# Patient Record
Sex: MALE | Race: WHITE | Employment: OTHER | URBAN - METROPOLITAN AREA
[De-identification: names, ages, dates, MRNs, and addresses within clinical notes are randomized per-mention and may not be internally consistent; named-entity substitution may affect disease eponyms.]

---

## 2017-02-01 ENCOUNTER — MED REC SCAN ONLY (OUTPATIENT)
Dept: FAMILY MEDICINE CLINIC | Facility: CLINIC | Age: 67
End: 2017-02-01

## 2017-02-07 ENCOUNTER — TELEPHONE (OUTPATIENT)
Dept: FAMILY MEDICINE CLINIC | Facility: CLINIC | Age: 67
End: 2017-02-07

## 2017-02-07 DIAGNOSIS — I25.10 CORONARY ARTERY DISEASE INVOLVING NATIVE CORONARY ARTERY OF NATIVE HEART WITHOUT ANGINA PECTORIS: Primary | ICD-10-CM

## 2017-02-07 NOTE — TELEPHONE ENCOUNTER
ekg was ordered for his preop and he's due for labs    Tell him to get them done now in time for his surgery and then we can send preop paperwork- no need for ov for him we did it already

## 2017-02-07 NOTE — TELEPHONE ENCOUNTER
Patient notified he needs to do labs and ekg in outpatient or at the hospital.  Dr. Kendra Todd does not need to see him. Patient will get testing done and then call us so we are aware, Dr. Kendra Todd will review and do clearance. I cancelled his upcoming appointment.

## 2017-02-09 ENCOUNTER — PRIOR ORIGINAL RECORDS (OUTPATIENT)
Dept: OTHER | Age: 67
End: 2017-02-09

## 2017-02-09 ENCOUNTER — APPOINTMENT (OUTPATIENT)
Dept: LAB | Age: 67
End: 2017-02-09
Attending: INTERNAL MEDICINE
Payer: MEDICARE

## 2017-02-09 ENCOUNTER — LAB ENCOUNTER (OUTPATIENT)
Dept: LAB | Age: 67
End: 2017-02-09
Attending: INTERNAL MEDICINE
Payer: MEDICARE

## 2017-02-09 DIAGNOSIS — E11.8 CONTROLLED TYPE 2 DIABETES MELLITUS WITH COMPLICATION, WITHOUT LONG-TERM CURRENT USE OF INSULIN (HCC): ICD-10-CM

## 2017-02-09 DIAGNOSIS — I25.10 CORONARY ARTERY DISEASE INVOLVING NATIVE CORONARY ARTERY OF NATIVE HEART WITHOUT ANGINA PECTORIS: ICD-10-CM

## 2017-02-09 PROBLEM — D75.1 ERYTHROCYTOSIS: Status: ACTIVE | Noted: 2017-02-09

## 2017-02-09 LAB
ALBUMIN SERPL-MCNC: 3.9 G/DL (ref 3.5–4.8)
ALP LIVER SERPL-CCNC: 57 U/L (ref 45–117)
ALT SERPL-CCNC: 65 U/L (ref 17–63)
AST SERPL-CCNC: 34 U/L (ref 15–41)
ATRIAL RATE: 72 BPM
BASOPHILS # BLD AUTO: 0.06 X10(3) UL (ref 0–0.1)
BASOPHILS NFR BLD AUTO: 0.7 %
BILIRUB SERPL-MCNC: 0.5 MG/DL (ref 0.1–2)
BUN BLD-MCNC: 15 MG/DL (ref 8–20)
CALCIUM BLD-MCNC: 9.4 MG/DL (ref 8.3–10.3)
CHLORIDE: 106 MMOL/L (ref 101–111)
CHOLEST SMN-MCNC: 130 MG/DL (ref ?–200)
CO2: 27 MMOL/L (ref 22–32)
CREAT BLD-MCNC: 1.15 MG/DL (ref 0.7–1.3)
CREAT UR-SCNC: 181 MG/DL
EOSINOPHIL # BLD AUTO: 0.17 X10(3) UL (ref 0–0.3)
EOSINOPHIL NFR BLD AUTO: 1.9 %
ERYTHROCYTE [DISTWIDTH] IN BLOOD BY AUTOMATED COUNT: 12.3 % (ref 11.5–16)
GLUCOSE BLD-MCNC: 115 MG/DL (ref 70–99)
HCT VFR BLD AUTO: 52.8 % (ref 37–53)
HDLC SERPL-MCNC: 44 MG/DL (ref 45–?)
HDLC SERPL: 2.95 {RATIO} (ref ?–4.97)
HGB BLD-MCNC: 18 G/DL (ref 13–17)
IMMATURE GRANULOCYTE COUNT: 0.03 X10(3) UL (ref 0–1)
IMMATURE GRANULOCYTE RATIO %: 0.3 %
LDLC SERPL CALC-MCNC: 60 MG/DL (ref ?–130)
LYMPHOCYTES # BLD AUTO: 2.92 X10(3) UL (ref 0.9–4)
LYMPHOCYTES NFR BLD AUTO: 32.3 %
M PROTEIN MFR SERPL ELPH: 7.4 G/DL (ref 6.1–8.3)
MCH RBC QN AUTO: 31.3 PG (ref 27–33.2)
MCHC RBC AUTO-ENTMCNC: 34.1 G/DL (ref 31–37)
MCV RBC AUTO: 91.7 FL (ref 80–99)
MICROALBUMIN UR-MCNC: 0.79 MG/DL
MICROALBUMIN/CREAT 24H UR-RTO: 4.4 UG/MG (ref ?–30)
MONOCYTES # BLD AUTO: 0.9 X10(3) UL (ref 0.1–0.6)
MONOCYTES NFR BLD AUTO: 10 %
NEUTROPHIL ABS PRELIM: 4.95 X10 (3) UL (ref 1.3–6.7)
NEUTROPHILS # BLD AUTO: 4.95 X10(3) UL (ref 1.3–6.7)
NEUTROPHILS NFR BLD AUTO: 54.8 %
NONHDLC SERPL-MCNC: 86 MG/DL (ref ?–130)
P AXIS: 51 DEGREES
P-R INTERVAL: 184 MS
PLATELET # BLD AUTO: 264 10(3)UL (ref 150–450)
POTASSIUM SERPL-SCNC: 4.5 MMOL/L (ref 3.6–5.1)
Q-T INTERVAL: 390 MS
QRS DURATION: 114 MS
QTC CALCULATION (BEZET): 427 MS
R AXIS: 66 DEGREES
RBC # BLD AUTO: 5.76 X10(6)UL (ref 3.8–5.8)
RED CELL DISTRIBUTION WIDTH-SD: 40.9 FL (ref 35.1–46.3)
SODIUM SERPL-SCNC: 140 MMOL/L (ref 136–144)
T AXIS: 18 DEGREES
TRIGLYCERIDES: 131 MG/DL (ref ?–150)
VENTRICULAR RATE: 72 BPM
VLDL: 26 MG/DL (ref 5–40)
WBC # BLD AUTO: 9 X10(3) UL (ref 4–13)

## 2017-02-09 PROCEDURE — 82570 ASSAY OF URINE CREATININE: CPT

## 2017-02-09 PROCEDURE — 82043 UR ALBUMIN QUANTITATIVE: CPT

## 2017-02-09 PROCEDURE — 93010 ELECTROCARDIOGRAM REPORT: CPT | Performed by: INTERNAL MEDICINE

## 2017-02-09 PROCEDURE — 36415 COLL VENOUS BLD VENIPUNCTURE: CPT

## 2017-02-09 PROCEDURE — 80061 LIPID PANEL: CPT

## 2017-02-09 PROCEDURE — 93005 ELECTROCARDIOGRAM TRACING: CPT

## 2017-02-09 PROCEDURE — 80053 COMPREHEN METABOLIC PANEL: CPT

## 2017-02-09 PROCEDURE — 85025 COMPLETE CBC W/AUTO DIFF WBC: CPT

## 2017-02-09 NOTE — PROGRESS NOTES
Quick Note:    Labs are mostly normal but elevated liver enzymes, and high rbc count.  This is fine for his cataract surgery but let him know to make a fu appt and place a referral to dr mcintosh hematologist for eval thanks  ______

## 2017-02-10 ENCOUNTER — TELEPHONE (OUTPATIENT)
Dept: FAMILY MEDICINE CLINIC | Facility: CLINIC | Age: 67
End: 2017-02-10

## 2017-02-13 ENCOUNTER — TELEPHONE (OUTPATIENT)
Dept: FAMILY MEDICINE CLINIC | Facility: CLINIC | Age: 67
End: 2017-02-13

## 2017-02-13 NOTE — TELEPHONE ENCOUNTER
Narrative      Normal sinus rhythm  Inferior infarct (cited on or before 29-MAY-2003)  Abnormal ECG  When compared with ECG of 24-JAN-2015 10:18,  No significant change  Confirmed by Nicko Falk M.D., [address][id number]) on 2/9/2017 5:59:47 PM     Patient would like t

## 2017-02-13 NOTE — TELEPHONE ENCOUNTER
Patient informed he does not need appointment tomorrow. Appt is already cancelled. Ekg was the same as 2015 and Dr. Micki Weston his paperwork to Dr. Yehuda North.

## 2017-02-13 NOTE — TELEPHONE ENCOUNTER
1. take him off schedule- he doesn' tneed appt    2. ekg was same as 1/2015- tell him i already sent paperwork to dr oppenheim he's fine for surgery    Please take him off my schedule

## 2017-02-14 ENCOUNTER — PATIENT MESSAGE (OUTPATIENT)
Dept: FAMILY MEDICINE CLINIC | Facility: CLINIC | Age: 67
End: 2017-02-14

## 2017-02-14 DIAGNOSIS — R71.8 ABNORMAL RBC: Primary | ICD-10-CM

## 2017-02-14 NOTE — TELEPHONE ENCOUNTER
From: Jhon Herrera  To: Julie Fothergill, MD  Sent: 2/14/2017 1:57 PM CST  Subject: Test Results Question    I have a question about MICROALB/CREAT RATIO, RANDOM URINE resulted on 2/9/17, 11:41 AM.    I also did ekg and blood work, where do I find those resul

## 2017-02-14 NOTE — TELEPHONE ENCOUNTER
Notes Recorded by Mary Berumen RN on 2/14/2017 at 1:29 PM  See results below. Spoke with patient regarding lab results. Patient aware, all questions answered.    Referral placed   Bryan Duncan MD  Hematology & [address]  Hematology On[place]

## 2017-02-22 ENCOUNTER — TELEPHONE (OUTPATIENT)
Dept: FAMILY MEDICINE CLINIC | Facility: CLINIC | Age: 67
End: 2017-02-22

## 2017-02-22 NOTE — TELEPHONE ENCOUNTER
Notified patient MD is hem/onc but he is seeing MD for hematology portion, patient verbalized understanding.

## 2017-03-08 ENCOUNTER — OFFICE VISIT (OUTPATIENT)
Dept: HEMATOLOGY/ONCOLOGY | Age: 67
End: 2017-03-08
Attending: INTERNAL MEDICINE
Payer: MEDICARE

## 2017-03-08 ENCOUNTER — PRIOR ORIGINAL RECORDS (OUTPATIENT)
Dept: OTHER | Age: 67
End: 2017-03-08

## 2017-03-08 VITALS
RESPIRATION RATE: 18 BRPM | SYSTOLIC BLOOD PRESSURE: 144 MMHG | TEMPERATURE: 98 F | HEART RATE: 73 BPM | DIASTOLIC BLOOD PRESSURE: 76 MMHG | WEIGHT: 280 LBS | BODY MASS INDEX: 37 KG/M2

## 2017-03-08 DIAGNOSIS — E11.9 TYPE 2 DIABETES MELLITUS WITHOUT COMPLICATION, WITHOUT LONG-TERM CURRENT USE OF INSULIN (HCC): ICD-10-CM

## 2017-03-08 DIAGNOSIS — R71.8 ABNORMAL RBC: Primary | ICD-10-CM

## 2017-03-08 DIAGNOSIS — D75.1 POLYCYTHEMIA: ICD-10-CM

## 2017-03-08 DIAGNOSIS — N52.9 ERECTILE DYSFUNCTION, UNSPECIFIED ERECTILE DYSFUNCTION TYPE: ICD-10-CM

## 2017-03-08 LAB
BASOPHILS # BLD AUTO: 0.05 X10(3) UL (ref 0–0.1)
BASOPHILS NFR BLD AUTO: 0.6 %
DEPRECATED HBV CORE AB SER IA-ACNC: 212.4 NG/ML (ref 22–322)
EOSINOPHIL # BLD AUTO: 0.19 X10(3) UL (ref 0–0.3)
EOSINOPHIL NFR BLD AUTO: 2.2 %
ERYTHROCYTE [DISTWIDTH] IN BLOOD BY AUTOMATED COUNT: 12.7 % (ref 11.5–16)
HCT VFR BLD AUTO: 50.9 % (ref 37–53)
HGB BLD-MCNC: 17.3 G/DL (ref 13–17)
IMMATURE GRANULOCYTE COUNT: 0.01 X10(3) UL (ref 0–1)
IMMATURE GRANULOCYTE RATIO %: 0.1 %
IRON SATURATION: 31 % (ref 13–45)
IRON: 107 UG/DL (ref 45–182)
LYMPHOCYTES # BLD AUTO: 2.89 X10(3) UL (ref 0.9–4)
LYMPHOCYTES NFR BLD AUTO: 33.8 %
MCH RBC QN AUTO: 31.2 PG (ref 27–33.2)
MCHC RBC AUTO-ENTMCNC: 34 G/DL (ref 31–37)
MCV RBC AUTO: 91.9 FL (ref 80–99)
MONOCYTES # BLD AUTO: 0.94 X10(3) UL (ref 0.1–0.6)
MONOCYTES NFR BLD AUTO: 11 %
NEUTROPHIL ABS PRELIM: 4.46 X10 (3) UL (ref 1.3–6.7)
NEUTROPHILS # BLD AUTO: 4.46 X10(3) UL (ref 1.3–6.7)
NEUTROPHILS NFR BLD AUTO: 52.3 %
PLATELET # BLD AUTO: 278 10(3)UL (ref 150–450)
RBC # BLD AUTO: 5.54 X10(6)UL (ref 3.8–5.8)
RED CELL DISTRIBUTION WIDTH-SD: 43 FL (ref 35.1–46.3)
TOTAL IRON BINDING CAPACITY: 349 UG/DL (ref 298–536)
TRANSFERRIN: 234 MG/DL (ref 200–360)
WBC # BLD AUTO: 8.5 X10(3) UL (ref 4–13)

## 2017-03-08 PROCEDURE — 99205 OFFICE O/P NEW HI 60 MIN: CPT | Performed by: INTERNAL MEDICINE

## 2017-03-08 NOTE — CONSULTS
Cancer Center Report of Consultation    Patient Name: Kath Grace   YOB: 1950   Medical Record Number: [id number]   CSN: [id number]   Consulting Physician: Olivia Calle M.D.    Referring Physician: Beverley Crawford    Date of Consultation: Surgical History    CABG  2003    COLONOSCOPY  2003    REMOVAL GALLBLADDER      CYSTOSCOPY,INSERT URETERAL STENT  12.2014    CHOLECYSTECTOMY      COLONOSCOPY N/A 1/30/2015    Comment Procedure: COLONOSCOPY;  Surgeon: Brandon Luther MD;  Location: [address] [place] [address] Suppl (ACCU-CHEK JAQUELINE CONNECT) W/DEVICE Does not apply Kit, 1 kit by Does not apply route daily. , Disp: 1 kit, Rfl: 0  •  Glucose Blood (ACCU-CHEK JAQUELINE PLUS) In Vitro Strip, Use test strip daily, Disp: 100 strip, Rfl: 0  •  ACCU-CHEK FASTCLIX LANCETS Meredith 0    Physical Examination:    Constitutional Normal - Alert, cooperative, oriented. Mood and affect appropriate. Appears close to chronological age. Well nourished. Well developed. Eyes Normal - Conjunctivae and sclerae are clear and without icterus.  Pup PHOSPHATASE Latest Ref Range:  U/L 57   AST (SGOT) Latest Ref Range: 15-41 U/L 34   ALT (SGPT) Latest Ref Range: 17-63 U/L 65 (H)   Total Bilirubin Latest Ref Range: 0.1-2.0 mg/dL 0.5   TOTAL PROTEIN Latest Ref Range: 6.1-8.3 g/dL 7.4   Albumin Lates toward the upper limit of normal.  The differential is fairly broad and includes iron overload, as well as primary and secondary polycythemia. His ED, elevated LFT, and diabetes may indicate hemochromatosis.   His snoring, nighttime apneic episodes, and day

## 2017-03-10 LAB — ERYTHROPOIETIN (EPO): 8 MU/ML

## 2017-03-22 ENCOUNTER — OFFICE VISIT (OUTPATIENT)
Dept: HEMATOLOGY/ONCOLOGY | Age: 67
End: 2017-03-22
Attending: INTERNAL MEDICINE
Payer: MEDICARE

## 2017-03-22 VITALS
HEART RATE: 74 BPM | OXYGEN SATURATION: 97 % | SYSTOLIC BLOOD PRESSURE: 159 MMHG | DIASTOLIC BLOOD PRESSURE: 91 MMHG | TEMPERATURE: 97 F | BODY MASS INDEX: 37 KG/M2 | WEIGHT: 280.19 LBS | RESPIRATION RATE: 18 BRPM

## 2017-03-22 DIAGNOSIS — D75.1 POLYCYTHEMIA: Primary | ICD-10-CM

## 2017-03-22 PROCEDURE — 99214 OFFICE O/P EST MOD 30 MIN: CPT | Performed by: INTERNAL MEDICINE

## 2017-03-28 LAB — JAK2 GENE, V617F MUTATION, QUALITATIVE: NOT DETECTED

## 2017-03-30 NOTE — PROGRESS NOTES
Cancer Center Progress Note  Patient Name: Rehan Coy   YOB: 1950   Medical Record Number: [id number]      Attending Physician: Jennifer Guzman M.D. Date of Visit: 3/22/2017     Chief Complaint:  No chief complaint on file. Cervical disc herniation 5/23/2016   • Erythrocytosis 2/9/2017       Past Surgical History:      Past Surgical History    CABG  2003    COLONOSCOPY  2003    REMOVAL GALLBLADDER      CYSTOSCOPY,INSERT URETERAL STENT  12.2014    CHOLECYSTECTOMY      COLONOSC Erectile Dysfunction. , Disp: 4 tablet, Rfl: 0  •  Blood Glucose Monitoring Suppl (ACCU-CHEK JAQUELINE CONNECT) W/DEVICE Does not apply Kit, 1 kit by Does not apply route daily. , Disp: 1 kit, Rfl: 0  •  Glucose Blood (ACCU-CHEK JAQUELINE PLUS) In Vitro Strip, Use t close to chronological age. Well nourished. Well developed. Eyes Normal - Conjunctivae and sclerae are clear and without icterus. Pupils are reactive and equal.   Hematologic/Lymphatic Normal - No petechiae or purpura.   No tender or palpable lymph nodes 1. 30-6.70 x10(3) uL 4.46   Lymphocytes Absolute Latest Ref Range: 0.90-4.00 x10(3) uL 2.89   Monocytes Absolute Latest Ref Range: 0.10-0.60 x10(3) uL 0.94 (H)   Eosinophils Absolute Latest Ref Range: 0.00-0.30 x10(3) uL 0.19   Basophils Absolute Latest Ref

## 2017-04-05 ENCOUNTER — OFFICE VISIT (OUTPATIENT)
Dept: FAMILY MEDICINE CLINIC | Facility: CLINIC | Age: 67
End: 2017-04-05

## 2017-04-05 VITALS
DIASTOLIC BLOOD PRESSURE: 78 MMHG | HEART RATE: 76 BPM | SYSTOLIC BLOOD PRESSURE: 128 MMHG | HEIGHT: 74 IN | RESPIRATION RATE: 16 BRPM | TEMPERATURE: 98 F | BODY MASS INDEX: 36.32 KG/M2 | WEIGHT: 283 LBS

## 2017-04-05 DIAGNOSIS — H90.3 SENSORINEURAL HEARING LOSS (SNHL) OF BOTH EARS: ICD-10-CM

## 2017-04-05 DIAGNOSIS — E66.01 SEVERE OBESITY (BMI 35.0-39.9) WITH COMORBIDITY (HCC): Chronic | ICD-10-CM

## 2017-04-05 DIAGNOSIS — K76.0 FATTY LIVER: ICD-10-CM

## 2017-04-05 DIAGNOSIS — E11.51 PERIPHERAL VASCULAR DISEASE IN DIABETES MELLITUS (HCC): ICD-10-CM

## 2017-04-05 DIAGNOSIS — R79.89 ABNORMAL LFTS: ICD-10-CM

## 2017-04-05 DIAGNOSIS — M47.812 CERVICAL SPINE ARTHRITIS: ICD-10-CM

## 2017-04-05 DIAGNOSIS — I71.4 ABDOMINAL AORTIC ANEURYSM (AAA) WITHOUT RUPTURE (HCC): Primary | ICD-10-CM

## 2017-04-05 DIAGNOSIS — I77.9 BILATERAL CAROTID ARTERY DISEASE (HCC): ICD-10-CM

## 2017-04-05 DIAGNOSIS — D75.1 ERYTHROCYTOSIS: ICD-10-CM

## 2017-04-05 DIAGNOSIS — I25.810 ATHEROSCLEROSIS OF CORONARY ARTERY BYPASS GRAFT OF NATIVE HEART WITHOUT ANGINA PECTORIS: ICD-10-CM

## 2017-04-05 DIAGNOSIS — R15.1 FECAL SMEARING: ICD-10-CM

## 2017-04-05 DIAGNOSIS — Z95.1 S/P CABG (CORONARY ARTERY BYPASS GRAFT): ICD-10-CM

## 2017-04-05 DIAGNOSIS — N52.9 VASCULOGENIC ERECTILE DYSFUNCTION, UNSPECIFIED VASCULOGENIC ERECTILE DYSFUNCTION TYPE: ICD-10-CM

## 2017-04-05 DIAGNOSIS — M70.61 TROCHANTERIC BURSITIS OF RIGHT HIP: ICD-10-CM

## 2017-04-05 DIAGNOSIS — Z00.00 ROUTINE GENERAL MEDICAL EXAMINATION AT A HEALTH CARE FACILITY: ICD-10-CM

## 2017-04-05 DIAGNOSIS — D75.1 POLYCYTHEMIA: ICD-10-CM

## 2017-04-05 DIAGNOSIS — M50.20 CERVICAL DISC HERNIATION: ICD-10-CM

## 2017-04-05 DIAGNOSIS — Z87.442 HISTORY OF KIDNEY STONES: ICD-10-CM

## 2017-04-05 DIAGNOSIS — N40.0 BENIGN NON-NODULAR PROSTATIC HYPERPLASIA WITHOUT LOWER URINARY TRACT SYMPTOMS: ICD-10-CM

## 2017-04-05 DIAGNOSIS — K57.30 DIVERTICULOSIS OF LARGE INTESTINE WITHOUT HEMORRHAGE: ICD-10-CM

## 2017-04-05 DIAGNOSIS — I70.203 BILATERAL ATHEROSCLEROSIS OF LEGS (HCC): ICD-10-CM

## 2017-04-05 DIAGNOSIS — I49.3 PVC (PREMATURE VENTRICULAR CONTRACTION): ICD-10-CM

## 2017-04-05 DIAGNOSIS — M62.08 DIASTASIS RECTI: ICD-10-CM

## 2017-04-05 DIAGNOSIS — M47.22 OSTEOARTHRITIS OF SPINE WITH RADICULOPATHY, CERVICAL REGION: ICD-10-CM

## 2017-04-05 DIAGNOSIS — M48.02 FORAMINAL STENOSIS OF CERVICAL REGION: ICD-10-CM

## 2017-04-05 DIAGNOSIS — E78.5 DYSLIPIDEMIA: ICD-10-CM

## 2017-04-05 PROCEDURE — 96160 PT-FOCUSED HLTH RISK ASSMT: CPT | Performed by: INTERNAL MEDICINE

## 2017-04-05 RX ORDER — SILDENAFIL 100 MG/1
100 TABLET, FILM COATED ORAL
Qty: 4 TABLET | Refills: 0 | COMMUNITY
Start: 2017-04-05 | End: 2018-02-15

## 2017-04-05 NOTE — PATIENT INSTRUCTIONS
Thank you for choosing Justin Leo MD at [place] [address]  To Do: Carly Ennis  1. Ask cardiologist about checking ultrasound on legs and carotids to see if there's more plaque  2.  Checkup dr rutherford in 6-12 months  Labs next year    • Please signup for strive to make you healthier and to improve your quality of life.     Referrals, and Radiology Information:    If your insurance requires a referral to a specialist, please allow 5 business days to process your referral request.    If Sang Vidales MD orders

## 2017-04-05 NOTE — PROGRESS NOTES
Julio Cesar Everett is a 77year old male who presents for a MA Supervisit.     Health Maintenance Topics with due status: Due Soon       Topic Date Due    Annual Depression Screen 05/23/2017     Health Maintenance Topics with due status: Overdue       Topic Date poor?: No    Type of Diet: Balanced    How does the patient maintain a good energy level?: Other (Does not exercise)    How would you describe your daily physical activity?: Light    How would you describe your current health state?: Good    How do you Heath Hem days    PHQ-2 SCORE: 2           Advance Directives     Do you have a healthcare power of ?: No    Do you have a living will?: No     Patient is already taking aspirin per med list or else it's contraindicated based on patient's intolerance to aspi PSA  Every 2 years PSA due on 05/06/2018  Update Health Maintenance if applicable   Immunizations      Influenza No orders found for this or any previous visit.  Update Immunization Activity if applicable    Pneumococcal   Orders placed or performed in daily as needed for Erectile Dysfunction. Disp: 4 tablet Rfl: 0   cholestyramine light 4 G Oral Powd Pack Take 1 packet (4 g total) by mouth 2 (two) times daily.  For stool leakage Disp: 50 each Rfl: 12   GlipiZIDE ER 5 MG Oral Tablet 24 Hr Take 1 tablet (5 bifocals   • Osteoarthrosis, unspecified whether generalized or localized, unspecified site    • Hyperlipidemia    • Essential hypertension    • Obesity (BMI 30-39. 9) 5/23/2016   • Cervical disc herniation 5/23/2016   • Erythrocytosis 2/9/2017   • Cataract adenopathy. Does not bruise/bleed easily. Psychiatric/Behavioral: Negative for confusion and agitation. The patient is not nervous/anxious. EXAM:   /78 mmHg  Pulse 76  Temp(Src) 97.9 °F (36.6 °C) (Temporal)  Resp 16  Ht 74\"  Wt 283 lb  BMI 36. 3 Patient verbalizes understanding. Patient is notified to call with any questions, complications, allergies, or worsening or changing symptoms. Patient is to call with any side effects or complications from the treatments as a result of today.     Diagnoses repeat imaging of neck and his legs    Abnormal LFTs chronic stable issue, continue present management with observation and medications as noted      Dyslipidemia chronic stable issue, continue present management with observation and medications as noted both ears chronic stable issue, continue present management with observation and medications as noted      Diverticulosis of large intestine without hemorrhage  chronic stable issue, continue present management with observation and medications as noted atherosclerosis of legs ([address]     Carotid artery disease ([address]     Diverticulosis     Fatty liver     AAA (abdominal aortic aneurysm) ([address]     History of kidney stones     Diastasis recti     Osteoarthritis of spine with radiculopathy, cervical region

## 2017-04-27 ENCOUNTER — TELEPHONE (OUTPATIENT)
Dept: FAMILY MEDICINE CLINIC | Facility: CLINIC | Age: 67
End: 2017-04-27

## 2017-05-04 ENCOUNTER — OFFICE VISIT (OUTPATIENT)
Dept: SLEEP CENTER | Facility: HOSPITAL | Age: 67
End: 2017-05-04
Attending: INTERNAL MEDICINE
Payer: MEDICARE

## 2017-05-04 PROCEDURE — 95811 POLYSOM 6/>YRS CPAP 4/> PARM: CPT

## 2017-05-10 ENCOUNTER — PRIOR ORIGINAL RECORDS (OUTPATIENT)
Dept: OTHER | Age: 67
End: 2017-05-10

## 2017-05-10 LAB
ALBUMIN: 3.9 G/DL
ALKALINE PHOSPHATATE(ALK PHOS): 57 IU/L
ALT (SGPT): 65 U/L
AST (SGOT): 34 U/L
BILIRUBIN TOTAL: 0.5 MG/DL
BUN: 15 MG/DL
CALCIUM: 9.4 MG/DL
CHLORIDE: 106 MEQ/L
CHOLESTEROL, TOTAL: 130 MG/DL
CREATININE, SERUM: 1.15 MG/DL
GLUCOSE: 115 MG/DL
GLUCOSE: 115 MG/DL
HDL CHOLESTEROL: 44 MG/DL
HEMATOCRIT: 50.9 %
HEMATOCRIT: 52.8 %
HEMOGLOBIN: 17.3 G/DL
HEMOGLOBIN: 18 G/DL
LDL CHOLESTEROL: 60 MG/DL
MCH: 31.2 PG
MCH: 31.3 PG
MCHC: 34 G/DL
MCHC: 34.1 G/DL
MCV: 91.7 FL
MCV: 91.9 FL
NON-HDL CHOLESTEROL: 86 MG/DL
PLATELETS: 264 K/UL
PLATELETS: 278 K/UL
POTASSIUM, SERUM: 4.5 MEQ/L
PROTEIN, TOTAL: 7.4 G/DL
RED BLOOD COUNT: 5.54 X 10-6/U
RED BLOOD COUNT: 5.76 X 10-6/U
SGOT (AST): 34 IU/L
SGPT (ALT): 65 IU/L
SODIUM: 140 MEQ/L
TRIGLYCERIDES: 131 MG/DL
WHITE BLOOD COUNT: 8.5 X 10-3/U
WHITE BLOOD COUNT: 9 X 10-3/U

## 2017-05-10 NOTE — PROCEDURES
[address]       Accredited by the [facility] ([facility])    PATIENT'S NAME:        Ilda Santoyo  ATTENDING PHYSICIAN:   Otto Garcia M.D. REFERRING PHYSICIAN:   Otto Garcia M.D.   PATIENT patient had an overall sleep efficiency of 73%. During baseline portion of study, sleep stage breakdown as follows:  Stage 1, 1%; stage 2, 72%; stage 3, 20%; stage REM 8%.     During CPAP titration, the total recording time was 242 minutes, total sleep zak of alveolar hypoventilation, or could be due to underlying cardiopulmonary disease. 2.   Inadequate CPAP titration with persistent obstructive hypopneic episodes as well as treatment emergent central apneas. RECOMMENDATIONS:    1.    The patient should

## 2017-05-12 ENCOUNTER — HOSPITAL ENCOUNTER (OUTPATIENT)
Dept: ULTRASOUND IMAGING | Facility: HOSPITAL | Age: 67
Discharge: HOME | End: 2017-05-12
Attending: INTERNAL MEDICINE
Payer: MEDICARE

## 2017-05-12 DIAGNOSIS — I71.4 ABDOMINAL AORTIC ANEURYSM WITHOUT RUPTURE (HCC): ICD-10-CM

## 2017-05-12 PROCEDURE — 76770 US EXAM ABDO BACK WALL COMP: CPT | Performed by: INTERNAL MEDICINE

## 2017-05-18 ENCOUNTER — OFFICE VISIT (OUTPATIENT)
Dept: HEMATOLOGY/ONCOLOGY | Age: 67
End: 2017-05-18
Attending: INTERNAL MEDICINE
Payer: MEDICARE

## 2017-05-18 VITALS
WEIGHT: 282.19 LBS | OXYGEN SATURATION: 92 % | SYSTOLIC BLOOD PRESSURE: 121 MMHG | RESPIRATION RATE: 20 BRPM | TEMPERATURE: 98 F | DIASTOLIC BLOOD PRESSURE: 55 MMHG | BODY MASS INDEX: 37 KG/M2 | HEART RATE: 71 BPM

## 2017-05-18 DIAGNOSIS — D75.1 ERYTHROCYTOSIS: Primary | ICD-10-CM

## 2017-05-18 PROCEDURE — 99213 OFFICE O/P EST LOW 20 MIN: CPT | Performed by: INTERNAL MEDICINE

## 2017-05-18 NOTE — PROGRESS NOTES
Cancer Center Progress Note  Patient Name: Greg Durán   YOB: 1950   Medical Record Number: [id number]      Attending Physician: Ayan Bowles M.D. Date of Visit: 5/18/2017    Chief Complaint:  No chief complaint on file. unspecified site    • Hyperlipidemia    • Essential hypertension    • Obesity (BMI 30-39. 9) 5/23/2016   • Cervical disc herniation 5/23/2016   • Erythrocytosis 2/9/2017   • Cataract, left      already had R eye update 2016   • Obstructive sleep apnea (adul each, Rfl: 12  •  GlipiZIDE ER 5 MG Oral Tablet 24 Hr, Take 1 tablet (5 mg total) by mouth daily.  Extended release for blood sugars, Disp: 90 tablet, Rfl: 3  •  Blood Glucose Monitoring Suppl (ACCU-CHEK JAQUELINE CONNECT) W/DEVICE Does not apply Kit, 1 kit by 92%    Physical Examination:    Constitutional Normal - Mood and affect appropriate. Appears close to chronological age. Well nourished. Well developed. Eyes Normal - Conjunctivae and sclerae are clear and without icterus.  Pupils are reactive and equal. Prelim Neutrophil Abs Latest Ref Range: 1.30-6.70 x10 (3) uL 4.46   4.51   Neutrophils Absolute Latest Ref Range: 1.30-6.70 x10(3) uL 4.46   4.51   Lymphocytes Absolute Latest Ref Range: 0.90-4.00 x10(3) uL 2.89   2.56   Monocytes Absolute Latest Ref Ran

## 2017-06-14 PROBLEM — G47.33 OSA (OBSTRUCTIVE SLEEP APNEA): Status: ACTIVE | Noted: 2017-06-14

## 2017-07-20 ENCOUNTER — TELEPHONE (OUTPATIENT)
Dept: HEMATOLOGY/ONCOLOGY | Facility: HOSPITAL | Age: 67
End: 2017-07-20

## 2017-07-20 ENCOUNTER — TELEPHONE (OUTPATIENT)
Dept: FAMILY MEDICINE CLINIC | Facility: CLINIC | Age: 67
End: 2017-07-20

## 2017-07-20 NOTE — TELEPHONE ENCOUNTER
Concerned as he noticed blood in his semen. Instructed to follow up with his PCP. Verbalized understanding and states he will call now.

## 2017-07-20 NOTE — TELEPHONE ENCOUNTER
Delano Bustillo- Patient stated that he  has blood in his semen for two weeks. He would like to speak with a nurse to discuss the symptoms that he is having. Please advise.

## 2017-07-20 NOTE — TELEPHONE ENCOUNTER
Patient reports blood in semen for 2 weeks with no improvement. Patient states it is not present in urine only in semen. Patient does not have urologist. Scheduled for the following appt:      Future Appointments  Date Time Provider Liane Lopez   7/24

## 2017-07-24 ENCOUNTER — OFFICE VISIT (OUTPATIENT)
Dept: FAMILY MEDICINE CLINIC | Facility: CLINIC | Age: 67
End: 2017-07-24

## 2017-07-24 VITALS
RESPIRATION RATE: 16 BRPM | TEMPERATURE: 97 F | HEART RATE: 72 BPM | BODY MASS INDEX: 36.83 KG/M2 | DIASTOLIC BLOOD PRESSURE: 70 MMHG | SYSTOLIC BLOOD PRESSURE: 122 MMHG | WEIGHT: 287 LBS | HEIGHT: 74 IN

## 2017-07-24 DIAGNOSIS — D75.1 POLYCYTHEMIA: ICD-10-CM

## 2017-07-24 DIAGNOSIS — R36.1 BLOOD IN SEMEN: Primary | ICD-10-CM

## 2017-07-24 DIAGNOSIS — G47.33 OSA (OBSTRUCTIVE SLEEP APNEA): ICD-10-CM

## 2017-07-24 DIAGNOSIS — E66.01 SEVERE OBESITY (BMI 35.0-39.9): ICD-10-CM

## 2017-07-24 PROCEDURE — 99213 OFFICE O/P EST LOW 20 MIN: CPT | Performed by: INTERNAL MEDICINE

## 2017-07-24 RX ORDER — CIPROFLOXACIN 250 MG/1
250 TABLET, FILM COATED ORAL 2 TIMES DAILY
Qty: 20 TABLET | Refills: 0 | Status: SHIPPED | OUTPATIENT
Start: 2017-07-24 | End: 2017-08-02 | Stop reason: ALTCHOICE

## 2017-07-24 RX ORDER — GLIPIZIDE 5 MG/1
5 TABLET, FILM COATED, EXTENDED RELEASE ORAL DAILY
Qty: 90 TABLET | Refills: 3 | Status: SHIPPED | OUTPATIENT
Start: 2017-07-24 | End: 2018-02-15

## 2017-07-24 RX ORDER — SILDENAFIL 100 MG/1
100 TABLET, FILM COATED ORAL AS NEEDED
Qty: 4 TABLET | Refills: 0 | COMMUNITY
Start: 2017-07-24 | End: 2017-08-02

## 2017-07-24 NOTE — PATIENT INSTRUCTIONS
Thank you for choosing Sharona Young MD at [facility]  To Do: Lauryn Ennis  1. Start cipro for blood in semen  2. See urologist   3. Weight loss  Consider topiramate and wellbutrin  4.  Return for weight loss followup if you want to explore more m No cakes, cookies, pasta, bread, rice, bagels, crackers, chips. Fill your plate with fruits, vegetables, nuts, whole grains, and meat of every kind except processed meat like salami, peralta, bologna. Low calorie is less than 1297-7896 calories a day.   Kalyn Segal good carbs  BAD carbs have added sugar such as  - soft drinks   - sport drinks   - fruit drinks   - beer   - french fries   - white rice   - sugar-sweetened cereals   - bagels   - baguettes   - croissants   - potato chips   - cookies   - white crackers   - Physical Therapy call [phone] usually in [facility] in Clinic in [place] at [facility].  [address] Mon-Fri at 8am-7:30pm, and Sat/Sun 9am-430pm  Also at [address]  Call [phone] for info    • Please call our office about any ques

## 2017-07-24 NOTE — PROGRESS NOTES
[facility] Internal Medicine Office Note  Chief Complaint:   Patient presents with:  Bleeding: noticed blood in semen for past few wks.   obesity    HPI:   This is a 79year old male coming in for  HPI  Blood in semen for 2 weeks  No pain  No trau hours as needed for Pain. Disp:  Rfl:    lisinopril (PRINIVIL,ZESTRIL) 5 MG Oral Tab Take 5 mg by mouth daily. Disp:  Rfl:    Aspirin 325 MG Oral Tab Take 325 mg by mouth daily.  Disp:  Rfl:    Metoprolol Succinate (TOPROL XL) 25 MG Oral Tablet SR 24 Hr Aiden such weight loss medications. That these are optional treatments to be used in conjunction with diet and exercise for promoting health and weight loss.   We discussed the option nature of these treatments, including all the risks noted on the drug label, a were placed in this encounter. Meds & Refills for this Visit:  Signed Prescriptions Disp Refills    Ciprofloxacin HCl 250 MG Oral Tab 20 tablet 0      Sig: Take 1 tablet (250 mg total) by mouth 2 (two) times daily.       GlipiZIDE ER 5 MG Oral Tablet 2 reference to record EAP   Depression Screening (PHQ-2/PHQ-9): Over the LAST 2 WEEKS   Little interest or pleasure in doing things (over the last two weeks)?: Not at all    Feeling down, depressed, or hopeless (over the last two weeks)?: Not at all    PHQ-2

## 2017-07-25 NOTE — ADDENDUM NOTE
Encounter addended by: Bre Childers MA on: 7/24/2017  7:09 PM<BR>    Actions taken:  activity accessed

## 2017-08-14 ENCOUNTER — NURSE ONLY (OUTPATIENT)
Dept: HEMATOLOGY/ONCOLOGY | Age: 67
End: 2017-08-14
Attending: INTERNAL MEDICINE
Payer: MEDICARE

## 2017-08-14 DIAGNOSIS — D75.1 POLYCYTHEMIA: ICD-10-CM

## 2017-08-14 DIAGNOSIS — D75.1 POLYCYTHEMIA: Primary | ICD-10-CM

## 2017-08-14 LAB
BASOPHILS # BLD AUTO: 0.04 X10(3) UL (ref 0–0.1)
BASOPHILS NFR BLD AUTO: 0.5 %
EOSINOPHIL # BLD AUTO: 0.21 X10(3) UL (ref 0–0.3)
EOSINOPHIL NFR BLD AUTO: 2.6 %
ERYTHROCYTE [DISTWIDTH] IN BLOOD BY AUTOMATED COUNT: 12.6 % (ref 11.5–16)
HCT VFR BLD AUTO: 47.2 % (ref 37–53)
HGB BLD-MCNC: 16.2 G/DL (ref 13–17)
IMMATURE GRANULOCYTE COUNT: 0.02 X10(3) UL (ref 0–1)
IMMATURE GRANULOCYTE RATIO %: 0.3 %
LYMPHOCYTES # BLD AUTO: 2.6 X10(3) UL (ref 0.9–4)
LYMPHOCYTES NFR BLD AUTO: 32.7 %
MCH RBC QN AUTO: 31 PG (ref 27–33.2)
MCHC RBC AUTO-ENTMCNC: 34.3 G/DL (ref 31–37)
MCV RBC AUTO: 90.4 FL (ref 80–99)
MONOCYTES # BLD AUTO: 0.96 X10(3) UL (ref 0.1–0.6)
MONOCYTES NFR BLD AUTO: 12.1 %
NEUTROPHIL ABS PRELIM: 4.13 X10 (3) UL (ref 1.3–6.7)
NEUTROPHILS # BLD AUTO: 4.13 X10(3) UL (ref 1.3–6.7)
NEUTROPHILS NFR BLD AUTO: 51.8 %
PLATELET # BLD AUTO: 231 10(3)UL (ref 150–450)
RBC # BLD AUTO: 5.22 X10(6)UL (ref 3.8–5.8)
RED CELL DISTRIBUTION WIDTH-SD: 41.9 FL (ref 35.1–46.3)
WBC # BLD AUTO: 8 X10(3) UL (ref 4–13)

## 2017-08-14 PROCEDURE — 85025 COMPLETE CBC W/AUTO DIFF WBC: CPT

## 2017-08-14 PROCEDURE — 36415 COLL VENOUS BLD VENIPUNCTURE: CPT

## 2017-08-17 ENCOUNTER — OFFICE VISIT (OUTPATIENT)
Dept: HEMATOLOGY/ONCOLOGY | Age: 67
End: 2017-08-17
Attending: INTERNAL MEDICINE
Payer: MEDICARE

## 2017-08-17 VITALS
WEIGHT: 288.88 LBS | BODY MASS INDEX: 37 KG/M2 | TEMPERATURE: 97 F | OXYGEN SATURATION: 92 % | DIASTOLIC BLOOD PRESSURE: 73 MMHG | HEART RATE: 94 BPM | SYSTOLIC BLOOD PRESSURE: 122 MMHG | RESPIRATION RATE: 20 BRPM

## 2017-08-17 DIAGNOSIS — D75.1 SECONDARY POLYCYTHEMIA: Primary | ICD-10-CM

## 2017-08-17 PROCEDURE — 99213 OFFICE O/P EST LOW 20 MIN: CPT | Performed by: INTERNAL MEDICINE

## 2017-08-17 NOTE — PROGRESS NOTES
Pt here for MD f/u visit. Pt voices no complaints @ present time. States he had labs on mon, 8/14 and here to discuss results.      Education Record    Learner:  Patient    Disease / Clearnce Iha    Barriers / Limitations:  None   Comments:    Method:  B

## 2017-08-22 ENCOUNTER — HOSPITAL ENCOUNTER (OUTPATIENT)
Dept: CT IMAGING | Facility: HOSPITAL | Age: 67
Discharge: HOME | End: 2017-08-22
Attending: NURSE PRACTITIONER
Payer: MEDICARE

## 2017-08-22 ENCOUNTER — HOSPITAL ENCOUNTER (OUTPATIENT)
Dept: GENERAL RADIOLOGY | Facility: HOSPITAL | Age: 67
Discharge: HOME | End: 2017-08-22
Attending: UROLOGY
Payer: MEDICARE

## 2017-08-22 DIAGNOSIS — Z87.891 PERSONAL HISTORY OF NICOTINE DEPENDENCE: ICD-10-CM

## 2017-08-22 DIAGNOSIS — N20.0 RENAL CALCULI: ICD-10-CM

## 2017-08-22 PROCEDURE — 71250 CT THORAX DX C-: CPT | Performed by: NURSE PRACTITIONER

## 2017-08-22 PROCEDURE — 74000 XR ABDOMEN (1 VIEW) (CPT=74000): CPT | Performed by: UROLOGY

## 2017-08-25 NOTE — PROGRESS NOTES
Reviewed LDCT. Minimal bronchial wall thickening and scattered atelectasis. Spoke with patient. He denies symptoms of bronchitis although has occasional cough. Normal PFT 2016. Recommend LDCT in 1 year given smoking history.   Advised patient if Dr. Nevaeh Martin

## 2017-10-10 ENCOUNTER — TELEPHONE (OUTPATIENT)
Dept: FAMILY MEDICINE CLINIC | Facility: CLINIC | Age: 67
End: 2017-10-10

## 2017-10-11 ENCOUNTER — TELEPHONE (OUTPATIENT)
Dept: SURGERY | Facility: CLINIC | Age: 67
End: 2017-10-11

## 2017-10-11 ENCOUNTER — OFFICE VISIT (OUTPATIENT)
Dept: FAMILY MEDICINE CLINIC | Facility: CLINIC | Age: 67
End: 2017-10-11

## 2017-10-11 VITALS
DIASTOLIC BLOOD PRESSURE: 80 MMHG | WEIGHT: 287 LBS | RESPIRATION RATE: 16 BRPM | HEART RATE: 80 BPM | BODY MASS INDEX: 36.83 KG/M2 | HEIGHT: 74 IN | TEMPERATURE: 98 F | SYSTOLIC BLOOD PRESSURE: 126 MMHG

## 2017-10-11 DIAGNOSIS — Z95.1 S/P CABG (CORONARY ARTERY BYPASS GRAFT): ICD-10-CM

## 2017-10-11 DIAGNOSIS — E11.51 PERIPHERAL VASCULAR DISEASE IN DIABETES MELLITUS (HCC): ICD-10-CM

## 2017-10-11 DIAGNOSIS — M54.31 RIGHT SIDED SCIATICA: ICD-10-CM

## 2017-10-11 DIAGNOSIS — M47.816 ARTHRITIS OF LUMBAR SPINE: ICD-10-CM

## 2017-10-11 DIAGNOSIS — I25.810 ATHEROSCLEROSIS OF CORONARY ARTERY BYPASS GRAFT OF NATIVE HEART WITHOUT ANGINA PECTORIS: ICD-10-CM

## 2017-10-11 DIAGNOSIS — E78.5 DYSLIPIDEMIA: ICD-10-CM

## 2017-10-11 DIAGNOSIS — E66.01 SEVERE OBESITY (BMI 35.0-39.9) WITH COMORBIDITY (HCC): Chronic | ICD-10-CM

## 2017-10-11 DIAGNOSIS — I77.9 BILATERAL CAROTID ARTERY DISEASE (HCC): ICD-10-CM

## 2017-10-11 DIAGNOSIS — M54.10 RADICULAR LOW BACK PAIN: Primary | ICD-10-CM

## 2017-10-11 DIAGNOSIS — E11.51 DM (DIABETES MELLITUS) TYPE II CONTROLLED PERIPHERAL VASCULAR DISORDER: ICD-10-CM

## 2017-10-11 PROCEDURE — 90670 PCV13 VACCINE IM: CPT | Performed by: INTERNAL MEDICINE

## 2017-10-11 PROCEDURE — G0009 ADMIN PNEUMOCOCCAL VACCINE: HCPCS | Performed by: INTERNAL MEDICINE

## 2017-10-11 PROCEDURE — 99214 OFFICE O/P EST MOD 30 MIN: CPT | Performed by: INTERNAL MEDICINE

## 2017-10-11 PROCEDURE — G0008 ADMIN INFLUENZA VIRUS VAC: HCPCS | Performed by: INTERNAL MEDICINE

## 2017-10-11 PROCEDURE — 90686 IIV4 VACC NO PRSV 0.5 ML IM: CPT | Performed by: INTERNAL MEDICINE

## 2017-10-11 RX ORDER — HYDROCODONE BITARTRATE AND ACETAMINOPHEN 5; 325 MG/1; MG/1
1 TABLET ORAL EVERY 8 HOURS PRN
Qty: 90 TABLET | Refills: 0 | Status: SHIPPED | COMMUNITY
Start: 2017-10-11 | End: 2018-02-05

## 2017-10-11 RX ORDER — CYCLOBENZAPRINE HCL 10 MG
TABLET ORAL
Qty: 30 TABLET | Refills: 1 | Status: SHIPPED | OUTPATIENT
Start: 2017-10-11 | End: 2018-02-05

## 2017-10-11 RX ORDER — SILDENAFIL 100 MG/1
100 TABLET, FILM COATED ORAL AS NEEDED
Qty: 2 TABLET | Refills: 0 | COMMUNITY
Start: 2017-10-11 | End: 2018-12-18

## 2017-10-11 NOTE — PATIENT INSTRUCTIONS
Thank you for choosing Chinmay Pal MD at [place] [address]  To Do: Pauly Jean  1. Start norco for pain up to 3 a day and use flexeril at night for pain  2. See dr Yue Allen in 4 weeks if the back pain worsens  3. Try the exercises  4.  Flu shot and pneumo risks of treatment even beyond those discussed today.  All therapies have potential risk of harm or side effects or medication interactions.  It is your duty and for your safety to discuss with the pharmacist and our office with questions, and to notify us

## 2017-10-11 NOTE — PROGRESS NOTES
[facility] Internal Medicine Office Note  Chief Complaint:   Patient presents with:  Sciatica: x 2 weeks- pain going down right leg   Imm/Inj: flu/prevnar  Diabetes  CAD  pvd  Carotid artery disease    HPI:   This is a 79year old male coming in 1   GlipiZIDE ER 5 MG Oral Tablet 24 Hr Take 1 tablet (5 mg total) by mouth daily.  Extended release for blood sugars Disp: 90 tablet Rfl: 3   Sildenafil Citrate (VIAGRA) 100 MG Oral Tab Take 1 tablet (100 mg total) by mouth daily as needed for Erectile Dys grey hair  Physical Exam    Constitutional: He is obese. He appears well-developed. HENT:   Head: Normocephalic. Cardiovascular: Normal rate. No murmur heard. Pulmonary/Chest: Effort normal. No respiratory distress. He has no wheezes.    Abdominal: bypass graft) chronic stable issue, continue present management with observation and medications as noted      Bilateral carotid artery disease ([facility] [address] chronic stable issue, continue present management with observation and medications as noted      Periphera FREE, 0.5 ML  PNEUMOCOCCAL VACC, 13 VICK IM  OP REFERRAL PAIN MANGEMENT    Adult Pneumonia Vaccine(1 of 2 - PCV13) due on 07/06/2015  Diabetes Care A1C due on 05/06/2017  Influenza Vaccine(1) due on 09/01/2017  Patient/Caregiver Education: There are no barr

## 2017-10-11 NOTE — TELEPHONE ENCOUNTER
Pt declined to schedule NP appt at this time-want to try medications first and will call to schedule if appt is needed

## 2017-12-01 ENCOUNTER — MED REC SCAN ONLY (OUTPATIENT)
Dept: FAMILY MEDICINE CLINIC | Facility: CLINIC | Age: 67
End: 2017-12-01

## 2018-02-07 ENCOUNTER — TELEPHONE (OUTPATIENT)
Dept: FAMILY MEDICINE CLINIC | Facility: CLINIC | Age: 68
End: 2018-02-07

## 2018-02-07 DIAGNOSIS — H26.9 CATARACT OF BOTH EYES, UNSPECIFIED CATARACT TYPE: Primary | ICD-10-CM

## 2018-02-07 NOTE — TELEPHONE ENCOUNTER
Mateo Lomeli CNA  [facility]   Cc: [facility] Referral Pool   Phone Number: [phone]             .Reason for the order/referral:Referral   PCP: Dr. Marbella Majano   Refer to Provider Dr. Janna Raza   Specialty:Ophthalmology   You

## 2018-02-08 NOTE — TELEPHONE ENCOUNTER
Patient has an upcoming appt with Dr. Marla Menjivar.   Future Appointments  Date Time Provider Liane Lopez   2/15/2018 8:30 AM Stephen Sawyer MD [facility] [place]   2/6/2019 9:20 AM Pennie Underwood APN [facility] Pat Master

## 2018-02-15 ENCOUNTER — OFFICE VISIT (OUTPATIENT)
Dept: INTERNAL MEDICINE CLINIC | Facility: CLINIC | Age: 68
End: 2018-02-15

## 2018-02-15 VITALS
HEART RATE: 76 BPM | DIASTOLIC BLOOD PRESSURE: 70 MMHG | SYSTOLIC BLOOD PRESSURE: 120 MMHG | WEIGHT: 269.5 LBS | BODY MASS INDEX: 34.59 KG/M2 | RESPIRATION RATE: 16 BRPM | TEMPERATURE: 98 F | HEIGHT: 74 IN

## 2018-02-15 DIAGNOSIS — E11.51 DM (DIABETES MELLITUS) TYPE II, CONTROLLED, WITH PERIPHERAL VASCULAR DISORDER (HCC): Primary | ICD-10-CM

## 2018-02-15 DIAGNOSIS — Z12.5 SCREENING FOR PROSTATE CANCER: ICD-10-CM

## 2018-02-15 DIAGNOSIS — I25.10 ATHEROSCLEROSIS OF NATIVE CORONARY ARTERY OF NATIVE HEART WITHOUT ANGINA PECTORIS: ICD-10-CM

## 2018-02-15 DIAGNOSIS — I10 ESSENTIAL HYPERTENSION: ICD-10-CM

## 2018-02-15 LAB
CARTRIDGE LOT#: 799 NUMERIC
HEMOGLOBIN A1C: 5.9 % (ref 4.3–5.6)

## 2018-02-15 PROCEDURE — 99214 OFFICE O/P EST MOD 30 MIN: CPT | Performed by: INTERNAL MEDICINE

## 2018-02-15 PROCEDURE — 83036 HEMOGLOBIN GLYCOSYLATED A1C: CPT | Performed by: INTERNAL MEDICINE

## 2018-02-15 RX ORDER — GLIPIZIDE 5 MG/1
5 TABLET, FILM COATED, EXTENDED RELEASE ORAL DAILY
Qty: 90 TABLET | Refills: 3 | Status: SHIPPED | OUTPATIENT
Start: 2018-02-15 | End: 2018-08-13

## 2018-02-15 NOTE — PROGRESS NOTES
[facility] Internal Medicine Office Note  Chief Complaint:   Patient presents with:  Establish Care: New Pt. Establish care.  Pt needs referrals      HPI:   This is a 79year old male coming in for establishing care  HPI   DM - has not had recent Removal gallbladder       Family History   Problem Relation Age of Onset   • CAD[other] [OTHER] Father    • lung cancer[other] [OTHER] Father    • Arthritis Father    • Cancer Father    • Heart Disorder Father    • diabetes mellitus[other] Raissa Sites Mother mouth nightly. Disp:  Rfl:          REVIEW OF SYSTEMS:   Review of Systems   Constitutional: Negative for fever. HENT: Negative for rhinorrhea. Eyes: Negative for visual disturbance. Respiratory: Negative for shortness of breath.     Cardiovascular: peripheral vascular disorder (HCC) - cont glipizide. Check a1c today = 5.9  -     GlipiZIDE ER 5 MG Oral Tablet 24 Hr; Take 1 tablet (5 mg total) by mouth daily. Extended release for blood sugars  -     MICROALB/CREAT RATIO, RANDOM URINE;  Future    Atheros

## 2018-04-03 ENCOUNTER — TELEPHONE (OUTPATIENT)
Dept: INTERNAL MEDICINE CLINIC | Facility: CLINIC | Age: 68
End: 2018-04-03

## 2018-04-03 NOTE — TELEPHONE ENCOUNTER
Received diabetic eye exam report from Dr. Quirino Breaux dated 4/2/18. No diabetic retinopathy noted. Updated in flowsheet.

## 2018-05-12 ENCOUNTER — LAB ENCOUNTER (OUTPATIENT)
Dept: LAB | Facility: HOSPITAL | Age: 68
End: 2018-05-12
Attending: INTERNAL MEDICINE
Payer: MEDICARE

## 2018-05-12 ENCOUNTER — PRIOR ORIGINAL RECORDS (OUTPATIENT)
Dept: OTHER | Age: 68
End: 2018-05-12

## 2018-05-12 DIAGNOSIS — I25.10 CORONARY ATHEROSCLEROSIS OF NATIVE CORONARY ARTERY: ICD-10-CM

## 2018-05-12 DIAGNOSIS — E78.2 MIXED HYPERLIPIDEMIA: Primary | ICD-10-CM

## 2018-05-12 DIAGNOSIS — E11.51 DM (DIABETES MELLITUS) TYPE II, CONTROLLED, WITH PERIPHERAL VASCULAR DISORDER (HCC): ICD-10-CM

## 2018-05-12 DIAGNOSIS — Z12.5 SCREENING FOR PROSTATE CANCER: ICD-10-CM

## 2018-05-12 PROCEDURE — 36415 COLL VENOUS BLD VENIPUNCTURE: CPT

## 2018-05-12 PROCEDURE — 82043 UR ALBUMIN QUANTITATIVE: CPT

## 2018-05-12 PROCEDURE — 80053 COMPREHEN METABOLIC PANEL: CPT

## 2018-05-12 PROCEDURE — 82570 ASSAY OF URINE CREATININE: CPT

## 2018-05-12 PROCEDURE — 80061 LIPID PANEL: CPT

## 2018-05-14 LAB
ALBUMIN: 3.6 G/DL
ALKALINE PHOSPHATATE(ALK PHOS): 56 IU/L
BILIRUBIN TOTAL: 0.6 MG/DL
BUN: 16 MG/DL
CALCIUM: 8.7 MG/DL
CHLORIDE: 108 MEQ/L
CHOLESTEROL, TOTAL: 154 MG/DL
CREATININE, SERUM: 0.99 MG/DL
GLUCOSE: 105 MG/DL
HDL CHOLESTEROL: 41 MG/DL
LDL CHOLESTEROL: 87 MG/DL
POTASSIUM, SERUM: 4.2 MEQ/L
PROTEIN, TOTAL: 7 G/DL
SGOT (AST): 25 IU/L
SGPT (ALT): 45 IU/L
SODIUM: 140 MEQ/L
TRIGLYCERIDES: 132 MG/DL

## 2018-05-16 ENCOUNTER — PRIOR ORIGINAL RECORDS (OUTPATIENT)
Dept: OTHER | Age: 68
End: 2018-05-16

## 2018-06-01 ENCOUNTER — MED REC SCAN ONLY (OUTPATIENT)
Dept: INTERNAL MEDICINE CLINIC | Facility: CLINIC | Age: 68
End: 2018-06-01

## 2018-07-24 NOTE — PROGRESS NOTES
Cancer Center Progress Note  Patient Name: Bobby Beltran   YOB: 1950   Medical Record Number: [id number]      Attending Physician: Jackie López M.D. Date of Visit: 8/17/2017    Chief Complaint:  No chief complaint on file. Over the last 2 weeks, how often have you been bothered by the following problems?        PHQ2 Score:  0  1. Little interest or pleasure in doing things?:  Not at all  2. Feeling down, depressed, irritable or hopeless?:  Not at all     PHQ9 Score:  0  3.Trouble falling, staying asleep or sleeping too much?:  Not at all  4. Feeling tired or having little energy?:  Not at all  5. Poor appetite, weight loss or overeating?:  Not at all  6. Feeling bad about yourself--or feeling that you are a failure or that you have let yourself or your family down?:  Not at all  7. Trouble concentrating on things like as school work, reading or watching TV?:  Not at all  8. Moving or speaking so slowly that other people could have noticed? Or the opposite - being so fidgety or restless that you were moving around a lot more than usual?:  Not at all  9. Thoughts that you would be better off dead, or of hurting yourself in some way?:  Not at all          circulatory system     CAD   • PVD (peripheral vascular disease) ([facility] [address] 7/14/2014    On screening 2014 legs erum   • Type II or unspecified type diabetes mellitus without mention of complication, not stated as uncontrolled    • Visual impairment     wears bi cholestyramine light 4 G Oral Powd Pack, Take 1 packet (4 g total) by mouth 2 (two) times daily.  For stool leakage, Disp: 50 each, Rfl: 12  •  Blood Glucose Monitoring Suppl (ACCU-CHEK JAQUELINE CONNECT) W/DEVICE Does not apply Kit, 1 kit by Does not apply rou (Tympanic)   Resp 20   Wt 131 kg (288 lb 14.4 oz)   SpO2 92%   BMI 37.09 kg/m²     Physical Examination:    Constitutional Normal - Mood and affect appropriate. Appears close to chronological age. Well nourished. Well developed.    Eyes Normal - Conjunctiva

## 2018-08-13 ENCOUNTER — HOSPITAL ENCOUNTER (EMERGENCY)
Facility: HOSPITAL | Age: 68
Discharge: HOME | End: 2018-08-13
Payer: MEDICARE

## 2018-08-13 VITALS
OXYGEN SATURATION: 94 % | WEIGHT: 280 LBS | RESPIRATION RATE: 18 BRPM | TEMPERATURE: 99 F | DIASTOLIC BLOOD PRESSURE: 99 MMHG | SYSTOLIC BLOOD PRESSURE: 111 MMHG | BODY MASS INDEX: 37.11 KG/M2 | HEART RATE: 68 BPM | HEIGHT: 73 IN

## 2018-08-13 DIAGNOSIS — S90.30XA CONTUSION OF FOOT, UNSPECIFIED LATERALITY, INITIAL ENCOUNTER: ICD-10-CM

## 2018-08-13 DIAGNOSIS — S91.309A OPEN WOUND OF FOOT, UNSPECIFIED LATERALITY, INITIAL ENCOUNTER: Primary | ICD-10-CM

## 2018-08-13 PROCEDURE — 99283 EMERGENCY DEPT VISIT LOW MDM: CPT

## 2018-08-13 RX ORDER — LEVOFLOXACIN 500 MG/1
500 TABLET, FILM COATED ORAL NIGHTLY
Qty: 10 TABLET | Refills: 0 | Status: SHIPPED | OUTPATIENT
Start: 2018-08-13 | End: 2018-08-19

## 2018-08-13 RX ORDER — LEVOFLOXACIN 750 MG/1
750 TABLET ORAL ONCE
Status: COMPLETED | OUTPATIENT
Start: 2018-08-13 | End: 2018-08-13

## 2018-08-13 NOTE — ED INITIAL ASSESSMENT (HPI)
Patient complains of left outer foot pain since after bumping the foot against furniture yesterday (Sunday). Patient reports he accidentally stepped on a  3 days ago with his left foot. He states he had a wound there that was healing well.  He d

## 2018-08-13 NOTE — ED PROVIDER NOTES
Patient Seen in: [facility] Emergency Department    History   Patient presents with:  Cellulitis (integumentary, infectious)    Stated Complaint: cellulitis    HPI    Patient stepped on a coat , few days ago, at that time cleaned it did not th STENT  2003: OTHER SURGICAL HISTORY      Comment: gallbladder surgury  2003: OTHER SURGICAL HISTORY      Comment: quad bypass  2014: OTHER SURGICAL HISTORY      Comment: passed kidney stone via stent  No date: REMOVAL GALLBLADDER        Smoking status: For and check his sugars frequently  He is to follow-up in 48 hours for a wound check with us or primary care physician      MDM       Patient was screened and evaluated during this visit.    As a treating physician attending to the patient, I determined, withi

## 2018-12-11 RX ORDER — GLIPIZIDE 5 MG/1
TABLET, FILM COATED, EXTENDED RELEASE ORAL
Qty: 90 TABLET | Refills: 3 | OUTPATIENT
Start: 2018-12-11

## 2018-12-18 ENCOUNTER — LAB ENCOUNTER (OUTPATIENT)
Dept: LAB | Age: 68
End: 2018-12-18
Attending: INTERNAL MEDICINE
Payer: MEDICARE

## 2018-12-18 ENCOUNTER — PRIOR ORIGINAL RECORDS (OUTPATIENT)
Dept: OTHER | Age: 68
End: 2018-12-18

## 2018-12-18 ENCOUNTER — OFFICE VISIT (OUTPATIENT)
Dept: INTERNAL MEDICINE CLINIC | Facility: CLINIC | Age: 68
End: 2018-12-18

## 2018-12-18 VITALS
BODY MASS INDEX: 37.61 KG/M2 | TEMPERATURE: 98 F | HEART RATE: 68 BPM | WEIGHT: 283.75 LBS | OXYGEN SATURATION: 95 % | HEIGHT: 73 IN | RESPIRATION RATE: 16 BRPM | DIASTOLIC BLOOD PRESSURE: 68 MMHG | SYSTOLIC BLOOD PRESSURE: 116 MMHG

## 2018-12-18 DIAGNOSIS — I77.9 BILATERAL CAROTID ARTERY DISEASE, UNSPECIFIED TYPE (HCC): ICD-10-CM

## 2018-12-18 DIAGNOSIS — Z00.00 WELLNESS EXAMINATION: Primary | ICD-10-CM

## 2018-12-18 DIAGNOSIS — N40.1 BPH ASSOCIATED WITH NOCTURIA: ICD-10-CM

## 2018-12-18 DIAGNOSIS — G47.33 OSA (OBSTRUCTIVE SLEEP APNEA): ICD-10-CM

## 2018-12-18 DIAGNOSIS — E66.01 SEVERE OBESITY (BMI 35.0-39.9) WITH COMORBIDITY (HCC): ICD-10-CM

## 2018-12-18 DIAGNOSIS — E11.51 DM (DIABETES MELLITUS) TYPE II, CONTROLLED, WITH PERIPHERAL VASCULAR DISORDER (HCC): ICD-10-CM

## 2018-12-18 DIAGNOSIS — R07.89 CHEST TIGHTNESS: ICD-10-CM

## 2018-12-18 DIAGNOSIS — R35.1 BPH ASSOCIATED WITH NOCTURIA: ICD-10-CM

## 2018-12-18 DIAGNOSIS — D75.1 POLYCYTHEMIA: ICD-10-CM

## 2018-12-18 DIAGNOSIS — I25.10 CORONARY ATHEROSCLEROSIS OF NATIVE CORONARY ARTERY: ICD-10-CM

## 2018-12-18 DIAGNOSIS — M46.96 INFLAMMATORY SPONDYLOPATHY OF LUMBAR REGION (HCC): ICD-10-CM

## 2018-12-18 DIAGNOSIS — E78.2 MIXED HYPERLIPIDEMIA: Primary | ICD-10-CM

## 2018-12-18 PROCEDURE — 82570 ASSAY OF URINE CREATININE: CPT

## 2018-12-18 PROCEDURE — 82043 UR ALBUMIN QUANTITATIVE: CPT

## 2018-12-18 PROCEDURE — 83036 HEMOGLOBIN GLYCOSYLATED A1C: CPT

## 2018-12-18 PROCEDURE — 80061 LIPID PANEL: CPT

## 2018-12-18 PROCEDURE — 90653 IIV ADJUVANT VACCINE IM: CPT | Performed by: INTERNAL MEDICINE

## 2018-12-18 PROCEDURE — G0008 ADMIN INFLUENZA VIRUS VAC: HCPCS | Performed by: INTERNAL MEDICINE

## 2018-12-18 PROCEDURE — G0438 PPPS, INITIAL VISIT: HCPCS | Performed by: INTERNAL MEDICINE

## 2018-12-18 PROCEDURE — 80053 COMPREHEN METABOLIC PANEL: CPT

## 2018-12-18 PROCEDURE — 36415 COLL VENOUS BLD VENIPUNCTURE: CPT

## 2018-12-18 PROCEDURE — 96160 PT-FOCUSED HLTH RISK ASSMT: CPT | Performed by: INTERNAL MEDICINE

## 2018-12-18 RX ORDER — SILDENAFIL 100 MG/1
100 TABLET, FILM COATED ORAL AS NEEDED
Qty: 12 TABLET | Refills: 3 | Status: SHIPPED | OUTPATIENT
Start: 2018-12-18 | End: 2020-01-06

## 2018-12-18 RX ORDER — TAMSULOSIN HYDROCHLORIDE 0.4 MG/1
0.4 CAPSULE ORAL DAILY
Qty: 30 CAPSULE | Refills: 3 | Status: SHIPPED | OUTPATIENT
Start: 2018-12-18 | End: 2018-12-18

## 2018-12-18 RX ORDER — TAMSULOSIN HYDROCHLORIDE 0.4 MG/1
0.4 CAPSULE ORAL DAILY
Qty: 90 CAPSULE | Refills: 1 | Status: SHIPPED | OUTPATIENT
Start: 2018-12-18 | End: 2019-01-17

## 2018-12-18 NOTE — PROGRESS NOTES
REASON FOR VISIT:    Sahara Valdez is a 76year old male who presents for a MA Supervisit.     DM - glu has been well controlled     -flu shot     Nocturia - 2x/month   Upper back discomfort related to driving  Resolves if     He has had 2 episodes of choki mouth as needed for Erectile Dysfunction. Disp: 12 tablet Rfl: 3   tamsulosin HCl 0.4 MG Oral Cap Take 1 capsule (0.4 mg total) by mouth daily.  Disp: 90 capsule Rfl: 1   Blood Glucose Monitoring Suppl (ACCU-CHEK JAQUELINE CONNECT) W/DEVICE Does not apply Kit 1 Ref Rng & Units 12/18/2018 5/12/2018 2/9/2017 5/6/2016 1/21/2015 11/25/2014 5/8/2013   AST 15 - 41 U/L 34 25 34 40 25 24 39   ALT 17 - 63 U/L 54 45 65(H) 78(H) 47 55 87(H)      [facility] WELLNESS LAB REVIEW FLOWSHEET PSA Latest Ref Rng & Units 9/25/2012   PSA % will?: No     Cognitive Assessment     What day of the week is this?: Correct  What month is it?: Correct  What year is it?: Correct  Recall \"Ball\": Correct  Recall \"Flag\": Correct  Recall \"Tree\": Correct         PREVENTATIVE SERVICES   INDICATIONS A Value   05/08/2013 1.1     Creatinine (mg/dL)   Date Value   12/18/2018 1.09       LDL  Annually LDL CHOLESTROL (mg/dL)   Date Value   05/08/2013 98     LDL Cholesterol (mg/dL)   Date Value   12/18/2018 101 (H)       Dilated Eye exam  Annually Data entered Arthritis Father    • Cancer Father    • Heart Disorder Father    • Other (diabetes mellitus[other]) Mother    • Arthritis Mother    • Diabetes Mother      Immunization History      Immunization History  Administered            Date(s) Administered    FLUA lesions  HEENT: atraumatic, normocephalic, ears and throat are clear                Hearing Assessed via: Finger Rub  EYES:  conjunctiva are clear   CHEST: no chest tenderness  LUNGS: clear to auscultation  CARDIO: RRR without murmur  GI: good BS's, no mas dysfunction -chronic, stable. Cont sildenafil as needed     Dyslipidemia -chronic, stable.  Cont statin     Coronary atherosclerosis -chronic but with new symptoms of chest tightness; cont asa/statin and see cardiology urgently     BPH (benign prostatic hyp comorbidity (HCC)-chronic, stable. Management as above with diet, exercise when cleared by cardiology     KRISTEN (obstructive sleep apnea) -chronic, stable. Cont CPAP     Arthritis of lumbar spine -chronic, stable.  Cont present management         The patient

## 2018-12-18 NOTE — PATIENT INSTRUCTIONS
Call your cardiologist Dr. Michele Pillai today to schedule appointment     Diabetic eye exam due in April 2019     Nighat recommended     Start tamsulosin and follow up with Dr. Vee Guthrie if symptoms do not improve over the next 2 months

## 2018-12-19 LAB
ALKALINE PHOSPHATATE(ALK PHOS): 50 IU/L
BILIRUBIN TOTAL: 0.5 MG/DL
BUN: 16 MG/DL
CALCIUM: 8.8 MG/DL
CHLORIDE: 108 MEQ/L
CHOLESTEROL, TOTAL: 175 MG/DL
CREATININE, SERUM: 1.09 MG/DL
GLUCOSE: 99 MG/DL
HDL CHOLESTEROL: 42 MG/DL
HEMOGLOBIN A1C: 6.4 %
LDL CHOLESTEROL: 101 MG/DL
POTASSIUM, SERUM: 4.3 MEQ/L
PROTEIN, TOTAL: 7.2 G/DL
SGOT (AST): 34 IU/L
SGPT (ALT): 54 IU/L
SODIUM: 137 MEQ/L
TRIGLYCERIDES: 161 MG/DL

## 2018-12-20 ENCOUNTER — HOSPITAL ENCOUNTER (OUTPATIENT)
Dept: GENERAL RADIOLOGY | Facility: HOSPITAL | Age: 68
Discharge: HOME | End: 2018-12-20
Attending: INTERNAL MEDICINE
Payer: MEDICARE

## 2018-12-20 ENCOUNTER — PRIOR ORIGINAL RECORDS (OUTPATIENT)
Dept: OTHER | Age: 68
End: 2018-12-20

## 2018-12-20 ENCOUNTER — MYAURORA ACCOUNT LINK (OUTPATIENT)
Dept: OTHER | Age: 68
End: 2018-12-20

## 2018-12-20 DIAGNOSIS — R06.00 DYSPNEA: ICD-10-CM

## 2018-12-20 PROCEDURE — 71046 X-RAY EXAM CHEST 2 VIEWS: CPT | Performed by: INTERNAL MEDICINE

## 2018-12-26 ENCOUNTER — LAB ENCOUNTER (OUTPATIENT)
Dept: LAB | Facility: HOSPITAL | Age: 68
End: 2018-12-26
Attending: INTERNAL MEDICINE
Payer: MEDICARE

## 2018-12-26 ENCOUNTER — PRIOR ORIGINAL RECORDS (OUTPATIENT)
Dept: OTHER | Age: 68
End: 2018-12-26

## 2018-12-26 DIAGNOSIS — Z01.818 PREOP EXAMINATION: Primary | ICD-10-CM

## 2018-12-26 DIAGNOSIS — I25.10 CORONARY ATHEROSCLEROSIS OF NATIVE CORONARY ARTERY: ICD-10-CM

## 2018-12-26 PROCEDURE — 85025 COMPLETE CBC W/AUTO DIFF WBC: CPT

## 2018-12-26 PROCEDURE — 36415 COLL VENOUS BLD VENIPUNCTURE: CPT

## 2018-12-27 LAB
HEMATOCRIT: 48.1 %
HEMOGLOBIN: 16.4 G/DL
PLATELETS: 235 K/UL
RED BLOOD COUNT: 5.28 X 10-6/U
WHITE BLOOD COUNT: 7.9 X 10-3/U

## 2018-12-27 RX ORDER — GLIPIZIDE 5 MG/1
5 TABLET ORAL
COMMUNITY
End: 2019-08-06

## 2018-12-27 NOTE — HISTORICAL OFFICE NOTE
Jan Alaniz  : 1950  ACCOUNT:  [id number]  [phone]  PCP: Dr. Jad Perez     TODAY'S DATE: 2018  DICTATED BY:  [Dr. Zaragoza Phlegm: [Followup of .  CAD, of native vessels.]    HPI:    [On 2018, Mynor Myers, a 79 year nourished and obese. WEIGHT: BMI parameters reviewed and discussed. HEAD/FACE: no trauma and normocephalic. EYES: conjunctivae not injected and no xanthelasma. ENT: mucosa pink and moist. NECK: jugular venous pressure not elevated.  RESP: respirations with Succinate 25MG      1 TABLET EVERY DAY                       01/11/18 *Lisinopril           5MG       1 TABLET EVERY DAY                       01/11/18 *Metoprolol Succinate 25MG      1 TABLET EVERY DAY                       05/11/16 *Vytorin

## 2018-12-27 NOTE — HISTORICAL OFFICE NOTE
Marion Waggoner  : 1950  ACCOUNT:  [id number]  [phone]  PCP: Dr. Ovidio Butt     TODAY'S DATE: 2018  DICTATED BY:  SAPNA Melton]      CHIEF COMPLAINT: [Chest pain.]    HPI:    [On 2018, Herb Stewart, a 76year old male, presented well developed, well nourished and obese. WEIGHT: BMI parameters reviewed and discussed. HEAD/FACE: no trauma and normocephalic. EYES: conjunctivae not injected and no xanthelasma. ENT: mucosa pink and moist. NECK: jugular venous pressure not elevated.  RES Work on diet, weight loss.   Repeat labs in 3 months.]    PRESCRIPTIONS:   12/19/18 *Metoprolol Succinate 25MG      1 TABLET EVERY DAY                       05/16/18 *Lisinopril           5MG       1 TABLET EVERY DAY                       05/11/16 *Vytorin

## 2018-12-28 ENCOUNTER — PRIOR ORIGINAL RECORDS (OUTPATIENT)
Dept: OTHER | Age: 68
End: 2018-12-28

## 2019-01-01 ENCOUNTER — EXTERNAL RECORD (OUTPATIENT)
Dept: HEALTH INFORMATION MANAGEMENT | Facility: OTHER | Age: 69
End: 2019-01-01

## 2019-01-03 ENCOUNTER — PRIOR ORIGINAL RECORDS (OUTPATIENT)
Dept: OTHER | Age: 69
End: 2019-01-03

## 2019-01-04 ENCOUNTER — HOSPITAL ENCOUNTER (OUTPATIENT)
Dept: INTERVENTIONAL RADIOLOGY/VASCULAR | Facility: HOSPITAL | Age: 69
Discharge: HOME | End: 2019-01-04
Attending: INTERNAL MEDICINE | Admitting: INTERNAL MEDICINE
Payer: MEDICARE

## 2019-01-04 VITALS
HEIGHT: 73 IN | HEART RATE: 64 BPM | RESPIRATION RATE: 17 BRPM | WEIGHT: 283 LBS | DIASTOLIC BLOOD PRESSURE: 70 MMHG | BODY MASS INDEX: 37.51 KG/M2 | SYSTOLIC BLOOD PRESSURE: 130 MMHG | OXYGEN SATURATION: 97 %

## 2019-01-04 DIAGNOSIS — I25.10 CAD (CORONARY ARTERY DISEASE): ICD-10-CM

## 2019-01-04 DIAGNOSIS — I25.2 MI, OLD: ICD-10-CM

## 2019-01-04 PROBLEM — I25.110 CORONARY ARTERY DISEASE INVOLVING NATIVE CORONARY ARTERY WITH UNSTABLE ANGINA PECTORIS (HCC): Status: ACTIVE | Noted: 2019-01-04

## 2019-01-04 LAB
ATRIAL RATE: 72 BPM
GLUCOSE BLD-MCNC: 109 MG/DL (ref 65–99)
P AXIS: 30 DEGREES
P-R INTERVAL: 188 MS
Q-T INTERVAL: 396 MS
QRS DURATION: 112 MS
QTC CALCULATION (BEZET): 433 MS
R AXIS: 61 DEGREES
T AXIS: -7 DEGREES
VENTRICULAR RATE: 72 BPM

## 2019-01-04 PROCEDURE — 93459 L HRT ART/GRFT ANGIO: CPT

## 2019-01-04 PROCEDURE — 99152 MOD SED SAME PHYS/QHP 5/>YRS: CPT

## 2019-01-04 PROCEDURE — 93010 ELECTROCARDIOGRAM REPORT: CPT | Performed by: INTERNAL MEDICINE

## 2019-01-04 PROCEDURE — B2181ZZ FLUOROSCOPY OF LEFT INTERNAL MAMMARY BYPASS GRAFT USING LOW OSMOLAR CONTRAST: ICD-10-PCS | Performed by: INTERNAL MEDICINE

## 2019-01-04 PROCEDURE — 75625 CONTRAST EXAM ABDOMINL AORTA: CPT

## 2019-01-04 PROCEDURE — 93005 ELECTROCARDIOGRAM TRACING: CPT

## 2019-01-04 PROCEDURE — 82962 GLUCOSE BLOOD TEST: CPT

## 2019-01-04 PROCEDURE — 4A023N7 MEASUREMENT OF CARDIAC SAMPLING AND PRESSURE, LEFT HEART, PERCUTANEOUS APPROACH: ICD-10-PCS | Performed by: INTERNAL MEDICINE

## 2019-01-04 PROCEDURE — 99153 MOD SED SAME PHYS/QHP EA: CPT

## 2019-01-04 PROCEDURE — B2111ZZ FLUOROSCOPY OF MULTIPLE CORONARY ARTERIES USING LOW OSMOLAR CONTRAST: ICD-10-PCS | Performed by: INTERNAL MEDICINE

## 2019-01-04 PROCEDURE — B2131ZZ FLUOROSCOPY OF MULTIPLE CORONARY ARTERY BYPASS GRAFTS USING LOW OSMOLAR CONTRAST: ICD-10-PCS | Performed by: INTERNAL MEDICINE

## 2019-01-04 PROCEDURE — B2151ZZ FLUOROSCOPY OF LEFT HEART USING LOW OSMOLAR CONTRAST: ICD-10-PCS | Performed by: INTERNAL MEDICINE

## 2019-01-04 RX ORDER — ASPIRIN 81 MG/1
324 TABLET, CHEWABLE ORAL ONCE
Status: DISCONTINUED | OUTPATIENT
Start: 2019-01-04 | End: 2019-01-04 | Stop reason: HOSPADM

## 2019-01-04 RX ORDER — ATORVASTATIN CALCIUM 40 MG/1
40 TABLET, FILM COATED ORAL NIGHTLY
Qty: 30 TABLET | Refills: 0 | Status: SHIPPED | OUTPATIENT
Start: 2019-01-04 | End: 2019-02-27 | Stop reason: ALTCHOICE

## 2019-01-04 RX ORDER — HEPARIN SODIUM 5000 [USP'U]/ML
INJECTION, SOLUTION INTRAVENOUS; SUBCUTANEOUS
Status: COMPLETED
Start: 2019-01-04 | End: 2019-01-04

## 2019-01-04 RX ORDER — CLOPIDOGREL BISULFATE 75 MG/1
75 TABLET ORAL DAILY
Qty: 30 TABLET | Refills: 0 | Status: SHIPPED | OUTPATIENT
Start: 2019-01-04

## 2019-01-04 RX ORDER — LIDOCAINE HYDROCHLORIDE 10 MG/ML
INJECTION, SOLUTION EPIDURAL; INFILTRATION; INTRACAUDAL; PERINEURAL
Status: COMPLETED
Start: 2019-01-04 | End: 2019-01-04

## 2019-01-04 RX ORDER — SODIUM CHLORIDE 9 MG/ML
INJECTION, SOLUTION INTRAVENOUS CONTINUOUS
Status: DISCONTINUED | OUTPATIENT
Start: 2019-01-04 | End: 2019-01-04

## 2019-01-04 RX ORDER — MIDAZOLAM HYDROCHLORIDE 1 MG/ML
INJECTION INTRAMUSCULAR; INTRAVENOUS
Status: COMPLETED
Start: 2019-01-04 | End: 2019-01-04

## 2019-01-04 RX ADMIN — SODIUM CHLORIDE: 9 INJECTION, SOLUTION INTRAVENOUS at 12:00:00

## 2019-01-04 NOTE — PROGRESS NOTES
[facility]  [facility]  Cath Note    Julio Cesar Everett Location: Cath lab     MRN [id number]   Admission Date 1/4/2019 Operation Date 1/4/2019   Attending Physician Bj Galvin MD Operating Physician Leela Sharma MD     Pre-Cath Diagnosis: CA

## 2019-01-04 NOTE — PROGRESS NOTES
Rc'd pt from cath lab in stable condition. VSS. Mynx to right groin is soft, clean and dry. No bleeding or hematoma. Pt denies c/o pain or discomfort. Dr Claire Schaumann at bedside. Pt to return 1/14 for PCI. Script for Plavix given to pt.  Pt to continue 325mg asp

## 2019-01-05 NOTE — PROCEDURES
[facility]    PATIENT'S NAME: Juliette Moreno   ATTENDING PHYSICIAN: Maren Scheuermann, M.D. OPERATING PHYSICIAN: Maren Scheuermann, M.D.    PATIENT ACCOUNT#:   [de-identified]    LOCATION:  [place] [address] EDP  MEDICAL RECORD #:   [id number]       DATE OF B 105.  The LVEDP is 20 to 30. No gradient is noted across the aortic valve on pullback. LEFT VENTRICULOGRAPHY:  Left ventricular size appears normal.  No ectopy is noted during the contrast injection.   Left ventricular systolic function appears lower no disease. There is a 50% stenosis noted in the native PDA just distal to the bypass graft. This graft provides retrograde filling of the native RCA and a posterior left ventricular branch of the RCA.   These vessels appear to have diffuse moderate atherosc marginal, and right posterior descending coronary arteries as described.     RECOMMENDATIONS:  While overall, the patient appears to have good surgical revascularization, an unbypassed obtuse marginal branch and native circumflex coronary artery are supplie

## 2019-01-11 NOTE — HISTORICAL OFFICE NOTE
Gloria Ruifno  : 1950  ACCOUNT:  [id number]  [phone]  PCP: Dr. Keo Silverio     TODAY'S DATE: 2018  DICTATED BY:  SAPNA Ventura]      CHIEF COMPLAINT: [Chest pain.]    HPI:    [On 2018, Rafael Hurtado, a 76year old male, presented well developed, well nourished and obese. WEIGHT: BMI parameters reviewed and discussed. HEAD/FACE: no trauma and normocephalic. EYES: conjunctivae not injected and no xanthelasma. ENT: mucosa pink and moist. NECK: jugular venous pressure not elevated.  RES Work on diet, weight loss.   Repeat labs in 3 months.]    PRESCRIPTIONS:   12/19/18 *Metoprolol Succinate 25MG      1 TABLET EVERY DAY                       05/16/18 *Lisinopril           5MG       1 TABLET EVERY DAY                       05/11/16 *Vytorin

## 2019-01-11 NOTE — HISTORICAL OFFICE NOTE
Christa Thomas  : 1950  ACCOUNT:  [id number]  [phone]  PCP: Dr. Muse Royalty     TODAY'S DATE: 2018  DICTATED BY:  [Dr. Valverde Italian: [Followup of .  CAD, of native vessels.]    HPI:    [On 2018, Darcy Johnson, a 79 year nourished and obese. WEIGHT: BMI parameters reviewed and discussed. HEAD/FACE: no trauma and normocephalic. EYES: conjunctivae not injected and no xanthelasma. ENT: mucosa pink and moist. NECK: jugular venous pressure not elevated.  RESP: respirations with Succinate 25MG      1 TABLET EVERY DAY                       01/11/18 *Lisinopril           5MG       1 TABLET EVERY DAY                       01/11/18 *Metoprolol Succinate 25MG      1 TABLET EVERY DAY                       05/11/16 *Vytorin

## 2019-01-14 ENCOUNTER — HOSPITAL ENCOUNTER (OUTPATIENT)
Dept: INTERVENTIONAL RADIOLOGY/VASCULAR | Facility: HOSPITAL | Age: 69
Discharge: HOME | End: 2019-01-15
Attending: INTERNAL MEDICINE | Admitting: INTERNAL MEDICINE
Payer: MEDICARE

## 2019-01-14 DIAGNOSIS — I25.10 CAD (CORONARY ARTERY DISEASE): ICD-10-CM

## 2019-01-14 LAB
APTT PPP: 51.3 SECONDS (ref 26.1–34.6)
GLUCOSE BLD-MCNC: 115 MG/DL (ref 65–99)
GLUCOSE BLD-MCNC: 120 MG/DL (ref 65–99)
GLUCOSE BLD-MCNC: 99 MG/DL (ref 65–99)
INR BLD: 1.14 (ref 0.9–1.1)
ISTAT ACTIVATED CLOTTING TIME: 362 SECONDS (ref 74–137)
PSA SERPL DL<=0.01 NG/ML-MCNC: 15.1 SECONDS (ref 12.4–14.7)

## 2019-01-14 PROCEDURE — 85730 THROMBOPLASTIN TIME PARTIAL: CPT | Performed by: INTERNAL MEDICINE

## 2019-01-14 PROCEDURE — 3E033PZ INTRODUCTION OF PLATELET INHIBITOR INTO PERIPHERAL VEIN, PERCUTANEOUS APPROACH: ICD-10-PCS | Performed by: INTERNAL MEDICINE

## 2019-01-14 PROCEDURE — 99152 MOD SED SAME PHYS/QHP 5/>YRS: CPT

## 2019-01-14 PROCEDURE — 82962 GLUCOSE BLOOD TEST: CPT

## 2019-01-14 PROCEDURE — 92920 PRQ TRLUML C ANGIOP 1ART&/BR: CPT

## 2019-01-14 PROCEDURE — B2101ZZ FLUOROSCOPY OF SINGLE CORONARY ARTERY USING LOW OSMOLAR CONTRAST: ICD-10-PCS | Performed by: INTERNAL MEDICINE

## 2019-01-14 PROCEDURE — 85610 PROTHROMBIN TIME: CPT | Performed by: INTERNAL MEDICINE

## 2019-01-14 PROCEDURE — 85347 COAGULATION TIME ACTIVATED: CPT

## 2019-01-14 PROCEDURE — 99153 MOD SED SAME PHYS/QHP EA: CPT

## 2019-01-14 RX ORDER — GLIPIZIDE 5 MG/1
5 TABLET ORAL
Status: DISCONTINUED | OUTPATIENT
Start: 2019-01-16 | End: 2019-01-15

## 2019-01-14 RX ORDER — NITROGLYCERIN 20 MG/100ML
5 INJECTION INTRAVENOUS CONTINUOUS
Status: DISCONTINUED | OUTPATIENT
Start: 2019-01-14 | End: 2019-01-15

## 2019-01-14 RX ORDER — ACETAMINOPHEN 325 MG/1
650 TABLET ORAL EVERY 6 HOURS PRN
Status: DISCONTINUED | OUTPATIENT
Start: 2019-01-14 | End: 2019-01-15

## 2019-01-14 RX ORDER — EZETIMIBE AND SIMVASTATIN 10; 80 MG/1; MG/1
1 TABLET ORAL NIGHTLY
Status: DISCONTINUED | OUTPATIENT
Start: 2019-01-14 | End: 2019-01-14 | Stop reason: ALTCHOICE

## 2019-01-14 RX ORDER — NITROGLYCERIN 20 MG/100ML
INJECTION INTRAVENOUS
Status: COMPLETED
Start: 2019-01-14 | End: 2019-01-14

## 2019-01-14 RX ORDER — MIDAZOLAM HYDROCHLORIDE 1 MG/ML
INJECTION INTRAMUSCULAR; INTRAVENOUS
Status: COMPLETED
Start: 2019-01-14 | End: 2019-01-14

## 2019-01-14 RX ORDER — CLOPIDOGREL BISULFATE 75 MG/1
75 TABLET ORAL DAILY
Status: DISCONTINUED | OUTPATIENT
Start: 2019-01-15 | End: 2019-01-15

## 2019-01-14 RX ORDER — ATORVASTATIN CALCIUM 40 MG/1
40 TABLET, FILM COATED ORAL NIGHTLY
Status: DISCONTINUED | OUTPATIENT
Start: 2019-01-14 | End: 2019-01-15

## 2019-01-14 RX ORDER — CLOPIDOGREL BISULFATE 75 MG/1
75 TABLET ORAL DAILY
Status: DISCONTINUED | OUTPATIENT
Start: 2019-01-14 | End: 2019-01-14

## 2019-01-14 RX ORDER — HEPARIN SODIUM 5000 [USP'U]/ML
INJECTION, SOLUTION INTRAVENOUS; SUBCUTANEOUS
Status: COMPLETED
Start: 2019-01-14 | End: 2019-01-14

## 2019-01-14 RX ORDER — LISINOPRIL 5 MG/1
5 TABLET ORAL DAILY
Status: DISCONTINUED | OUTPATIENT
Start: 2019-01-15 | End: 2019-01-15

## 2019-01-14 RX ORDER — METOPROLOL SUCCINATE 25 MG/1
25 TABLET, EXTENDED RELEASE ORAL
Status: DISCONTINUED | OUTPATIENT
Start: 2019-01-15 | End: 2019-01-15

## 2019-01-14 RX ORDER — GLIPIZIDE 5 MG/1
5 TABLET ORAL
Status: DISCONTINUED | OUTPATIENT
Start: 2019-01-14 | End: 2019-01-14

## 2019-01-14 RX ORDER — SODIUM CHLORIDE 9 MG/ML
INJECTION, SOLUTION INTRAVENOUS CONTINUOUS
Status: ACTIVE | OUTPATIENT
Start: 2019-01-14 | End: 2019-01-14

## 2019-01-14 RX ORDER — ASPIRIN 81 MG/1
324 TABLET, CHEWABLE ORAL ONCE
Status: DISCONTINUED | OUTPATIENT
Start: 2019-01-14 | End: 2019-01-14 | Stop reason: HOSPADM

## 2019-01-14 RX ORDER — LIDOCAINE HYDROCHLORIDE 10 MG/ML
INJECTION, SOLUTION EPIDURAL; INFILTRATION; INTRACAUDAL; PERINEURAL
Status: COMPLETED
Start: 2019-01-14 | End: 2019-01-14

## 2019-01-14 RX ORDER — ASPIRIN 325 MG
325 TABLET ORAL DAILY
Status: DISCONTINUED | OUTPATIENT
Start: 2019-01-15 | End: 2019-01-15

## 2019-01-14 RX ORDER — SODIUM CHLORIDE 9 MG/ML
INJECTION, SOLUTION INTRAVENOUS CONTINUOUS
Status: DISCONTINUED | OUTPATIENT
Start: 2019-01-14 | End: 2019-01-15

## 2019-01-14 RX ADMIN — NITROGLYCERIN 5 MCG/MIN: 20 INJECTION INTRAVENOUS at 13:34:00

## 2019-01-14 RX ADMIN — SODIUM CHLORIDE: 9 INJECTION, SOLUTION INTRAVENOUS at 10:30:00

## 2019-01-14 RX ADMIN — NITROGLYCERIN 5 MCG/MIN: 20 INJECTION INTRAVENOUS at 17:41:00

## 2019-01-14 RX ADMIN — ATORVASTATIN CALCIUM 40 MG: 40 TABLET, FILM COATED ORAL at 20:15:00

## 2019-01-14 RX ADMIN — ACETAMINOPHEN 650 MG: 325 TABLET ORAL at 13:34:00

## 2019-01-14 RX ADMIN — SODIUM CHLORIDE: 9 INJECTION, SOLUTION INTRAVENOUS at 07:15:00

## 2019-01-14 NOTE — DIETARY NOTE
Nutrition Short Note    Dietitian consult received per cardiac rehab/CHF protocol. Pt to be educated by cardiac rehab staff and encouraged to attend outpatient classes taught by MAX. RD available PRN.     Susie Marin RD, LDN  Pager #[phone]

## 2019-01-14 NOTE — PROGRESS NOTES
Received patient from cath lab s/p attempted PCI of left main; a/ox4; hemodynamically stable/neurologically intact; right groin site angiosealed soft/stable with no bleeding/hematoma noted; ntg gtt infusing @ 5 mcg/min per md order; c/o mild headache; bairon

## 2019-01-14 NOTE — H&P
History & Physical Examination    Patient Name: Myla Loveebonie  MRN: [id number]  CSN: [id number]  YOB: 1950    Diagnosis: Coronary artery disease, status post coronary artery bypass surgery    Present Illness: Left main PCI      Medications Prio Once   iohexol (OMNIPAQUE) 350 MG/ML injection 150 mL 150 mL Injection ONCE PRN       Allergies: No Known Allergies    Past Medical History:   Diagnosis Date   • AAA (abdominal aortic aneurysm) ([facility] [address] 12/30/2014   • Calculus of kidney    • Carotid stenosis Smoker        Packs/day: 1.00        Years: 40.00        Pack years: 36        Quit date: 2003        Years since quittin.0      Smokeless tobacco: Never Used    Alcohol use: Yes      Comment: occ      SYSTEM Check if Review is Normal Check if Phy

## 2019-01-14 NOTE — PROCEDURES
[facility]  PCI Procedure Note    Rylie Wilson Location: Cath Lab    CSN [id number] MRN [id number]   Admission Date 1/14/2019 Procedure Date 1/14/2019   Attending Physician Leona Kat MD Procedure Physician Maria Del Rosario Chung MD     Pre-Procedure Sara decision was made to terminate the procedure and try an initial strategy of medical management before attempting a retrograde procedure. The right femoral artery was closed with an 8 [employer] Desirae Angio-Seal device.     IV was maintained by RN and moderate cons

## 2019-01-15 ENCOUNTER — PRIOR ORIGINAL RECORDS (OUTPATIENT)
Dept: OTHER | Age: 69
End: 2019-01-15

## 2019-01-15 VITALS
SYSTOLIC BLOOD PRESSURE: 132 MMHG | OXYGEN SATURATION: 96 % | TEMPERATURE: 98 F | WEIGHT: 283 LBS | BODY MASS INDEX: 37.51 KG/M2 | HEART RATE: 80 BPM | HEIGHT: 73 IN | RESPIRATION RATE: 15 BRPM | DIASTOLIC BLOOD PRESSURE: 58 MMHG

## 2019-01-15 LAB
ANION GAP SERPL CALC-SCNC: 5 MMOL/L (ref 0–18)
BUN BLD-MCNC: 14 MG/DL (ref 8–20)
BUN/CREAT SERPL: 13.1 (ref 10–20)
CALCIUM BLD-MCNC: 8.7 MG/DL (ref 8.3–10.3)
CHLORIDE SERPL-SCNC: 107 MMOL/L (ref 101–111)
CO2 SERPL-SCNC: 29 MMOL/L (ref 22–32)
CREAT BLD-MCNC: 1.07 MG/DL (ref 0.7–1.3)
ERYTHROCYTE [DISTWIDTH] IN BLOOD BY AUTOMATED COUNT: 12.3 % (ref 11.5–16)
GLUCOSE BLD-MCNC: 122 MG/DL (ref 65–99)
GLUCOSE BLD-MCNC: 122 MG/DL (ref 70–99)
GLUCOSE BLD-MCNC: 99 MG/DL (ref 65–99)
HCT VFR BLD AUTO: 44.9 % (ref 37–53)
HGB BLD-MCNC: 14.9 G/DL (ref 13–17)
MCH RBC QN AUTO: 30.5 PG (ref 27–33.2)
MCHC RBC AUTO-ENTMCNC: 33.2 G/DL (ref 31–37)
MCV RBC AUTO: 91.8 FL (ref 80–99)
OSMOLALITY SERPL CALC.SUM OF ELEC: 294 MOSM/KG (ref 275–295)
PLATELET # BLD AUTO: 206 10(3)UL (ref 150–450)
POTASSIUM SERPL-SCNC: 4.3 MMOL/L (ref 3.6–5.1)
RBC # BLD AUTO: 4.89 X10(6)UL (ref 3.8–5.8)
RED CELL DISTRIBUTION WIDTH-SD: 41.3 FL (ref 35.1–46.3)
SODIUM SERPL-SCNC: 141 MMOL/L (ref 136–144)
TROPONIN I SERPL-MCNC: <0.046 NG/ML (ref ?–0.05)
WBC # BLD AUTO: 7.4 X10(3) UL (ref 4–13)

## 2019-01-15 PROCEDURE — 80048 BASIC METABOLIC PNL TOTAL CA: CPT | Performed by: INTERNAL MEDICINE

## 2019-01-15 PROCEDURE — 84484 ASSAY OF TROPONIN QUANT: CPT | Performed by: INTERNAL MEDICINE

## 2019-01-15 PROCEDURE — 82962 GLUCOSE BLOOD TEST: CPT

## 2019-01-15 PROCEDURE — 85027 COMPLETE CBC AUTOMATED: CPT | Performed by: INTERNAL MEDICINE

## 2019-01-15 RX ORDER — METOPROLOL SUCCINATE 50 MG/1
50 TABLET, EXTENDED RELEASE ORAL
Qty: 30 TABLET | Refills: 3 | Status: SHIPPED | OUTPATIENT
Start: 2019-01-16 | End: 2019-10-22

## 2019-01-15 RX ORDER — AMLODIPINE BESYLATE 5 MG/1
5 TABLET ORAL DAILY
Qty: 30 TABLET | Refills: 3 | Status: SHIPPED | OUTPATIENT
Start: 2019-01-15 | End: 2019-10-22

## 2019-01-15 RX ORDER — AMLODIPINE BESYLATE 5 MG/1
5 TABLET ORAL DAILY
Status: DISCONTINUED | OUTPATIENT
Start: 2019-01-15 | End: 2019-01-15

## 2019-01-15 RX ORDER — METOPROLOL SUCCINATE 50 MG/1
50 TABLET, EXTENDED RELEASE ORAL
Status: DISCONTINUED | OUTPATIENT
Start: 2019-01-16 | End: 2019-01-15

## 2019-01-15 RX ADMIN — NITROGLYCERIN 5 MCG/MIN: 20 INJECTION INTRAVENOUS at 07:49:00

## 2019-01-15 RX ADMIN — CLOPIDOGREL BISULFATE 75 MG: 75 TABLET ORAL at 08:27:00

## 2019-01-15 RX ADMIN — ASPIRIN 325 MG: 325 MG TABLET ORAL at 08:28:00

## 2019-01-15 RX ADMIN — AMLODIPINE BESYLATE 5 MG: 5 TABLET ORAL at 13:22:00

## 2019-01-15 RX ADMIN — METOPROLOL SUCCINATE 25 MG: 25 TABLET, EXTENDED RELEASE ORAL at 05:40:00

## 2019-01-15 RX ADMIN — LISINOPRIL 5 MG: 5 TABLET ORAL at 08:27:00

## 2019-01-15 NOTE — PROGRESS NOTES
[facility]  Cardiology Progress Note    Subjective:  No chest pain or shortness of breath. (R) groin mildly tender, no obvious hematoma.     Objective:  /66 (BP Location: Right arm)   Pulse 79   Temp 98.4 °F (36.9 °C) (Oral)   Resp 14   Ht 6' 1\ Cervical spine arthritis (HCC)     Foraminal stenosis of cervical region     Facet arthritis of cervical region [facility])     Cervical disc herniation     PVC (premature ventricular contraction)     DM (diabetes mellitus) type II, controlled, with peripheral v

## 2019-01-15 NOTE — PLAN OF CARE
CARDIOVASCULAR - ADULT    • Maintains optimal cardiac output and hemodynamic stability Progressing    • Absence of cardiac arrhythmias or at baseline Progressing    NSR on telemetry. VSS. No c/o cardiac symptoms. Nitro gtt maintained as ordered.  Right g

## 2019-01-17 ENCOUNTER — TELEPHONE (OUTPATIENT)
Dept: INTERNAL MEDICINE CLINIC | Facility: CLINIC | Age: 69
End: 2019-01-17

## 2019-01-17 NOTE — TELEPHONE ENCOUNTER
Per pt chart I do not see where pt is to discontinue tamsulosin on 17th. Spoke with pt and he stated on his discharge paperwork it stated for him to stop the medication on 1/17/19.  Advised pt that Dr. Soila Baer ordered medication as 90 day supply with 1 refil

## 2019-01-17 NOTE — TELEPHONE ENCOUNTER
Patient called in stated he has been taking TX Tamsulosin, since December 20th. Pt stated there is note in his chart to discontinue taking the medication after the 17th, however, pt feels as though he needs to continue taking it . Please advise.

## 2019-02-06 DIAGNOSIS — Z87.891 PERSONAL HISTORY OF TOBACCO USE, PRESENTING HAZARDS TO HEALTH: Primary | ICD-10-CM

## 2019-02-09 ENCOUNTER — HOSPITAL ENCOUNTER (OUTPATIENT)
Dept: CT IMAGING | Facility: HOSPITAL | Age: 69
Discharge: HOME | End: 2019-02-09
Attending: CLINICAL NURSE SPECIALIST
Payer: MEDICARE

## 2019-02-09 DIAGNOSIS — Z87.891 PERSONAL HISTORY OF TOBACCO USE, PRESENTING HAZARDS TO HEALTH: ICD-10-CM

## 2019-02-11 NOTE — PROGRESS NOTES
Screening lung CT reviewed-no concerning lung nodules. Findings consistent with bronchitis, seen on previous imaging. Notified patient. Recommend follow up in the office in the next 2-4 weeks with PFT prior to appointment.   He will need to show complian

## 2019-02-13 ENCOUNTER — PRIOR ORIGINAL RECORDS (OUTPATIENT)
Dept: OTHER | Age: 69
End: 2019-02-13

## 2019-02-13 ENCOUNTER — MYAURORA ACCOUNT LINK (OUTPATIENT)
Dept: OTHER | Age: 69
End: 2019-02-13

## 2019-02-13 LAB
BUN: 14 MG/DL
CALCIUM: 8.7 MG/DL
CHLORIDE: 107 MEQ/L
CREATININE, SERUM: 1.07 MG/DL
GLUCOSE: 122 MG/DL
HEMATOCRIT: 44.9 %
HEMOGLOBIN: 14.9 G/DL
PLATELETS: 206 K/UL
POTASSIUM, SERUM: 4.3 MEQ/L
RED BLOOD COUNT: 4.89 X 10-6/U
SODIUM: 141 MEQ/L
WHITE BLOOD COUNT: 7.4 X 10-3/U

## 2019-02-13 RX ORDER — LANCETS
EACH MISCELLANEOUS
Qty: 102 EACH | Refills: 0 | Status: SHIPPED | OUTPATIENT
Start: 2019-02-13 | End: 2020-02-27

## 2019-02-13 RX ORDER — BLOOD-GLUCOSE METER
1 EACH MISCELLANEOUS DAILY
Qty: 1 KIT | Refills: 0 | Status: SHIPPED | OUTPATIENT
Start: 2019-02-13 | End: 2020-07-01

## 2019-02-13 NOTE — TELEPHONE ENCOUNTER
Refill requested:   Requested Prescriptions     Pending Prescriptions Disp Refills   • ACCU-CHEK FASTCLIX LANCETS Does not apply Misc 102 each 0     Sig: Use one lancet daily   • Blood Glucose Monitoring Suppl (ACCU-CHEK JAQUELINE CONNECT) w/Device Does not ap

## 2019-02-20 ENCOUNTER — HOSPITAL ENCOUNTER (OUTPATIENT)
Dept: CV DIAGNOSTICS | Facility: HOSPITAL | Age: 69
Discharge: HOME | End: 2019-02-20
Attending: INTERNAL MEDICINE
Payer: MEDICARE

## 2019-02-20 DIAGNOSIS — I25.10 CAD (CORONARY ARTERY DISEASE), NATIVE CORONARY ARTERY: ICD-10-CM

## 2019-02-20 DIAGNOSIS — R07.2 PRECORDIAL CHEST PAIN: ICD-10-CM

## 2019-02-20 PROCEDURE — 78452 HT MUSCLE IMAGE SPECT MULT: CPT | Performed by: INTERNAL MEDICINE

## 2019-02-20 PROCEDURE — 93017 CV STRESS TEST TRACING ONLY: CPT | Performed by: INTERNAL MEDICINE

## 2019-02-20 PROCEDURE — 93018 CV STRESS TEST I&R ONLY: CPT | Performed by: INTERNAL MEDICINE

## 2019-02-22 ENCOUNTER — PRIOR ORIGINAL RECORDS (OUTPATIENT)
Dept: OTHER | Age: 69
End: 2019-02-22

## 2019-02-26 ENCOUNTER — PRIOR ORIGINAL RECORDS (OUTPATIENT)
Dept: OTHER | Age: 69
End: 2019-02-26

## 2019-02-27 ENCOUNTER — PRIOR ORIGINAL RECORDS (OUTPATIENT)
Dept: OTHER | Age: 69
End: 2019-02-27

## 2019-02-28 VITALS
DIASTOLIC BLOOD PRESSURE: 62 MMHG | WEIGHT: 284 LBS | SYSTOLIC BLOOD PRESSURE: 126 MMHG | HEIGHT: 74 IN | BODY MASS INDEX: 36.45 KG/M2 | HEART RATE: 72 BPM

## 2019-02-28 VITALS
HEIGHT: 73 IN | SYSTOLIC BLOOD PRESSURE: 124 MMHG | BODY MASS INDEX: 37.77 KG/M2 | WEIGHT: 285 LBS | HEART RATE: 62 BPM | DIASTOLIC BLOOD PRESSURE: 60 MMHG

## 2019-02-28 VITALS
DIASTOLIC BLOOD PRESSURE: 60 MMHG | HEIGHT: 74 IN | BODY MASS INDEX: 35.04 KG/M2 | HEART RATE: 72 BPM | WEIGHT: 273 LBS | SYSTOLIC BLOOD PRESSURE: 132 MMHG

## 2019-03-01 VITALS
BODY MASS INDEX: 35.94 KG/M2 | HEART RATE: 72 BPM | DIASTOLIC BLOOD PRESSURE: 62 MMHG | HEIGHT: 74 IN | SYSTOLIC BLOOD PRESSURE: 112 MMHG | WEIGHT: 280 LBS

## 2019-03-13 ENCOUNTER — OFFICE VISIT (OUTPATIENT)
Dept: INTERNAL MEDICINE CLINIC | Facility: CLINIC | Age: 69
End: 2019-03-13
Payer: MEDICARE

## 2019-03-13 VITALS
HEIGHT: 74 IN | SYSTOLIC BLOOD PRESSURE: 118 MMHG | DIASTOLIC BLOOD PRESSURE: 72 MMHG | RESPIRATION RATE: 12 BRPM | WEIGHT: 281 LBS | TEMPERATURE: 98 F | BODY MASS INDEX: 36.06 KG/M2 | HEART RATE: 64 BPM

## 2019-03-13 DIAGNOSIS — J06.9 VIRAL UPPER RESPIRATORY TRACT INFECTION: Primary | ICD-10-CM

## 2019-03-13 PROCEDURE — 99213 OFFICE O/P EST LOW 20 MIN: CPT | Performed by: NURSE PRACTITIONER

## 2019-03-13 RX ORDER — CODEINE PHOSPHATE AND GUAIFENESIN 10; 100 MG/5ML; MG/5ML
5 SOLUTION ORAL EVERY 6 HOURS PRN
Qty: 120 ML | Refills: 0 | Status: SHIPPED | OUTPATIENT
Start: 2019-03-13 | End: 2019-06-12 | Stop reason: ALTCHOICE

## 2019-03-13 NOTE — PROGRESS NOTES
CHIEF COMPLAINT:   Patient presents with:  Cough: Pt c/o cough x 2 days with irritation to his throat. Feels some chest pain now from coughing so much.        HPI:   Darren Villavicencio is a 76year old male who presents for upper respiratory symptoms for  2 days acetaminophen (TYLENOL EXTRA STRENGTH) 500 MG Oral Tab Take 500 mg by mouth every 6 (six) hours as needed for Pain. Disp:  Rfl:    lisinopril (PRINIVIL,ZESTRIL) 5 MG Oral Tab Take 5 mg by mouth daily.  Disp:  Rfl:    Aspirin 325 MG Oral Tab Take 325 mg by Years since quittin.2      Smokeless tobacco: Never Used    Alcohol use: Yes      Comment: occ    Drug use: No        REVIEW OF SYSTEMS:   GENERAL: feels well otherwise,  No loss of appetite  SKIN: no rashes or abnormal skin lesions  HEENT: See H patient is asked to return if sx's persist or worsen.

## 2019-03-20 ENCOUNTER — TELEPHONE (OUTPATIENT)
Dept: INTERNAL MEDICINE CLINIC | Facility: CLINIC | Age: 69
End: 2019-03-20

## 2019-03-20 NOTE — TELEPHONE ENCOUNTER
1st outreach- Spoke with patient to set up [facility] 22 with Dr. Tereza Maria. Patient stated that he will call back in a couple of months to schedule.

## 2019-03-27 RX ORDER — LISINOPRIL 5 MG/1
1 TABLET ORAL DAILY
COMMUNITY
Start: 2018-05-16 | End: 2019-05-15 | Stop reason: SDUPTHER

## 2019-03-27 RX ORDER — METOPROLOL SUCCINATE 50 MG/1
1 TABLET, EXTENDED RELEASE ORAL DAILY
COMMUNITY
Start: 2019-02-13 | End: 2019-05-15 | Stop reason: SDUPTHER

## 2019-03-27 RX ORDER — CLOPIDOGREL BISULFATE 75 MG/1
TABLET ORAL
COMMUNITY
Start: 2019-08-02 | End: 2019-04-17 | Stop reason: SDUPTHER

## 2019-03-27 RX ORDER — ROSUVASTATIN CALCIUM 20 MG/1
40 TABLET, COATED ORAL DAILY
COMMUNITY
Start: 2019-02-13 | End: 2019-05-15 | Stop reason: SDUPTHER

## 2019-03-27 RX ORDER — AMLODIPINE BESYLATE 5 MG/1
1 TABLET ORAL DAILY
COMMUNITY
Start: 2019-02-13 | End: 2019-04-26 | Stop reason: SDUPTHER

## 2019-03-27 RX ORDER — GLIPIZIDE 5 MG/1
TABLET, FILM COATED, EXTENDED RELEASE ORAL
COMMUNITY
Start: 2012-09-26 | End: 2019-11-20 | Stop reason: ALTCHOICE

## 2019-03-27 RX ORDER — ASPIRIN 325 MG
TABLET ORAL
COMMUNITY
Start: 2009-09-12 | End: 2020-01-22 | Stop reason: DRUGHIGH

## 2019-03-27 RX ORDER — SILDENAFIL 100 MG/1
TABLET, FILM COATED ORAL
COMMUNITY
End: 2019-11-20 | Stop reason: ALTCHOICE

## 2019-04-17 RX ORDER — CLOPIDOGREL BISULFATE 75 MG/1
TABLET ORAL
Qty: 90 TABLET | Refills: 2 | Status: SHIPPED | OUTPATIENT
Start: 2019-04-17 | End: 2019-05-15 | Stop reason: SDUPTHER

## 2019-04-26 RX ORDER — OXYBUTYNIN CHLORIDE 5 MG/1
TABLET ORAL
Refills: 0 | COMMUNITY
Start: 2019-03-13 | End: 2019-05-15 | Stop reason: ALTCHOICE

## 2019-04-26 RX ORDER — TAMSULOSIN HYDROCHLORIDE 0.4 MG/1
0.4 CAPSULE ORAL 2 TIMES DAILY
COMMUNITY
Start: 2019-02-13

## 2019-04-26 RX ORDER — AMLODIPINE BESYLATE 5 MG/1
5 TABLET ORAL DAILY
Qty: 90 TABLET | Refills: 1 | Status: SHIPPED | OUTPATIENT
Start: 2019-04-26 | End: 2019-05-15 | Stop reason: SDUPTHER

## 2019-05-11 ENCOUNTER — LAB ENCOUNTER (OUTPATIENT)
Dept: LAB | Age: 69
End: 2019-05-11
Attending: INTERNAL MEDICINE
Payer: MEDICARE

## 2019-05-11 DIAGNOSIS — E78.00 PURE HYPERCHOLESTEROLEMIA: Primary | ICD-10-CM

## 2019-05-11 PROCEDURE — 80061 LIPID PANEL: CPT

## 2019-05-11 PROCEDURE — 80053 COMPREHEN METABOLIC PANEL: CPT

## 2019-05-11 PROCEDURE — 36415 COLL VENOUS BLD VENIPUNCTURE: CPT

## 2019-05-15 ENCOUNTER — OFFICE VISIT (OUTPATIENT)
Dept: CARDIOLOGY | Age: 69
End: 2019-05-15

## 2019-05-15 VITALS
BODY MASS INDEX: 36.7 KG/M2 | DIASTOLIC BLOOD PRESSURE: 60 MMHG | SYSTOLIC BLOOD PRESSURE: 132 MMHG | HEART RATE: 63 BPM | WEIGHT: 286 LBS | HEIGHT: 74 IN

## 2019-05-15 DIAGNOSIS — Z95.1 HX OF CABG: ICD-10-CM

## 2019-05-15 DIAGNOSIS — E78.2 HYPERLIPIDEMIA, MIXED: ICD-10-CM

## 2019-05-15 DIAGNOSIS — G47.33 OBSTRUCTIVE SLEEP APNEA SYNDROME: ICD-10-CM

## 2019-05-15 DIAGNOSIS — I25.2 OLD MI (MYOCARDIAL INFARCTION): ICD-10-CM

## 2019-05-15 DIAGNOSIS — I77.9 CAROTID ARTERY DISEASE, UNSPECIFIED LATERALITY, UNSPECIFIED TYPE (CMD): ICD-10-CM

## 2019-05-15 DIAGNOSIS — I25.10 CAD IN NATIVE ARTERY: Primary | ICD-10-CM

## 2019-05-15 DIAGNOSIS — I71.40 ABDOMINAL AORTIC ANEURYSM (AAA) WITHOUT RUPTURE (CMD): ICD-10-CM

## 2019-05-15 PROBLEM — I25.709 CORONARY ARTERY DISEASE INVOLVING CORONARY BYPASS GRAFT OF NATIVE HEART WITH ANGINA PECTORIS (CMD): Status: RESOLVED | Noted: 2019-05-15 | Resolved: 2019-05-15

## 2019-05-15 PROBLEM — I25.709 CORONARY ARTERY DISEASE INVOLVING CORONARY BYPASS GRAFT OF NATIVE HEART WITH ANGINA PECTORIS (CMD): Status: ACTIVE | Noted: 2019-05-15

## 2019-05-15 PROCEDURE — 99215 OFFICE O/P EST HI 40 MIN: CPT | Performed by: INTERNAL MEDICINE

## 2019-05-15 RX ORDER — ROSUVASTATIN CALCIUM 20 MG/1
20 TABLET, COATED ORAL DAILY
Qty: 90 TABLET | Refills: 3 | Status: CANCELLED | OUTPATIENT
Start: 2019-05-15

## 2019-05-15 RX ORDER — CLOPIDOGREL BISULFATE 75 MG/1
75 TABLET ORAL DAILY
Qty: 90 TABLET | Refills: 3 | Status: SHIPPED | OUTPATIENT
Start: 2019-05-15 | End: 2020-08-03

## 2019-05-15 RX ORDER — ROSUVASTATIN CALCIUM 40 MG/1
40 TABLET, COATED ORAL DAILY
Qty: 90 TABLET | Refills: 3 | Status: SHIPPED | OUTPATIENT
Start: 2019-05-15 | End: 2020-07-27

## 2019-05-15 RX ORDER — AMLODIPINE BESYLATE 5 MG/1
5 TABLET ORAL DAILY
Qty: 90 TABLET | Refills: 3 | Status: SHIPPED | OUTPATIENT
Start: 2019-05-15

## 2019-05-15 RX ORDER — LISINOPRIL 5 MG/1
5 TABLET ORAL DAILY
Qty: 90 TABLET | Refills: 3 | Status: SHIPPED | OUTPATIENT
Start: 2019-05-15 | End: 2019-11-20 | Stop reason: ALTCHOICE

## 2019-05-15 RX ORDER — METOPROLOL SUCCINATE 50 MG/1
50 TABLET, EXTENDED RELEASE ORAL DAILY
Qty: 90 TABLET | Refills: 3 | Status: SHIPPED | OUTPATIENT
Start: 2019-05-15

## 2019-05-16 DIAGNOSIS — E78.2 HYPERLIPIDEMIA, MIXED: ICD-10-CM

## 2019-05-17 ENCOUNTER — DOCUMENTATION ONLY (OUTPATIENT)
Dept: INTERNAL MEDICINE CLINIC | Facility: CLINIC | Age: 69
End: 2019-05-17

## 2019-05-17 RX ORDER — ROSUVASTATIN CALCIUM 40 MG/1
40 TABLET, COATED ORAL NIGHTLY
COMMUNITY
End: 2021-06-07

## 2019-07-16 RX ORDER — TAMSULOSIN HYDROCHLORIDE 0.4 MG/1
CAPSULE ORAL
Qty: 90 CAPSULE | Refills: 1 | Status: SHIPPED | OUTPATIENT
Start: 2019-07-16 | End: 2019-10-21 | Stop reason: DRUGHIGH

## 2019-07-16 RX ORDER — TAMSULOSIN HYDROCHLORIDE 0.4 MG/1
0.4 CAPSULE ORAL
COMMUNITY
Start: 2019-02-13 | End: 2019-09-25

## 2019-07-16 NOTE — TELEPHONE ENCOUNTER
Tamsulosin HCL 0.4 mg oral tab      Last OV relevant to medication: 12/18/2018    Last refill date: 12/18/2018    #/refills: #30 w/ 3 refills    When pt was asked to return for OV: None noted    Upcoming appt/reason: No future appointment    Per last OV no

## 2019-08-05 RX ORDER — GLIPIZIDE 5 MG/1
5 TABLET ORAL
Qty: 90 TABLET | Refills: 2 | Status: CANCELLED | OUTPATIENT
Start: 2019-08-05

## 2019-08-06 RX ORDER — GLIPIZIDE 5 MG/1
5 TABLET, FILM COATED, EXTENDED RELEASE ORAL DAILY
Qty: 90 TABLET | Refills: 0 | Status: SHIPPED | OUTPATIENT
Start: 2019-08-06 | End: 2019-09-25 | Stop reason: ALTCHOICE

## 2019-08-06 NOTE — TELEPHONE ENCOUNTER
Overdue for follow up appointment for DM - call to schedule    Also let him know he is overdue for diabetes eye exam - previously saw Dr. Sandi Morfin [phone]

## 2019-08-06 NOTE — TELEPHONE ENCOUNTER
LMTCB x 1   Is pt taking extended or regular release? Failed protocol     Last refill:  Entered historically Glipizide 5 mg   - given at hospital Dr Tianna Lazcano - 2/15/2018 Glipizide ER 5 mg #90 3R -Dr Mirtha Weiss:   3/13/2019 Mendy Sanchez RTC ?       LO

## 2019-09-25 ENCOUNTER — OFFICE VISIT (OUTPATIENT)
Dept: FAMILY MEDICINE CLINIC | Facility: CLINIC | Age: 69
End: 2019-09-25
Payer: MEDICARE

## 2019-09-25 ENCOUNTER — TELEPHONE (OUTPATIENT)
Dept: FAMILY MEDICINE CLINIC | Facility: CLINIC | Age: 69
End: 2019-09-25

## 2019-09-25 VITALS
TEMPERATURE: 99 F | OXYGEN SATURATION: 96 % | WEIGHT: 288.25 LBS | HEIGHT: 72.5 IN | BODY MASS INDEX: 38.62 KG/M2 | RESPIRATION RATE: 24 BRPM | HEART RATE: 72 BPM | DIASTOLIC BLOOD PRESSURE: 70 MMHG | SYSTOLIC BLOOD PRESSURE: 112 MMHG

## 2019-09-25 DIAGNOSIS — N52.9 VASCULOGENIC ERECTILE DYSFUNCTION, UNSPECIFIED VASCULOGENIC ERECTILE DYSFUNCTION TYPE: ICD-10-CM

## 2019-09-25 DIAGNOSIS — M25.551 BILATERAL HIP PAIN: ICD-10-CM

## 2019-09-25 DIAGNOSIS — E66.01 SEVERE OBESITY (BMI 35.0-39.9) WITH COMORBIDITY (HCC): ICD-10-CM

## 2019-09-25 DIAGNOSIS — Z95.1 S/P CABG (CORONARY ARTERY BYPASS GRAFT): ICD-10-CM

## 2019-09-25 DIAGNOSIS — E78.2 HYPERLIPIDEMIA, MIXED: ICD-10-CM

## 2019-09-25 DIAGNOSIS — M47.816 ARTHRITIS OF LUMBAR SPINE: ICD-10-CM

## 2019-09-25 DIAGNOSIS — K76.0 FATTY LIVER: ICD-10-CM

## 2019-09-25 DIAGNOSIS — R15.1 FECAL SMEARING: ICD-10-CM

## 2019-09-25 DIAGNOSIS — D75.1 POLYCYTHEMIA: ICD-10-CM

## 2019-09-25 DIAGNOSIS — H90.3 SENSORINEURAL HEARING LOSS (SNHL) OF BOTH EARS: ICD-10-CM

## 2019-09-25 DIAGNOSIS — M47.812 FACET ARTHRITIS OF CERVICAL REGION: ICD-10-CM

## 2019-09-25 DIAGNOSIS — E11.51 DM (DIABETES MELLITUS) TYPE II, CONTROLLED, WITH PERIPHERAL VASCULAR DISORDER (HCC): Primary | ICD-10-CM

## 2019-09-25 DIAGNOSIS — N40.1 BENIGN PROSTATIC HYPERPLASIA WITH NOCTURIA: ICD-10-CM

## 2019-09-25 DIAGNOSIS — I71.4 ABDOMINAL AORTIC ANEURYSM (AAA) WITHOUT RUPTURE (HCC): ICD-10-CM

## 2019-09-25 DIAGNOSIS — Z23 NEED FOR VACCINATION: ICD-10-CM

## 2019-09-25 DIAGNOSIS — E11.51 PERIPHERAL VASCULAR DISEASE IN DIABETES MELLITUS (HCC): ICD-10-CM

## 2019-09-25 DIAGNOSIS — I77.9 BILATERAL CAROTID ARTERY DISEASE, UNSPECIFIED TYPE (HCC): ICD-10-CM

## 2019-09-25 DIAGNOSIS — H25.092 AGE-RELATED INCIPIENT CATARACT OF LEFT EYE: ICD-10-CM

## 2019-09-25 DIAGNOSIS — M48.02 FORAMINAL STENOSIS OF CERVICAL REGION: ICD-10-CM

## 2019-09-25 DIAGNOSIS — M25.552 BILATERAL HIP PAIN: ICD-10-CM

## 2019-09-25 DIAGNOSIS — M46.96 INFLAMMATORY SPONDYLOPATHY OF LUMBAR REGION (HCC): ICD-10-CM

## 2019-09-25 DIAGNOSIS — M47.22 OSTEOARTHRITIS OF SPINE WITH RADICULOPATHY, CERVICAL REGION: ICD-10-CM

## 2019-09-25 DIAGNOSIS — Z11.59 NEED FOR HEPATITIS C SCREENING TEST: ICD-10-CM

## 2019-09-25 DIAGNOSIS — I70.203 BILATERAL ATHEROSCLEROSIS OF LEGS (HCC): ICD-10-CM

## 2019-09-25 DIAGNOSIS — I25.110 CORONARY ARTERY DISEASE INVOLVING NATIVE CORONARY ARTERY OF NATIVE HEART WITH UNSTABLE ANGINA PECTORIS (HCC): ICD-10-CM

## 2019-09-25 DIAGNOSIS — R35.1 BENIGN PROSTATIC HYPERPLASIA WITH NOCTURIA: ICD-10-CM

## 2019-09-25 DIAGNOSIS — G47.33 OSA (OBSTRUCTIVE SLEEP APNEA): ICD-10-CM

## 2019-09-25 DIAGNOSIS — M50.20 CERVICAL DISC HERNIATION: ICD-10-CM

## 2019-09-25 DIAGNOSIS — Z00.00 ENCOUNTER FOR ANNUAL HEALTH EXAMINATION: ICD-10-CM

## 2019-09-25 PROBLEM — I25.10 CAD IN NATIVE ARTERY: Status: ACTIVE | Noted: 2019-05-15

## 2019-09-25 PROBLEM — I25.10 CAD IN NATIVE ARTERY: Status: RESOLVED | Noted: 2019-05-15 | Resolved: 2019-09-25

## 2019-09-25 PROCEDURE — G0439 PPPS, SUBSEQ VISIT: HCPCS | Performed by: FAMILY MEDICINE

## 2019-09-25 PROCEDURE — 96160 PT-FOCUSED HLTH RISK ASSMT: CPT | Performed by: FAMILY MEDICINE

## 2019-09-25 PROCEDURE — 90662 IIV NO PRSV INCREASED AG IM: CPT | Performed by: FAMILY MEDICINE

## 2019-09-25 PROCEDURE — 99397 PER PM REEVAL EST PAT 65+ YR: CPT | Performed by: FAMILY MEDICINE

## 2019-09-25 PROCEDURE — G0008 ADMIN INFLUENZA VIRUS VAC: HCPCS | Performed by: FAMILY MEDICINE

## 2019-09-25 RX ORDER — SENNOSIDES 8.6 MG
1300 CAPSULE ORAL EVERY 8 HOURS PRN
Qty: 60 TABLET | Refills: 0 | Status: SHIPPED | OUTPATIENT
Start: 2019-09-25 | End: 2020-01-06

## 2019-09-25 RX ORDER — METFORMIN HYDROCHLORIDE 500 MG/1
500 TABLET, EXTENDED RELEASE ORAL DAILY
Qty: 90 TABLET | Refills: 0 | Status: SHIPPED | OUTPATIENT
Start: 2019-09-25 | End: 2019-10-22

## 2019-09-25 NOTE — TELEPHONE ENCOUNTER
MA contacted Regan Carrier Oppenheim's office in attempts to obtain recent diabetic eye exam. Consent signed form sent to location (H:[phone])  Documents revieved.

## 2019-09-25 NOTE — PROGRESS NOTES
HPI:   Maryann Mi is a 71year old male who presents for a MA (Medicare Advantage) Supervisit (Once per calendar year).     Here to establish care    CAD/atherosclerosis-2003 developed chest pain and felt short of breath underwent cardiac cath but bloc sides of his leg. He feels that the muscles are tight and ache. X-rays with Dr. Singh Bones showed mild degenerative changes previously. He was told they were musculoskeletal.  Tried Tylenol arthritis without relief.   States was previously evaluated told it w Headaches. Compliant with CPAP. Admits history to BPH and wakes up 3-4 times a night to urinate. On tamsulosin. Denies change in stream or hesitancy has not tried restricting fluids. History of ED.   Requesting referral to see urologist.     annual P follow-up appointment. He smoked tobacco in the past but quit greater than 12 months ago.   Social History    Tobacco Use      Smoking status: Former Smoker        Packs/day: 1.00        Years: 40.00        Pack years: 36        Start date: 1/27/196 involving native coronary artery with unstable angina pectoris ([address]    Wt Readings from Last 3 Encounters:  09/25/19 : 288 lb 4 oz  06/12/19 : 280 lb  03/13/19 : 281 lb     Last Cholesterol Labs:   Lab Results   Component Value Date    CHOLEST 179 05/11/2 medical history of AAA (abdominal aortic aneurysm) ([facility] [address] (12/30/2014), Carotid stenosis (7/14/2014), Cataract, left, Cervical disc herniation (5/23/2016), Coronary atherosclerosis, Diastasis recti (12/30/2014), Diverticulosis (12/30/2014), Erythrocytosis ( exertion  GI: Positive anal leakage denies abdominal pain, denies heartburn  : 3-4x nightly worse per night nocturia, no complaint of urinary incontinence  MUSCULOSKELETAL: Positive hip pain denies back pain  NEURO: denies headaches  PSYCHE: denies depre Soft, non-tender, bowel sounds active all four quadrants,  no masses, no organomegaly   Genitalia:  Declined    Rectal: Normal tone, normal prostate, no masses or tenderness   Extremities: Extremities normal, atraumatic, no cyanosis or edema   Pulses: 2+ a annual CBC    S/P CABG (coronary artery bypass graft)  Peripheral vascular disease in diabetes mellitus ([facility] [address]  Coronary artery disease involving native coronary artery of native heart with unstable angina pectoris (HCC)  -     CARDIO - INTERNAL  -     HEMO 4000mg acetaminophen/tylenol in 24 hours).   Avoid NSAIDs or ibuprofen due to Plavix and aspirin high GI bleeding risk    Inflammatory arthropathy of lumbar spine  Arthritis of lumbar spine  -     Acetaminophen ER (TYLENOL 8 HOUR ARTHRITIS PAIN) 650 MG Oral trying?: 2 - No  Has your appetite been poor?: No  How does the patient maintain a good energy level?: Stretching  How would you describe your daily physical activity?: Light  How would you describe your current health state?: Fair  How do you maintain pos 23 (Pneumovax)  Covered Once after 65 11/09/2006 Please get once after your 65th birthday    Hepatitis B for Moderate/High Risk No vaccine history found Medium/high risk factors:   End-stage renal disease   Hemophiliacs who received Factor VIII or IX kamila flowsheet data found.

## 2019-09-25 NOTE — PATIENT INSTRUCTIONS
Deep Ennis's SCREENING SCHEDULE   Tests on this list are recommended by your physician but may not be covered, or covered at this frequency, by your insurer. Please check with your insurance carrier before scheduling to verify coverage.     PREVENTATIV previous visit.  Limited to patients who meet one of the following criteria:   • Men who are 73-68 years old and have smoked more than 100 cigarettes in their lifetime   • Anyone with a family history    Colorectal Cancer Screening Covered up to Age 76 renal disease   Hemophiliacs who received Factor VIII or IX concentrates   Clients of institutions for the mentally retarded   Persons who live in the same house as a HepB virus carrier   Homosexual men   Illicit injectable drug abusers     Tetanus Toxoid-

## 2019-09-27 PROBLEM — R35.1 NOCTURIA: Status: ACTIVE | Noted: 2019-09-27

## 2019-09-27 LAB
ALBUMIN SERPL-MCNC: 4.1 G/DL
ALBUMIN/GLOB SERPL: 1.6 {RATIO}
ALP SERPL-CCNC: 50 U/L
ALT SERPL-CCNC: 41 U/L
ANION GAP SERPL CALC-SCNC: NORMAL MMOL/L
AST SERPL-CCNC: 31 U/L
BILIRUB SERPL-MCNC: 0.6 MG/DL
BUN SERPL-MCNC: 13 MG/DL
BUN/CREAT SERPL: NORMAL
CALCIUM SERPL-MCNC: 9.4 MG/DL
CHLORIDE SERPL-SCNC: 106 MMOL/L
CHOLEST SERPL-MCNC: 158 MG/DL
CHOLEST/HDLC SERPL: NORMAL {RATIO}
CO2 SERPL-SCNC: 23 MMOL/L
CREAT SERPL-MCNC: 0.99 MG/DL
EST. AVERAGE GLUCOSE BLD GHB EST-MCNC: NORMAL MG/DL
GLOBULIN SER-MCNC: 2.6 G/DL
GLUCOSE SERPL-MCNC: 120 MG/DL
HBA1C MFR BLD: 6.1 %
HBA1C MFR BLD: NORMAL % (ref 4.5–5.6)
HDLC SERPL-MCNC: 40 MG/DL
LDLC SERPL CALC-MCNC: 90 MG/DL
LENGTH OF FAST TIME PATIENT: NORMAL H
LENGTH OF FAST TIME PATIENT: NORMAL H
NONHDLC SERPL-MCNC: 118 MG/DL
POTASSIUM SERPL-SCNC: 4.7 MMOL/L
PROT SERPL-MCNC: 6.7 G/DL
SODIUM SERPL-SCNC: 139 MMOL/L
TRIGL SERPL-MCNC: 181 MG/DL
VLDLC SERPL CALC-MCNC: NORMAL MG/DL

## 2019-09-28 ENCOUNTER — HOSPITAL ENCOUNTER (OUTPATIENT)
Dept: GENERAL RADIOLOGY | Facility: HOSPITAL | Age: 69
Discharge: HOME | End: 2019-09-28
Attending: FAMILY MEDICINE
Payer: MEDICARE

## 2019-09-28 DIAGNOSIS — M25.551 BILATERAL HIP PAIN: ICD-10-CM

## 2019-09-28 DIAGNOSIS — M25.552 BILATERAL HIP PAIN: ICD-10-CM

## 2019-09-28 LAB
ALBUMIN/GLOBULIN RATIO: 1.6 (CALC) (ref 1–2.5)
ALBUMIN: 4.1 G/DL (ref 3.6–5.1)
ALKALINE PHOSPHATASE: 50 U/L (ref 40–115)
ALT: 41 U/L (ref 9–46)
APPEARANCE: CLEAR
AST: 31 U/L (ref 10–35)
BILIRUBIN, TOTAL: 0.6 MG/DL (ref 0.2–1.2)
BILIRUBIN: NEGATIVE
BUN: 13 MG/DL (ref 7–25)
CALCIUM: 9.4 MG/DL (ref 8.6–10.3)
CARBON DIOXIDE: 23 MMOL/L (ref 20–32)
CHLORIDE: 106 MMOL/L (ref 98–110)
CHOL/HDLC RATIO: 4 (CALC)
CHOLESTEROL, TOTAL: 158 MG/DL
CREATININE: 0.99 MG/DL (ref 0.7–1.25)
EGFR IF AFRICN AM: 90 ML/MIN/1.73M2
EGFR IF NONAFRICN AM: 77 ML/MIN/1.73M2
GLOBULIN: 2.6 G/DL (CALC) (ref 1.9–3.7)
GLUCOSE: 120 MG/DL (ref 65–99)
GLUCOSE: NEGATIVE
HDL CHOLESTEROL: 40 MG/DL
HEMOGLOBIN A1C: 6.1 % OF TOTAL HGB
KETONES: NEGATIVE
LDL-CHOLESTEROL: 90 MG/DL (CALC)
NITRITE: NEGATIVE
NON-HDL CHOLESTEROL: 118 MG/DL (CALC)
OCCULT BLOOD: NEGATIVE
PH: 6 (ref 5–8)
POTASSIUM: 4.7 MMOL/L (ref 3.5–5.3)
PROTEIN, TOTAL: 6.7 G/DL (ref 6.1–8.1)
PROTEIN: NEGATIVE
SIGNAL TO CUT-OFF: 0.04
SODIUM: 139 MMOL/L (ref 135–146)
SPECIFIC GRAVITY: 1.02 (ref 1–1.03)
TRIGLYCERIDES: 181 MG/DL

## 2019-09-28 PROCEDURE — 73523 X-RAY EXAM HIPS BI 5/> VIEWS: CPT | Performed by: FAMILY MEDICINE

## 2019-10-03 ENCOUNTER — CLINICAL ABSTRACT (OUTPATIENT)
Dept: CARDIOLOGY | Age: 69
End: 2019-10-03

## 2019-10-07 ENCOUNTER — OFFICE VISIT (OUTPATIENT)
Dept: SURGERY | Facility: CLINIC | Age: 69
End: 2019-10-07
Payer: MEDICARE

## 2019-10-07 VITALS
HEART RATE: 68 BPM | TEMPERATURE: 99 F | SYSTOLIC BLOOD PRESSURE: 140 MMHG | WEIGHT: 280 LBS | BODY MASS INDEX: 37.11 KG/M2 | DIASTOLIC BLOOD PRESSURE: 74 MMHG | HEIGHT: 73 IN

## 2019-10-07 DIAGNOSIS — E66.01 SEVERE OBESITY (BMI 35.0-39.9) WITH COMORBIDITY (HCC): Chronic | ICD-10-CM

## 2019-10-07 DIAGNOSIS — R15.1 FECAL SMEARING: Primary | ICD-10-CM

## 2019-10-07 DIAGNOSIS — I71.4 ABDOMINAL AORTIC ANEURYSM (AAA) WITHOUT RUPTURE (HCC): ICD-10-CM

## 2019-10-07 DIAGNOSIS — I77.9 BILATERAL CAROTID ARTERY DISEASE, UNSPECIFIED TYPE (HCC): ICD-10-CM

## 2019-10-07 DIAGNOSIS — Z95.1 S/P CABG (CORONARY ARTERY BYPASS GRAFT): ICD-10-CM

## 2019-10-07 DIAGNOSIS — I25.110 CORONARY ARTERY DISEASE INVOLVING NATIVE CORONARY ARTERY OF NATIVE HEART WITH UNSTABLE ANGINA PECTORIS (HCC): ICD-10-CM

## 2019-10-07 DIAGNOSIS — Z86.010 HISTORY OF COLON POLYPS: ICD-10-CM

## 2019-10-07 DIAGNOSIS — E11.51 PERIPHERAL VASCULAR DISEASE IN DIABETES MELLITUS (HCC): ICD-10-CM

## 2019-10-07 DIAGNOSIS — E11.51 DM (DIABETES MELLITUS) TYPE II, CONTROLLED, WITH PERIPHERAL VASCULAR DISORDER (HCC): ICD-10-CM

## 2019-10-07 PROCEDURE — 99204 OFFICE O/P NEW MOD 45 MIN: CPT | Performed by: COLON & RECTAL SURGERY

## 2019-10-07 PROCEDURE — 46600 DIAGNOSTIC ANOSCOPY SPX: CPT | Performed by: COLON & RECTAL SURGERY

## 2019-10-22 ENCOUNTER — OFFICE VISIT (OUTPATIENT)
Dept: FAMILY MEDICINE CLINIC | Facility: CLINIC | Age: 69
End: 2019-10-22
Payer: MEDICARE

## 2019-10-22 VITALS
HEIGHT: 73 IN | SYSTOLIC BLOOD PRESSURE: 130 MMHG | RESPIRATION RATE: 18 BRPM | HEART RATE: 67 BPM | DIASTOLIC BLOOD PRESSURE: 62 MMHG | TEMPERATURE: 98 F | WEIGHT: 292.19 LBS | BODY MASS INDEX: 38.73 KG/M2 | OXYGEN SATURATION: 96 %

## 2019-10-22 DIAGNOSIS — I25.110 CORONARY ARTERY DISEASE INVOLVING NATIVE CORONARY ARTERY OF NATIVE HEART WITH UNSTABLE ANGINA PECTORIS (HCC): ICD-10-CM

## 2019-10-22 DIAGNOSIS — E78.2 HYPERLIPIDEMIA, MIXED: ICD-10-CM

## 2019-10-22 DIAGNOSIS — E11.51 DM (DIABETES MELLITUS) TYPE II, CONTROLLED, WITH PERIPHERAL VASCULAR DISORDER (HCC): ICD-10-CM

## 2019-10-22 DIAGNOSIS — Z23 NEED FOR VACCINATION: Primary | ICD-10-CM

## 2019-10-22 DIAGNOSIS — D12.6 TUBULAR ADENOMA OF COLON: ICD-10-CM

## 2019-10-22 PROCEDURE — 99214 OFFICE O/P EST MOD 30 MIN: CPT | Performed by: FAMILY MEDICINE

## 2019-10-22 RX ORDER — METFORMIN HYDROCHLORIDE 500 MG/1
500 TABLET, EXTENDED RELEASE ORAL DAILY
Qty: 90 TABLET | Refills: 0 | Status: SHIPPED | OUTPATIENT
Start: 2019-10-22 | End: 2019-11-21

## 2019-10-22 RX ORDER — METOPROLOL SUCCINATE 50 MG/1
50 TABLET, EXTENDED RELEASE ORAL
Qty: 90 TABLET | Refills: 1 | Status: SHIPPED | OUTPATIENT
Start: 2019-10-22 | End: 2020-07-13

## 2019-10-22 RX ORDER — AMLODIPINE BESYLATE 5 MG/1
5 TABLET ORAL DAILY
Qty: 90 TABLET | Refills: 1 | Status: SHIPPED | OUTPATIENT
Start: 2019-10-22 | End: 2020-07-13

## 2019-10-22 NOTE — PATIENT INSTRUCTIONS
As of October 6th 2014, the Drug Enforcement Agency [facility]) is reclassifying all hydrocodone combination medications from Schedule III to Schedule II. This includes medications such as Norco, Vicodin, Lortab, Zohydro, and Vicoprofen.      What this means for numbness, and pain (neuropathy). This makes it hard to feel injuries or sore spots. Diabetes can also change blood flow.  This can make it harder for small problems, like a blister, to heal. In fact, small injuries can quickly become serious infections that irritations before they get worse. Regular checkups can also help keep track of the blood flow and feeling in your feet. You may need to have checkups more often if you have neuropathy. Wear correct footwear  Wearing correct footwear is very important.  If your healthcare provider. This is especially true if the area doesn’t seem to be healing. Date Last Reviewed: 11/1/2018  © 4265-9683 [employer]. [address] [facility], [address] [place] [address]. All rights reserved.  This information is not intende

## 2019-10-22 NOTE — PROGRESS NOTES
Patient presents with:  Lab Results    DM, HL, HTN f/u. HPI:   Dinh Luz is a 71year old male who presents for recheck of his diabetes, HTN, lipids. Patient’s FBS have been . Post prandial not checking.     Last HgbA1c was   HEMOGLOBIN A1C (%)   Michele (130.7 kg)  06/12/19 : 280 lb (127 kg)  03/13/19 : 281 lb (127.5 kg)    HEMOGLOBIN A1C (%)   Date Value   02/15/2018 5.9 (A)   05/16/2013 6.1 (A)     HEMOGLOBIN A1c (% of total Hgb)   Date Value   09/27/2019 6.1 (H)     HGBA1C (%)   Date Value   09/25/2012 total) by mouth daily. , Disp: 90 tablet, Rfl: 0  Rosuvastatin Calcium 40 MG Oral Tab, Take 40 mg by mouth nightly., Disp: , Rfl:   ACCU-CHEK FASTCLIX LANCETS Does not apply Misc, Use one lancet daily, Disp: 102 each, Rfl: 0  Blood Glucose Monitoring Suppl CAD   • PVD (peripheral vascular disease) ([facility] [address] 7/14/2014    On screening 2014 legs erum   • Sleep apnea    • Type II or unspecified type diabetes mellitus without mention of complication, not stated as uncontrolled    • Visual impairment     wears bifocals cyanosis, clubbing or edema. Vascular: TP and DP 2+ zoltan. Skin: is unremarkable with no rashes. Neuro: no focal abnormalities, and reflexes coordination and gait normal and symmetric. Psych- depression screening negative.   FOOT EXAM: Bilateral barefo muscle once for 1 dose. Local pharmacy   • metFORMIN HCl  MG Oral Tablet 24 Hr 90 tablet 0     Sig: Take 1 tablet (500 mg total) by mouth daily. • amLODIPine Besylate 5 MG Oral Tab 90 tablet 1     Sig: Take 1 tablet (5 mg total) by mouth daily.    •

## 2019-10-27 NOTE — H&P
New Patient Visit Note       Active Problems      1. Fecal smearing    2. History of colon polyps    3. DM (diabetes mellitus) type II, controlled, with peripheral vascular disorder ([facility] [address]    4. Peripheral vascular disease [facility]    5.  A uncle with colon cancer. .    The patient's past medical history includes CAD s/p CABG, PAD, Diabetes on metformin, KRISTEN, Obesity with BMI 36. The patient's past surgical history includes lap bailey. The patient does take blood thinning medications.  He HISTORY  2014    passed kidney stone via stent   • REMOVAL GALLBLADDER         Family History   Problem Relation Age of Onset   • Other (CAD[other]) Father    • Other (lung cancer[other]) Father    • Arthritis Father    • Cancer Father    • Heart Disorder wheezing. Cardiovascular: Negative for chest pain and palpitations. Gastrointestinal: Negative for abdominal distention, abdominal pain, anal bleeding, blood in stool, constipation, diarrhea, nausea, rectal pain and vomiting.    Endocrine: Negative for exam was performed. There was  decreased resting anal sphincter tone, no palpable mass and decreased squeeze strength. Anoscopy was performed. The anal canal and anorectal ring were examined circumferentially.    There was not anal fissure, anal fistula

## 2019-11-04 ENCOUNTER — OFFICE VISIT (OUTPATIENT)
Dept: SURGERY | Facility: CLINIC | Age: 69
End: 2019-11-04
Payer: MEDICARE

## 2019-11-04 VITALS
DIASTOLIC BLOOD PRESSURE: 72 MMHG | BODY MASS INDEX: 38.17 KG/M2 | HEART RATE: 75 BPM | SYSTOLIC BLOOD PRESSURE: 148 MMHG | HEIGHT: 73 IN | WEIGHT: 288 LBS | TEMPERATURE: 98 F

## 2019-11-04 DIAGNOSIS — R15.1 FECAL SMEARING: Primary | ICD-10-CM

## 2019-11-04 PROCEDURE — 99212 OFFICE O/P EST SF 10 MIN: CPT | Performed by: COLON & RECTAL SURGERY

## 2019-11-20 ENCOUNTER — OFFICE VISIT (OUTPATIENT)
Dept: CARDIOLOGY | Age: 69
End: 2019-11-20

## 2019-11-20 ENCOUNTER — TELEPHONE (OUTPATIENT)
Dept: FAMILY MEDICINE CLINIC | Facility: CLINIC | Age: 69
End: 2019-11-20

## 2019-11-20 VITALS
SYSTOLIC BLOOD PRESSURE: 110 MMHG | HEIGHT: 73 IN | BODY MASS INDEX: 37.64 KG/M2 | DIASTOLIC BLOOD PRESSURE: 56 MMHG | WEIGHT: 284 LBS | HEART RATE: 68 BPM

## 2019-11-20 DIAGNOSIS — G47.33 OBSTRUCTIVE SLEEP APNEA SYNDROME: ICD-10-CM

## 2019-11-20 DIAGNOSIS — E78.2 HYPERLIPIDEMIA, MIXED: ICD-10-CM

## 2019-11-20 DIAGNOSIS — I77.9 CAROTID ARTERY DISEASE, UNSPECIFIED LATERALITY, UNSPECIFIED TYPE (CMD): ICD-10-CM

## 2019-11-20 DIAGNOSIS — I25.10 CAD IN NATIVE ARTERY: Primary | ICD-10-CM

## 2019-11-20 DIAGNOSIS — Z95.1 HX OF CABG: ICD-10-CM

## 2019-11-20 DIAGNOSIS — R73.9 HYPERGLYCEMIA: Chronic | ICD-10-CM

## 2019-11-20 DIAGNOSIS — I71.40 ABDOMINAL AORTIC ANEURYSM (AAA) WITHOUT RUPTURE (CMD): ICD-10-CM

## 2019-11-20 DIAGNOSIS — I25.2 OLD MI (MYOCARDIAL INFARCTION): ICD-10-CM

## 2019-11-20 PROCEDURE — 99214 OFFICE O/P EST MOD 30 MIN: CPT | Performed by: INTERNAL MEDICINE

## 2019-11-20 RX ORDER — METFORMIN HYDROCHLORIDE 500 MG/1
500 TABLET, EXTENDED RELEASE ORAL
COMMUNITY

## 2019-11-20 ASSESSMENT — ENCOUNTER SYMPTOMS
SUSPICIOUS LESIONS: 0
FEVER: 0
BRUISES/BLEEDS EASILY: 0
WEIGHT LOSS: 0
HEMOPTYSIS: 0
ALLERGIC/IMMUNOLOGIC COMMENTS: NO NEW FOOD ALLERGIES
HEMATOCHEZIA: 0
COUGH: 0
WEIGHT GAIN: 0
CHILLS: 0

## 2019-11-20 ASSESSMENT — PATIENT HEALTH QUESTIONNAIRE - PHQ9
1. LITTLE INTEREST OR PLEASURE IN DOING THINGS: NOT AT ALL
2. FEELING DOWN, DEPRESSED OR HOPELESS: NOT AT ALL
SUM OF ALL RESPONSES TO PHQ9 QUESTIONS 1 AND 2: 0
1. LITTLE INTEREST OR PLEASURE IN DOING THINGS: NOT AT ALL
SUM OF ALL RESPONSES TO PHQ9 QUESTIONS 1 AND 2: 0
2. FEELING DOWN, DEPRESSED OR HOPELESS: NOT AT ALL
SUM OF ALL RESPONSES TO PHQ9 QUESTIONS 1 AND 2: 0

## 2019-11-20 NOTE — TELEPHONE ENCOUNTER
Patient called and said he needed a referral dated today for Dr. Maxime Elliott [facility]. He said when he was in last Dr. Lula Short was going to put a referral in.   He had an appointment today with the cardiologist.

## 2019-11-21 PROBLEM — R73.9 HYPERGLYCEMIA: Status: ACTIVE | Noted: 2019-11-20

## 2019-11-28 NOTE — MR AVS SNAPSHOT
After Visit Summary   5/4/2017    Mukul Rosa    MRN: [id number]           Visit Information        Provider Department Dept Phone    5/4/2017  9:00 PM Bed1  [facility] [phone]      Allergies as of 5/4/2017  Reviewed on: 4/17/2017    No K will help us do so. For more information on [employer], please visit [email or web address] no

## 2019-12-05 NOTE — PROGRESS NOTES
Office Visit Note       Active Problems      1.  Fecal smearing          Chief Complaint   Patient presents with:  Anal / Rectal Problem: Improved soilage        History of Present Illness  Jhon Herrera is a 71year old male who presents today for ongoing Past Surgical History:   Procedure Laterality Date   • CABG  2003   • CATARACT     • CHOLECYSTECTOMY     • COLONOSCOPY  2003   • COLONOSCOPY N/A 1/30/2015    Performed by Willi Buitrago MD at [facility] ENDOSCOPY   • Stephanie  12.20 apply route daily. Clopidogrel Bisulfate 75 MG Oral Tab, Take 1 tablet (75 mg total) by mouth daily. Sildenafil Citrate (VIAGRA) 100 MG Oral Tab, Take 1 tablet (100 mg total) by mouth as needed for Erectile Dysfunction.   acetaminophen (TYLENOL EXTRA STRE icterus. Neck: Normal range of motion. Cardiovascular: Normal rate and regular rhythm. Pulmonary/Chest: Breath sounds normal. No respiratory distress. He has no wheezes. Abdominal: Soft. He exhibits no distension and no mass.  There is no tenderness

## 2019-12-11 ENCOUNTER — TELEPHONE (OUTPATIENT)
Dept: CARDIOLOGY | Age: 69
End: 2019-12-11

## 2019-12-23 ENCOUNTER — HOSPITAL ENCOUNTER (OUTPATIENT)
Dept: CARDIOLOGY CLINIC | Facility: HOSPITAL | Age: 69
Discharge: HOME | End: 2019-12-23
Attending: INTERNAL MEDICINE
Payer: MEDICARE

## 2019-12-23 DIAGNOSIS — I25.10 CAD (CORONARY ARTERY DISEASE): ICD-10-CM

## 2019-12-23 DIAGNOSIS — E78.2 MIXED HYPERLIPIDEMIA: ICD-10-CM

## 2019-12-23 DIAGNOSIS — I71.4 ABDOMINAL AORTIC ANEURYSM WITHOUT RUPTURE (HCC): ICD-10-CM

## 2019-12-23 PROCEDURE — 93978 VASCULAR STUDY: CPT | Performed by: INTERNAL MEDICINE

## 2019-12-24 ENCOUNTER — TELEPHONE (OUTPATIENT)
Dept: CARDIOLOGY | Age: 69
End: 2019-12-24

## 2019-12-31 ENCOUNTER — OFFICE VISIT (OUTPATIENT)
Dept: FAMILY MEDICINE CLINIC | Facility: CLINIC | Age: 69
End: 2019-12-31
Payer: MEDICARE

## 2019-12-31 ENCOUNTER — TELEPHONE (OUTPATIENT)
Dept: FAMILY MEDICINE CLINIC | Facility: CLINIC | Age: 69
End: 2019-12-31

## 2019-12-31 VITALS
HEIGHT: 71.5 IN | OXYGEN SATURATION: 98 % | BODY MASS INDEX: 39.49 KG/M2 | SYSTOLIC BLOOD PRESSURE: 126 MMHG | DIASTOLIC BLOOD PRESSURE: 60 MMHG | HEART RATE: 71 BPM | RESPIRATION RATE: 18 BRPM | WEIGHT: 288.38 LBS | TEMPERATURE: 98 F

## 2019-12-31 DIAGNOSIS — Z01.818 PREOP EXAMINATION: Primary | ICD-10-CM

## 2019-12-31 DIAGNOSIS — E66.01 SEVERE OBESITY (BMI 35.0-39.9) WITH COMORBIDITY (HCC): Chronic | ICD-10-CM

## 2019-12-31 DIAGNOSIS — K76.0 FATTY LIVER: ICD-10-CM

## 2019-12-31 DIAGNOSIS — H90.3 SENSORINEURAL HEARING LOSS (SNHL) OF BOTH EARS: ICD-10-CM

## 2019-12-31 DIAGNOSIS — K57.90 DIVERTICULOSIS: ICD-10-CM

## 2019-12-31 DIAGNOSIS — M47.816 ARTHRITIS OF LUMBAR SPINE: ICD-10-CM

## 2019-12-31 DIAGNOSIS — I71.4 ABDOMINAL AORTIC ANEURYSM (AAA) WITHOUT RUPTURE (HCC): ICD-10-CM

## 2019-12-31 DIAGNOSIS — E11.51 PERIPHERAL VASCULAR DISEASE IN DIABETES MELLITUS (HCC): ICD-10-CM

## 2019-12-31 DIAGNOSIS — Z95.1 S/P CABG (CORONARY ARTERY BYPASS GRAFT): ICD-10-CM

## 2019-12-31 DIAGNOSIS — N40.0 BENIGN PROSTATIC HYPERPLASIA WITHOUT LOWER URINARY TRACT SYMPTOMS: ICD-10-CM

## 2019-12-31 DIAGNOSIS — M47.812 FACET ARTHRITIS OF CERVICAL REGION: ICD-10-CM

## 2019-12-31 DIAGNOSIS — G47.33 OSA (OBSTRUCTIVE SLEEP APNEA): ICD-10-CM

## 2019-12-31 DIAGNOSIS — I70.203 BILATERAL ATHEROSCLEROSIS OF LEGS (HCC): ICD-10-CM

## 2019-12-31 DIAGNOSIS — E11.51 DM (DIABETES MELLITUS) TYPE II, CONTROLLED, WITH PERIPHERAL VASCULAR DISORDER (HCC): ICD-10-CM

## 2019-12-31 DIAGNOSIS — R35.1 NOCTURIA: ICD-10-CM

## 2019-12-31 DIAGNOSIS — E78.2 HYPERLIPIDEMIA, MIXED: ICD-10-CM

## 2019-12-31 DIAGNOSIS — M48.02 FORAMINAL STENOSIS OF CERVICAL REGION: ICD-10-CM

## 2019-12-31 DIAGNOSIS — H25.092 AGE-RELATED INCIPIENT CATARACT OF LEFT EYE: ICD-10-CM

## 2019-12-31 DIAGNOSIS — I25.110 CORONARY ARTERY DISEASE INVOLVING NATIVE CORONARY ARTERY OF NATIVE HEART WITH UNSTABLE ANGINA PECTORIS (HCC): ICD-10-CM

## 2019-12-31 DIAGNOSIS — D12.6 TUBULAR ADENOMA OF COLON: ICD-10-CM

## 2019-12-31 PROBLEM — R73.9 HYPERGLYCEMIA: Status: RESOLVED | Noted: 2019-11-20 | Resolved: 2019-12-31

## 2019-12-31 PROCEDURE — 99214 OFFICE O/P EST MOD 30 MIN: CPT | Performed by: FAMILY MEDICINE

## 2019-12-31 RX ORDER — KETOROLAC TROMETHAMINE 5 MG/ML
SOLUTION OPHTHALMIC
COMMUNITY
Start: 2019-11-15 | End: 2020-02-03 | Stop reason: ALTCHOICE

## 2019-12-31 RX ORDER — PREDNISOLONE ACETATE 10 MG/ML
SUSPENSION/ DROPS OPHTHALMIC
COMMUNITY
Start: 2019-11-15 | End: 2020-02-03 | Stop reason: ALTCHOICE

## 2019-12-31 RX ORDER — OFLOXACIN 3 MG/ML
SOLUTION/ DROPS OPHTHALMIC
COMMUNITY
Start: 2019-11-15 | End: 2020-02-03 | Stop reason: ALTCHOICE

## 2019-12-31 NOTE — PROGRESS NOTES
Sari Moore is a 71year old male who presents for a pre-operative physical exam.   Sari Moore is scheduled for a left eye cataract surgery at [facility] for surgery on 1/9/2020 performed by Dr Keith Ramirez.  Indication: Blurry vision    HPI related t No     Past Medical History:   Diagnosis Date   • AAA (abdominal aortic aneurysm) ([facility] [address] 12/30/2014   • Arthritis    • Back pain    • Bleeding nose    • Carotid stenosis 7/14/2014    mild   • Cataract, left     already had R eye update 2016   • Cervical dis mL 0   • psyllium 28 % Oral Powd Pack Take 1 packet by mouth 2 (two) times daily. • amLODIPine Besylate 5 MG Oral Tab Take 1 tablet (5 mg total) by mouth daily.  90 tablet 1   • Metoprolol Succinate ER 50 MG Oral Tablet 24 Hr Take 1 tablet (50 mg total) diarrhea; no rectal bleeding; no heartburn  GENITAL/: no dysuria, urgency or frequency; no epididymal or testicular pain; no penile discharge; no hernias; no erectile dysfunction; no nocturia  MUSCULOSKELETAL: no joint complaints upper or lower extremiti eGFR  12/31/2019 92    • BUN/CREATININE RATIO 70/80/4678 NOT APPLICABLE    • SODIUM 24/72/1994 135    • POTASSIUM 12/31/2019 4.5    • CHLORIDE 12/31/2019 104    • CARBON DIOXIDE 12/31/2019 23    • CALCIUM 12/31/2019 9.8    • PROTEIN, TOTAL urinary tract symptoms  Diverticulosis  Facet arthritis of cervical region (hcc)  Fatty liver  Foraminal stenosis of cervical region  Hyperlipidemia, mixed  Nocturia  Marino (obstructive sleep apnea)  Peripheral vascular disease in diabetes mellitus (hcc)  Se

## 2020-01-01 LAB
ABSOLUTE BASOPHILS: 33 CELLS/UL (ref 0–200)
ABSOLUTE EOSINOPHILS: 191 CELLS/UL (ref 15–500)
ABSOLUTE LYMPHOCYTES: 2781 CELLS/UL (ref 850–3900)
ABSOLUTE MONOCYTES: 755 CELLS/UL (ref 200–950)
ABSOLUTE NEUTROPHILS: 4540 CELLS/UL (ref 1500–7800)
ALBUMIN/GLOBULIN RATIO: 1.7 (CALC) (ref 1–2.5)
ALBUMIN: 4.3 G/DL (ref 3.6–5.1)
ALKALINE PHOSPHATASE: 48 U/L (ref 40–115)
ALT: 50 U/L (ref 9–46)
AST: 36 U/L (ref 10–35)
BASOPHILS: 0.4 %
BILIRUBIN, TOTAL: 0.6 MG/DL (ref 0.2–1.2)
BUN: 15 MG/DL (ref 7–25)
CALCIUM: 9.8 MG/DL (ref 8.6–10.3)
CARBON DIOXIDE: 23 MMOL/L (ref 20–32)
CHLORIDE: 104 MMOL/L (ref 98–110)
CHOL/HDLC RATIO: 3.5 (CALC)
CHOLESTEROL, TOTAL: 156 MG/DL
CREATININE, RANDOM URINE: 165 MG/DL (ref 20–320)
CREATININE: 0.97 MG/DL (ref 0.7–1.25)
EGFR IF AFRICN AM: 92 ML/MIN/1.73M2
EGFR IF NONAFRICN AM: 79 ML/MIN/1.73M2
EOSINOPHILS: 2.3 %
GLOBULIN: 2.5 G/DL (CALC) (ref 1.9–3.7)
GLUCOSE: 112 MG/DL (ref 65–99)
HDL CHOLESTEROL: 44 MG/DL
HEMATOCRIT: 45.8 % (ref 38.5–50)
HEMOGLOBIN A1C: 6.3 % OF TOTAL HGB
HEMOGLOBIN: 16.3 G/DL (ref 13.2–17.1)
LDL-CHOLESTEROL: 83 MG/DL (CALC)
LYMPHOCYTES: 33.5 %
MCH: 31.7 PG (ref 27–33)
MCHC: 35.6 G/DL (ref 32–36)
MCV: 88.9 FL (ref 80–100)
MICROALBUMIN/CREATININE RATIO, RANDOM URINE: 5 MCG/MG CREAT
MICROALBUMIN: 0.9 MG/DL
MONOCYTES: 9.1 %
MPV: 9.9 FL (ref 7.5–12.5)
NEUTROPHILS: 54.7 %
NON-HDL CHOLESTEROL: 112 MG/DL (CALC)
PLATELET COUNT: 254 THOUSAND/UL (ref 140–400)
POTASSIUM: 4.5 MMOL/L (ref 3.5–5.3)
PROTEIN, TOTAL: 6.8 G/DL (ref 6.1–8.1)
PSA, TOTAL: 1.1 NG/ML
RDW: 12.8 % (ref 11–15)
RED BLOOD CELL COUNT: 5.15 MILLION/UL (ref 4.2–5.8)
SODIUM: 135 MMOL/L (ref 135–146)
TRIGLYCERIDES: 192 MG/DL
WHITE BLOOD CELL COUNT: 8.3 THOUSAND/UL (ref 3.8–10.8)

## 2020-01-02 ENCOUNTER — TELEPHONE (OUTPATIENT)
Dept: CARDIOLOGY | Age: 70
End: 2020-01-02

## 2020-01-02 NOTE — TELEPHONE ENCOUNTER
LMOM with Rayna Joshua to return call to the office. Provided pt office phone [phone] along with office hours given.     Spoke with Rayna Joshua, he will contact Dr Keith Ramirez to ensure cardiology get request for plavix and asa stop prior to surgery on 1/9/2020

## 2020-01-02 NOTE — TELEPHONE ENCOUNTER
Spoke with Kayce at [employer]. They have not received anything from the eye doctor.  They will contact the eye doctor

## 2020-01-03 NOTE — TELEPHONE ENCOUNTER
Received call from Dr. Felipe Astorga office. Spoke with Eric Puente RN. Per Dr. Felipe Astorga note \"Patient is at acceptable risk, no additional testing needed. Cataract surgery is avascular, so does not to hold Plavix or ASA.  \" They will fax this information to

## 2020-01-03 NOTE — TELEPHONE ENCOUNTER
Patient calling back to confirm what is needed. Notified to contact Cardiology to confirm if okay to stop Plavix and Aspirin, and for how long prior to surgery.

## 2020-01-06 ENCOUNTER — TELEPHONE (OUTPATIENT)
Dept: FAMILY MEDICINE CLINIC | Facility: CLINIC | Age: 70
End: 2020-01-06

## 2020-01-06 NOTE — TELEPHONE ENCOUNTER
Please fax addended H&P to Dr. Suzi Kim cleared for surgery. See patient lab results note, call patient per result lab note.

## 2020-01-17 ENCOUNTER — HOSPITAL ENCOUNTER (OUTPATIENT)
Facility: HOSPITAL | Age: 70
Setting detail: HOSPITAL OUTPATIENT SURGERY
Discharge: HOME | End: 2020-01-17
Attending: INTERNAL MEDICINE | Admitting: INTERNAL MEDICINE
Payer: MEDICARE

## 2020-01-17 ENCOUNTER — ANESTHESIA (OUTPATIENT)
Dept: ENDOSCOPY | Facility: HOSPITAL | Age: 70
End: 2020-01-17
Payer: MEDICARE

## 2020-01-17 ENCOUNTER — ANESTHESIA EVENT (OUTPATIENT)
Dept: ENDOSCOPY | Facility: HOSPITAL | Age: 70
End: 2020-01-17
Payer: MEDICARE

## 2020-01-17 VITALS
TEMPERATURE: 98 F | BODY MASS INDEX: 36.57 KG/M2 | OXYGEN SATURATION: 93 % | SYSTOLIC BLOOD PRESSURE: 116 MMHG | WEIGHT: 285 LBS | RESPIRATION RATE: 20 BRPM | DIASTOLIC BLOOD PRESSURE: 64 MMHG | HEIGHT: 74 IN | HEART RATE: 63 BPM

## 2020-01-17 DIAGNOSIS — K21.9 GASTROESOPHAGEAL REFLUX DISEASE, ESOPHAGITIS PRESENCE NOT SPECIFIED: ICD-10-CM

## 2020-01-17 DIAGNOSIS — Z86.010 PERSONAL HISTORY OF COLONIC POLYPS: ICD-10-CM

## 2020-01-17 LAB — GLUCOSE BLD-MCNC: 108 MG/DL (ref 70–99)

## 2020-01-17 PROCEDURE — 88365 INSITU HYBRIDIZATION (FISH): CPT | Performed by: INTERNAL MEDICINE

## 2020-01-17 PROCEDURE — 82962 GLUCOSE BLOOD TEST: CPT

## 2020-01-17 PROCEDURE — 88342 IMHCHEM/IMCYTCHM 1ST ANTB: CPT | Performed by: INTERNAL MEDICINE

## 2020-01-17 PROCEDURE — 88305 TISSUE EXAM BY PATHOLOGIST: CPT | Performed by: INTERNAL MEDICINE

## 2020-01-17 PROCEDURE — 0DBK8ZX EXCISION OF ASCENDING COLON, VIA NATURAL OR ARTIFICIAL OPENING ENDOSCOPIC, DIAGNOSTIC: ICD-10-PCS | Performed by: INTERNAL MEDICINE

## 2020-01-17 PROCEDURE — 0DB68ZX EXCISION OF STOMACH, VIA NATURAL OR ARTIFICIAL OPENING ENDOSCOPIC, DIAGNOSTIC: ICD-10-PCS | Performed by: INTERNAL MEDICINE

## 2020-01-17 PROCEDURE — 0DB48ZX EXCISION OF ESOPHAGOGASTRIC JUNCTION, VIA NATURAL OR ARTIFICIAL OPENING ENDOSCOPIC, DIAGNOSTIC: ICD-10-PCS | Performed by: INTERNAL MEDICINE

## 2020-01-17 RX ORDER — DEXTROSE MONOHYDRATE 25 G/50ML
50 INJECTION, SOLUTION INTRAVENOUS
Status: DISCONTINUED | OUTPATIENT
Start: 2020-01-17 | End: 2020-01-17

## 2020-01-17 RX ORDER — LIDOCAINE HYDROCHLORIDE 10 MG/ML
INJECTION, SOLUTION EPIDURAL; INFILTRATION; INTRACAUDAL; PERINEURAL AS NEEDED
Status: DISCONTINUED | OUTPATIENT
Start: 2020-01-17 | End: 2020-01-17 | Stop reason: SURG

## 2020-01-17 RX ORDER — SODIUM CHLORIDE, SODIUM LACTATE, POTASSIUM CHLORIDE, CALCIUM CHLORIDE 600; 310; 30; 20 MG/100ML; MG/100ML; MG/100ML; MG/100ML
INJECTION, SOLUTION INTRAVENOUS CONTINUOUS
Status: DISCONTINUED | OUTPATIENT
Start: 2020-01-17 | End: 2020-01-17

## 2020-01-17 RX ORDER — NALOXONE HYDROCHLORIDE 0.4 MG/ML
80 INJECTION, SOLUTION INTRAMUSCULAR; INTRAVENOUS; SUBCUTANEOUS AS NEEDED
Status: DISCONTINUED | OUTPATIENT
Start: 2020-01-17 | End: 2020-01-17

## 2020-01-17 RX ADMIN — LIDOCAINE HYDROCHLORIDE 50 MG: 10 INJECTION, SOLUTION EPIDURAL; INFILTRATION; INTRACAUDAL; PERINEURAL at 07:03:00

## 2020-01-17 NOTE — ANESTHESIA POSTPROCEDURE EVALUATION
3 [facility] Patient Status:  Hospital Outpatient Surgery   Age/Gender 71year old male MRN [id number]   Location [facility]. Attending Faustina Mcdonald, [facility] Day # 0 PCP Dante Mercedes DO       Anesthesia Post-op Note

## 2020-01-17 NOTE — OPERATIVE REPORT
Maryse Fowler Patient Status:  Hospital Outpatient Surgery    1950 MRN [id number]   [facility] ENDOSCOPY Attending Meeta Novoa DO   Hosp Day # 0 PCP Carolina Choi DO       PREOPERATIVE DIAGNOSIS/INDICATION: GERD, Histor Esophagus. Biopsies taken. RECOMMENDATIONS:    1. Follow up pathology results. 2. Continue pantoprazole 40 mg daily indefinitely given asa/plavix use.        Nusrat Gomez  Gastroenterology/Advanced Endoscopy  [facility] Gastroenterology, [facility].

## 2020-01-17 NOTE — H&P
History & Physical Examination    Patient Name: Sari Moore  MRN: [id number]  CSN: [id number]  YOB: 1950    Diagnosis: History of colon polyps    Present Illness: 72 y/o M history as above presents for Colonoscopy.     ketorolac tromethamine 102 each, Rfl: 0, Taking  Blood Glucose Monitoring Suppl (ACCU-CHEK JAQUELINE CONNECT) w/Device Does not apply Kit, 1 kit by Does not apply route daily. , Disp: 1 kit, Rfl: 0, Taking      lactated ringers infusion, , Intravenous, Continuous  glucose (DEX4) oral Past Surgical History:   Procedure Laterality Date   • ANGIOGRAM     • CABG  2003    X 4 vessels   • CATARACT  2018    Right eye-Dr. Sandi Morfin   • CHOLECYSTECTOMY  2003   • COLONOSCOPY  2003   • COLONOSCOPY N/A 1/30/2015    Performed by Rafa Perrin

## 2020-01-17 NOTE — OPERATIVE REPORT
Bobby Beltran Patient Status:  Hospital Outpatient Surgery    1950 MRN [id number]   [facility] ENDOSCOPY Attending Tam Herrera DO   Hosp Day # 0 PCP René Mendoza DO       PREOPERATIVE DIAGNOSIS/INDICATION: History of Col The majority of the colonic mucosa was normal. Two sessile polyps ranging 3 mm to 5 mm in the ascending colon s/p cold snare polypectomy. Multiple biopsies of the affected areas were taken. Left sided diverticulosis. Internal hemorrhoids.    IMPRESSION:   1

## 2020-01-17 NOTE — ANESTHESIA PREPROCEDURE EVALUATION
PRE-OP EVALUATION    Patient Name: Cherie Wagner    Pre-op Diagnosis: Personal history of colonic polyps [Z86.010]  Gastroesophageal reflux disease, esophagitis presence not specified [K21.9]    Procedure(s):  ESOPHAGOGASTRODUODENOSCOPY and colonoscopy 0  acetaminophen (TYLENOL EXTRA STRENGTH) 500 MG Oral Tab, Take 500 mg by mouth every 6 (six) hours as needed for Pain., Disp: , Rfl:   lisinopril (PRINIVIL,ZESTRIL) 5 MG Oral Tab, Take 5 mg by mouth daily. , Disp: , Rfl:   Aspirin 325 MG Oral Tab, Take 325 1 or 2      Binge frequency: Never      Drug use: No     Available pre-op labs reviewed.   Lab Results   Component Value Date    WBC 8.3 12/31/2019    RBC 5.15 12/31/2019    HGB 16.3 12/31/2019    HCT 45.8 12/31/2019    MCV 88.9 12/31/2019    MCH 31.7 12/31

## 2020-01-21 ENCOUNTER — TELEPHONE (OUTPATIENT)
Dept: CARDIOLOGY | Age: 70
End: 2020-01-21

## 2020-01-22 ENCOUNTER — TELEPHONE (OUTPATIENT)
Dept: CARDIOLOGY | Age: 70
End: 2020-01-22

## 2020-02-03 ENCOUNTER — TELEPHONE (OUTPATIENT)
Dept: FAMILY MEDICINE CLINIC | Facility: CLINIC | Age: 70
End: 2020-02-03

## 2020-02-03 ENCOUNTER — OFFICE VISIT (OUTPATIENT)
Dept: FAMILY MEDICINE CLINIC | Facility: CLINIC | Age: 70
End: 2020-02-03
Payer: MEDICARE

## 2020-02-03 VITALS
HEART RATE: 64 BPM | TEMPERATURE: 98 F | HEIGHT: 74 IN | WEIGHT: 289.38 LBS | RESPIRATION RATE: 18 BRPM | SYSTOLIC BLOOD PRESSURE: 130 MMHG | OXYGEN SATURATION: 93 % | BODY MASS INDEX: 37.14 KG/M2 | DIASTOLIC BLOOD PRESSURE: 72 MMHG

## 2020-02-03 DIAGNOSIS — N40.0 BENIGN PROSTATIC HYPERPLASIA WITHOUT LOWER URINARY TRACT SYMPTOMS: ICD-10-CM

## 2020-02-03 DIAGNOSIS — M47.816 ARTHRITIS OF LUMBAR SPINE: ICD-10-CM

## 2020-02-03 DIAGNOSIS — Z00.00 ENCOUNTER FOR ANNUAL HEALTH EXAMINATION: Primary | ICD-10-CM

## 2020-02-03 DIAGNOSIS — G47.33 OSA (OBSTRUCTIVE SLEEP APNEA): ICD-10-CM

## 2020-02-03 DIAGNOSIS — H90.3 SENSORINEURAL HEARING LOSS (SNHL) OF BOTH EARS: ICD-10-CM

## 2020-02-03 DIAGNOSIS — D75.1 POLYCYTHEMIA: ICD-10-CM

## 2020-02-03 DIAGNOSIS — E11.51 PERIPHERAL VASCULAR DISEASE IN DIABETES MELLITUS (HCC): ICD-10-CM

## 2020-02-03 DIAGNOSIS — D12.6 TUBULAR ADENOMA OF COLON: ICD-10-CM

## 2020-02-03 DIAGNOSIS — K76.0 FATTY LIVER: ICD-10-CM

## 2020-02-03 DIAGNOSIS — K57.90 DIVERTICULOSIS: ICD-10-CM

## 2020-02-03 DIAGNOSIS — M25.551 BILATERAL HIP PAIN: ICD-10-CM

## 2020-02-03 DIAGNOSIS — I70.203 BILATERAL ATHEROSCLEROSIS OF LEGS (HCC): ICD-10-CM

## 2020-02-03 DIAGNOSIS — Z23 NEED FOR TDAP VACCINATION: ICD-10-CM

## 2020-02-03 DIAGNOSIS — K22.70 BARRETT'S ESOPHAGUS DETERMINED BY ENDOSCOPY: ICD-10-CM

## 2020-02-03 DIAGNOSIS — M47.812 FACET ARTHRITIS OF CERVICAL REGION: ICD-10-CM

## 2020-02-03 DIAGNOSIS — M25.552 BILATERAL HIP PAIN: ICD-10-CM

## 2020-02-03 DIAGNOSIS — E11.51 DM (DIABETES MELLITUS) TYPE II, CONTROLLED, WITH PERIPHERAL VASCULAR DISORDER (HCC): ICD-10-CM

## 2020-02-03 DIAGNOSIS — M50.20 CERVICAL DISC HERNIATION: ICD-10-CM

## 2020-02-03 DIAGNOSIS — M48.02 FORAMINAL STENOSIS OF CERVICAL REGION: ICD-10-CM

## 2020-02-03 DIAGNOSIS — I25.110 CORONARY ARTERY DISEASE INVOLVING NATIVE CORONARY ARTERY OF NATIVE HEART WITH UNSTABLE ANGINA PECTORIS (HCC): ICD-10-CM

## 2020-02-03 DIAGNOSIS — M47.22 OSTEOARTHRITIS OF SPINE WITH RADICULOPATHY, CERVICAL REGION: ICD-10-CM

## 2020-02-03 DIAGNOSIS — I77.9 BILATERAL CAROTID ARTERY DISEASE, UNSPECIFIED TYPE (HCC): ICD-10-CM

## 2020-02-03 DIAGNOSIS — E78.2 HYPERLIPIDEMIA, MIXED: ICD-10-CM

## 2020-02-03 DIAGNOSIS — N52.9 VASCULOGENIC ERECTILE DYSFUNCTION, UNSPECIFIED VASCULOGENIC ERECTILE DYSFUNCTION TYPE: ICD-10-CM

## 2020-02-03 DIAGNOSIS — K52.3 COLITIS, INDETERMINATE: ICD-10-CM

## 2020-02-03 DIAGNOSIS — E66.01 SEVERE OBESITY (BMI 35.0-39.9) WITH COMORBIDITY (HCC): ICD-10-CM

## 2020-02-03 DIAGNOSIS — I71.4 ABDOMINAL AORTIC ANEURYSM (AAA) WITHOUT RUPTURE (HCC): ICD-10-CM

## 2020-02-03 DIAGNOSIS — Z95.1 S/P CABG (CORONARY ARTERY BYPASS GRAFT): ICD-10-CM

## 2020-02-03 PROBLEM — H25.092 AGE-RELATED INCIPIENT CATARACT OF LEFT EYE: Status: RESOLVED | Noted: 2019-09-25 | Resolved: 2020-02-03

## 2020-02-03 PROBLEM — R35.1 NOCTURIA: Status: RESOLVED | Noted: 2019-09-27 | Resolved: 2020-02-03

## 2020-02-03 PROCEDURE — G0439 PPPS, SUBSEQ VISIT: HCPCS | Performed by: FAMILY MEDICINE

## 2020-02-03 PROCEDURE — 99397 PER PM REEVAL EST PAT 65+ YR: CPT | Performed by: FAMILY MEDICINE

## 2020-02-03 PROCEDURE — 96160 PT-FOCUSED HLTH RISK ASSMT: CPT | Performed by: FAMILY MEDICINE

## 2020-02-03 NOTE — PATIENT INSTRUCTIONS
As of October 6th 2014, the Drug Enforcement Agency [facility]) is reclassifying all hydrocodone combination medications from Schedule III to Schedule II. This includes medications such as Norco, Vicodin, Lortab, Zohydro, and Vicoprofen.      What this means for not be covered, or covered at this frequency, by your insurer. Please check with your insurance carrier before scheduling to verify coverage.     PREVENTATIVE SERVICES  INDICATIONS AND SCHEDULE Internal Lab or Procedure External Lab or Procedure   Diabetes more often if abnormal Colonoscopy due on 01/17/2021 Update Health Maintenance if applicable    Flex Sigmoidoscopy Screen  Covered every 5 years No results found for this or any previous visit. No flowsheet data found.      Fecal Occult Blood   Covered Ramandeep injectable drug abusers     Tetanus Toxoid- Only covered with a cut with metal- TD and TDaP Not covered by Medicare Part B) No orders found for this or any previous visit.  This may be covered with your prescription benefits, but Medicare does not cover unl

## 2020-02-03 NOTE — PROGRESS NOTES
HPI:   Shelbi Saldana is a 71year old male who presents for a MA (Medicare Advantage) Supervisit (Once per calendar year). Completed left eye cataract surgery.  DUE dm EXAM 3/6/2020-Dr.Oppenheim    CAD/atherosclerosis-2003 developed chest pain and felt Denying  numbness in his feet      History of hemorrhoids. annual leakage resolved and colonoscopy and EGd with Dr. Ana Cristina Rowe omn 1/17/2020 showed patches of erythema with mild granularity and erosions throughout the colon.-50% of the rectum was involved.   Jamil Sánchez his left shoulder unchanged. Previously managed by Dr. Pa Colvin. He has had prior MRIs and x-rays of the C-spine and patient was told nothing could be repaired. He has been offered pain clinic and is still declining.   This started between 6-11 years ago an kg)  19 : 288 lb (130.6 kg)    Fall/Risk Assessment   He has been screened for Falls and is low risk: Fall/Risk Scorin    Cognitive Assessment   He had a completely normal cognitive assessment- see flowsheet entries    Functional Ability/Status Team:  Arash Merino DO as PCP - General (Family Medicine)  Naomi Zelaya MD (Cardiovascular Diseases)  Fang Cowart MD (GASTROENTEROLOGY)  Stephanie Stone MD (Darcella Foots)  Jodi Ramirez MD (Veatrice Pride)  Crawford Dance, MD HGB 16.3 12/31/2019     12/31/2019        ALLERGIES:   He has No Known Allergies.     CURRENT MEDICATIONS:   metFORMIN HCl  MG Oral Tablet 24 Hr, Take 500 mg by mouth daily with breakfast.    tamsulosin HCl 0.4 MG Oral Cap, 0.4 mg 2 (two) ti (peripheral vascular disease) ([facility] (7/14/2014), Shortness of breath, Sleep apnea, Type II or unspecified type diabetes mellitus without mention of complication, not stated as uncontrolled, Visual impairment, and Wears glasses.     He  has a past surgical h kg/m²   Estimated body mass index is 37.16 kg/m² as calculated from the following:    Height as of this encounter: 74\". Weight as of this encounter: 289 lb 6.4 oz (131.3 kg).     Medicare Hearing Assessment  (Required for AWV/SWV)    Finger Rub-symmetri History   Administered Date(s) Administered   • FLU VACC High Dose 65 YRS & Older PRSV Free (49349) 09/25/2019   • FLUAD High Dose 65 yr and older (28177) 12/18/2018   • FLUZONE 3 Yrs+ Quad Prsv Free 0.5 ml (52741) 10/11/2017   • Fluvirin, 3 Years & >, Im artery of native heart with unstable angina pectoris ([address]  -     CARDIO - INTERNAL-consider follow-up sooner to discuss PVD of the groin?  -     HEMOGLOBIN A1C  -     LIPID PANEL  -     LIPID PANEL repeat in 6 months  Asymptomatic-continue metoprolol, Karthikeyan hours). Avoid NSAIDs or ibuprofen due to Plavix and aspirin high GI bleeding risk      Arthritis of lumbar spine  -     Acetaminophen ER (TYLENOL 8 HOUR ARTHRITIS PAIN) 650 MG Oral Tab CR;  Take 2 tablets (1,300 mg total) by mouth every 8 (eight) hours as dietary  TSH-[facility] ordered    Need for Tdap vaccination  - Tetanus-Diphth-Acell Pertussis 5-2-15.5 LF-MCG/0.5 Intramuscular Suspension; Inject 0.5 mL into the muscle once for 1 dose.   Dispense: 0.5 mL; Refill: 0        Diet assessment: fair     PLAN:  The flowsheet data found.     Glaucoma Screening      Ophthalmology Visit Annually: Diabetics, FHx Glaucoma, AA>50, > 65 Data entered on: 9/25/2019   Last Dilated Eye Exam 3/6/2019       Prostate Cancer Screening      PSA  Annually PSA due on 12/31/2021 Value   12/31/2019 6.3 (H)       No flowsheet data found.     Creat/alb ratio  Annually Malb/Cre Calc (ug/mg)   Date Value   12/18/2018 3.9     MALB/CRE CALC (ug/mg)   Date Value   09/25/2012 12.9        LDL  Annually LDL-CHOLESTEROL (mg/dL (calc))   Date V

## 2020-02-04 NOTE — TELEPHONE ENCOUNTER
Patient reports they did not recommended anything    Reports he does have an apt coming up with him 2/24/2020 and he will discuss the medication at that visit.

## 2020-02-04 NOTE — TELEPHONE ENCOUNTER
Martinez's esophagus determined by endoscopy  Repeat endoscopy in 3 years/1/2023  Recommended heartburn diet and avoiding foods that increase acidity in stomach  Per EGD/colon biopsy lab result, Dr. Milady Sal discussed taking acid controlling medication but

## 2020-02-11 ENCOUNTER — TELEPHONE (OUTPATIENT)
Dept: FAMILY MEDICINE CLINIC | Facility: CLINIC | Age: 70
End: 2020-02-11

## 2020-02-11 DIAGNOSIS — E11.51 PERIPHERAL VASCULAR DISEASE IN DIABETES MELLITUS (HCC): ICD-10-CM

## 2020-02-11 DIAGNOSIS — I70.203 BILATERAL ATHEROSCLEROSIS OF LEGS (HCC): ICD-10-CM

## 2020-02-11 DIAGNOSIS — M51.36 LUMBAR DEGENERATIVE DISC DISEASE: Primary | ICD-10-CM

## 2020-02-11 PROBLEM — M51.369 LUMBAR DEGENERATIVE DISC DISEASE: Status: ACTIVE | Noted: 2020-02-11

## 2020-02-11 NOTE — TELEPHONE ENCOUNTER
Per patient: Dr Keila Shepherd- ortho  is rcommending he see a vascular doctor and a spinal doctor.  OV 2/11/2020- note incomplete  xrays done today

## 2020-02-13 NOTE — TELEPHONE ENCOUNTER
Referrals ordered. Please inform. Dr. Loc Ames  Vascular surgery    [phone]      Dr. Dasia Coyne M.D.   Spinal Surgery    [address] [facility], [address]  [place], [address]    Phone [phone]  Fax [phone]

## 2020-02-13 NOTE — TELEPHONE ENCOUNTER
patient notified of referrals.  PVU  Information sent via [employer] as well  Referral faxed to Dr Radha Hernandez rec'd

## 2020-02-27 RX ORDER — BLOOD SUGAR DIAGNOSTIC
STRIP MISCELLANEOUS
Qty: 100 STRIP | Refills: 11 | Status: SHIPPED | OUTPATIENT
Start: 2020-02-27 | End: 2021-03-05 | Stop reason: ALTCHOICE

## 2020-02-27 RX ORDER — LANCETS
1 EACH MISCELLANEOUS 2 TIMES DAILY
Qty: 1 BOX | Refills: 11 | Status: SHIPPED | OUTPATIENT
Start: 2020-02-27 | End: 2021-10-12

## 2020-02-27 RX ORDER — BLOOD-GLUCOSE METER
EACH MISCELLANEOUS
Qty: 1 KIT | Refills: 0 | Status: SHIPPED | OUTPATIENT
Start: 2020-02-27 | End: 2021-03-05 | Stop reason: ALTCHOICE

## 2020-02-27 NOTE — TELEPHONE ENCOUNTER
Received fax from [employer] for diabetic testing supplies. Dr. Prosper Styles, how often is patient testing? Orders pended.

## 2020-03-04 DIAGNOSIS — I73.9 CLAUDICATION (CMD): Primary | ICD-10-CM

## 2020-03-10 ENCOUNTER — OFFICE VISIT (OUTPATIENT)
Dept: PHYSICAL THERAPY | Facility: HOSPITAL | Age: 70
End: 2020-03-10
Attending: PHYSICIAN ASSISTANT
Payer: MEDICARE

## 2020-03-10 ENCOUNTER — TELEPHONE (OUTPATIENT)
Dept: FAMILY MEDICINE CLINIC | Facility: CLINIC | Age: 70
End: 2020-03-10

## 2020-03-10 DIAGNOSIS — M54.50 LUMBAR SPINE PAIN: ICD-10-CM

## 2020-03-10 PROCEDURE — 97110 THERAPEUTIC EXERCISES: CPT

## 2020-03-10 PROCEDURE — 97163 PT EVAL HIGH COMPLEX 45 MIN: CPT

## 2020-03-10 NOTE — TELEPHONE ENCOUNTER
Nae with Dr. Evan Alarcon is calling wanting a referral for patient for an ultrasound. Please call Barbie Balderas back at [phone].

## 2020-03-10 NOTE — PROGRESS NOTES
SPINE EVALUATION:   Referring Physician: Mr. Arevalo Isela  Diagnosis: Lumbar spine pain     Date of Service: 3/10/2020     PATIENT SUMMARY   Sari Moore is a 71year old male who presents to therapy today with complaints of B pain B LE to knees with walking exertion   • Sleep apnea    • Type II or unspecified type diabetes mellitus without mention of complication, not stated as uncontrolled    • Visual impairment     wears glasses   • Wears glasses       Pt denies diplopia, dysarthria, dysphasia, dizziness, d 5/5  PF (S1): R 5/5; L 5/5    Transverse abdominis 1/5     Flexibility:   LE   Hip Flexor: Rmod, L mod  Hamstrings: R -20; L -20 90/90  Piriformis: R mod; L mod  Quads: R mod; L mod  Gastroc-soleus: R wfl; L wfl     Special tests:   Sit <>stand 30 sec 8 re paraspinal mm tension to tolerate walking >/=10 minutes for work and home activities   · Pt will demonstrate improved core strength to be able to perform transferring with </= 2/10 pain   · Pt will be independent and compliant with comprehensive HEP to [facility]

## 2020-03-10 NOTE — TELEPHONE ENCOUNTER
Nae Mahmood is requesting the PCP place referral for the testing.    Reference number [id number]  Phone number [phone]  Please call for referral for testing by Dr Rohan Flores extremity bilateral US to be completed [facility]

## 2020-03-11 NOTE — TELEPHONE ENCOUNTER
Call placed to [facility] [employer].  [id number] through 03/10/2021    Provider Dr. Cinthia Pickens [phone]   # visits 3   Exp 03/10/2021    CPT 33528 Eval and Treat  ICD I73.9

## 2020-03-12 ENCOUNTER — OFFICE VISIT (OUTPATIENT)
Dept: PHYSICAL THERAPY | Facility: HOSPITAL | Age: 70
End: 2020-03-12
Attending: PHYSICIAN ASSISTANT
Payer: MEDICARE

## 2020-03-12 DIAGNOSIS — M54.50 LUMBAR SPINE PAIN: ICD-10-CM

## 2020-03-12 PROCEDURE — 97110 THERAPEUTIC EXERCISES: CPT

## 2020-03-12 PROCEDURE — 97112 NEUROMUSCULAR REEDUCATION: CPT

## 2020-03-12 NOTE — PROGRESS NOTES
Dx: Lumbar spine pain          Authorized # of Visits:  Medicare, POC 8         Next MD visit: none scheduled  Fall Risk: standard         Precautions: n/a             Subjective: Back pain 4/10. Feeling better today.   Initial evaluation: /current limitati paraspinal mm tension to tolerate walking >/=10 minutes for work and home activities   · Pt will demonstrate improved core strength to be able to perform transferring with </= 2/10 pain   · Pt will be independent and compliant with comprehensive HEP to [facility]

## 2020-03-13 ENCOUNTER — HOSPITAL ENCOUNTER (OUTPATIENT)
Dept: CARDIOLOGY CLINIC | Facility: HOSPITAL | Age: 70
Discharge: HOME | End: 2020-03-13
Attending: SURGERY
Payer: MEDICARE

## 2020-03-13 DIAGNOSIS — I73.9 CLAUDICATION (HCC): ICD-10-CM

## 2020-03-13 PROCEDURE — 93925 LOWER EXTREMITY STUDY: CPT | Performed by: INTERNAL MEDICINE

## 2020-03-14 ENCOUNTER — HOSPITAL ENCOUNTER (OUTPATIENT)
Dept: CT IMAGING | Facility: HOSPITAL | Age: 70
Discharge: HOME | End: 2020-03-14
Attending: SURGERY
Payer: MEDICARE

## 2020-03-14 DIAGNOSIS — I71.4 AAA (ABDOMINAL AORTIC ANEURYSM) (HCC): ICD-10-CM

## 2020-03-14 DIAGNOSIS — I73.9 CLAUDICATION (HCC): ICD-10-CM

## 2020-03-14 LAB — CREAT BLD-MCNC: 1 MG/DL (ref 0.7–1.3)

## 2020-03-14 PROCEDURE — 75635 CT ANGIO ABDOMINAL ARTERIES: CPT | Performed by: SURGERY

## 2020-03-14 PROCEDURE — 82565 ASSAY OF CREATININE: CPT

## 2020-03-16 ENCOUNTER — TELEPHONE (OUTPATIENT)
Dept: PHYSICAL THERAPY | Facility: HOSPITAL | Age: 70
End: 2020-03-16

## 2020-03-16 NOTE — TELEPHONE ENCOUNTER
Contacted pt to discuss department's initiative to protect all non-essential and high risk patients from COVID-19 exposure. Discussed recommendations relative to pt's home program. Explained that the clinic is cancelling visits for the next two weeks.  Kushal

## 2020-03-17 ENCOUNTER — APPOINTMENT (OUTPATIENT)
Dept: PHYSICAL THERAPY | Facility: HOSPITAL | Age: 70
End: 2020-03-17
Attending: PHYSICIAN ASSISTANT
Payer: MEDICARE

## 2020-03-18 ENCOUNTER — TELEPHONE (OUTPATIENT)
Dept: CARDIOLOGY | Age: 70
End: 2020-03-18

## 2020-03-19 ENCOUNTER — APPOINTMENT (OUTPATIENT)
Dept: PHYSICAL THERAPY | Facility: HOSPITAL | Age: 70
End: 2020-03-19
Attending: PHYSICIAN ASSISTANT
Payer: MEDICARE

## 2020-03-24 ENCOUNTER — APPOINTMENT (OUTPATIENT)
Dept: PHYSICAL THERAPY | Facility: HOSPITAL | Age: 70
End: 2020-03-24
Attending: PHYSICIAN ASSISTANT
Payer: MEDICARE

## 2020-03-25 ENCOUNTER — TELEPHONE (OUTPATIENT)
Dept: FAMILY MEDICINE CLINIC | Facility: CLINIC | Age: 70
End: 2020-03-25

## 2020-03-25 RX ORDER — METFORMIN HYDROCHLORIDE 500 MG/1
500 TABLET, EXTENDED RELEASE ORAL
Qty: 90 TABLET | Refills: 0 | Status: SHIPPED | OUTPATIENT
Start: 2020-03-25 | End: 2020-06-01

## 2020-03-25 NOTE — TELEPHONE ENCOUNTER
Pt requesting refill of   Requested Prescriptions     Signed Prescriptions Disp Refills   • metFORMIN HCl  MG Oral Tablet 24 Hr 90 tablet 0     Sig: Take 1 tablet (500 mg total) by mouth daily with breakfast.     Authorizing Provider: Mikey Perez

## 2020-03-26 ENCOUNTER — APPOINTMENT (OUTPATIENT)
Dept: PHYSICAL THERAPY | Facility: HOSPITAL | Age: 70
End: 2020-03-26
Attending: PHYSICIAN ASSISTANT
Payer: MEDICARE

## 2020-03-26 ENCOUNTER — TELEPHONE (OUTPATIENT)
Dept: PHYSICAL THERAPY | Facility: HOSPITAL | Age: 70
End: 2020-03-26

## 2020-03-26 ENCOUNTER — TELEPHONE (OUTPATIENT)
Dept: FAMILY MEDICINE CLINIC | Facility: CLINIC | Age: 70
End: 2020-03-26

## 2020-03-26 NOTE — TELEPHONE ENCOUNTER
Pt called stating he needs a refill on his diabetes medication and it needs to be sent to the [address]

## 2020-03-31 ENCOUNTER — APPOINTMENT (OUTPATIENT)
Dept: PHYSICAL THERAPY | Facility: HOSPITAL | Age: 70
End: 2020-03-31
Attending: PHYSICIAN ASSISTANT
Payer: MEDICARE

## 2020-04-02 ENCOUNTER — APPOINTMENT (OUTPATIENT)
Dept: PHYSICAL THERAPY | Facility: HOSPITAL | Age: 70
End: 2020-04-02
Attending: PHYSICIAN ASSISTANT
Payer: MEDICARE

## 2020-04-07 ENCOUNTER — TELEPHONE (OUTPATIENT)
Dept: PHYSICAL THERAPY | Facility: HOSPITAL | Age: 70
End: 2020-04-07

## 2020-04-07 ENCOUNTER — APPOINTMENT (OUTPATIENT)
Dept: PHYSICAL THERAPY | Facility: HOSPITAL | Age: 70
End: 2020-04-07
Attending: PHYSICIAN ASSISTANT
Payer: MEDICARE

## 2020-04-07 NOTE — TELEPHONE ENCOUNTER
LMOM-Contacted pt to explain that due to continued COVID-19 outbreak, non-essential outpatient rehab patient care has been delayed until 5/4/20.   Future appts will continue to be determined as the situation progresses, and plan to resume visits when approp

## 2020-04-09 ENCOUNTER — APPOINTMENT (OUTPATIENT)
Dept: PHYSICAL THERAPY | Facility: HOSPITAL | Age: 70
End: 2020-04-09
Attending: PHYSICIAN ASSISTANT
Payer: MEDICARE

## 2020-04-14 ENCOUNTER — APPOINTMENT (OUTPATIENT)
Dept: PHYSICAL THERAPY | Facility: HOSPITAL | Age: 70
End: 2020-04-14
Attending: PHYSICIAN ASSISTANT
Payer: MEDICARE

## 2020-04-16 ENCOUNTER — APPOINTMENT (OUTPATIENT)
Dept: PHYSICAL THERAPY | Facility: HOSPITAL | Age: 70
End: 2020-04-16
Attending: PHYSICIAN ASSISTANT
Payer: MEDICARE

## 2020-05-27 LAB
ALBUMIN SERPL-MCNC: 4.1 G/DL
ALBUMIN/GLOB SERPL: 1.7 {RATIO}
ALP SERPL-CCNC: 54 U/L
ALT SERPL-CCNC: 48 UNITS/L
AST SERPL-CCNC: 32 UNITS/L
BASOPHILS # BLD: 49 10*3/UL
BASOPHILS NFR BLD: 0.6 %
BILIRUB SERPL-MCNC: 0.5 MG/DL
BUN SERPL-MCNC: 16 MG/DL
CALCIUM SERPL-MCNC: 9.3 MG/DL
CHLORIDE SERPL-SCNC: 104 MMOL/L
CHOLEST SERPL-MCNC: 148 MG/DL
CHOLEST/HDLC SERPL: 3.6 {RATIO}
CO2 SERPL-SCNC: 25 MMOL/L
CREAT SERPL-MCNC: 1.06 MG/DL
EOSINOPHIL # BLD: 219 10*3/UL
EOSINOPHIL NFR BLD: 2.7 %
ERYTHROCYTE [DISTWIDTH] IN BLOOD: 12.4 %
GLOBULIN SER-MCNC: 2.4 G/DL
GLUCOSE SERPL-MCNC: 115 MG/DL
HBA1C MFR BLD: 6.3 %
HCT VFR BLD CALC: 47.2 %
HDLC SERPL-MCNC: 41 MG/DL
HGB BLD-MCNC: 16.1 G/DL
LDLC SERPL CALC-MCNC: 80 MG/DL
LENGTH OF FAST TIME PATIENT: YES H
LENGTH OF FAST TIME PATIENT: YES H
LYMPHOCYTES # BLD: 2519 10*3/UL
LYMPHOCYTES NFR BLD: 31.1 %
MCH RBC QN AUTO: 30.6 PG
MCHC RBC AUTO-ENTMCNC: 34.1 G/DL
MCV RBC AUTO: 89.6 FL
MONOCYTES # BLD: 875 10*3/UL
MONOCYTES NFR BLD: 10.8 %
MPV (OFFPRE2): 9.7
NEUTROPHILS # BLD: 4439 10*3/UL
NEUTROPHILS NFR BLD: 54.8 %
NONHDLC SERPL-MCNC: 107 MG/DL
PLATELET # BLD: 249 K/MCL
POTASSIUM SERPL-SCNC: 4.5 MMOL/L
PROT SERPL-MCNC: 6.5 G/DL
RBC # BLD: 5.27 10*6/UL
SODIUM SERPL-SCNC: 137 MMOL/L
TRIGL SERPL-MCNC: 172 MG/DL
TSH SERPL-ACNC: 4.25 MCUNITS/ML
WBC # BLD: 8.1 K/MCL

## 2020-06-01 ENCOUNTER — OFFICE VISIT (OUTPATIENT)
Dept: FAMILY MEDICINE CLINIC | Facility: CLINIC | Age: 70
End: 2020-06-01
Payer: MEDICARE

## 2020-06-01 VITALS
TEMPERATURE: 98 F | BODY MASS INDEX: 37.72 KG/M2 | DIASTOLIC BLOOD PRESSURE: 70 MMHG | WEIGHT: 290.81 LBS | RESPIRATION RATE: 18 BRPM | OXYGEN SATURATION: 95 % | HEIGHT: 73.5 IN | HEART RATE: 96 BPM | SYSTOLIC BLOOD PRESSURE: 130 MMHG

## 2020-06-01 DIAGNOSIS — I25.110 CORONARY ARTERY DISEASE INVOLVING NATIVE CORONARY ARTERY OF NATIVE HEART WITH UNSTABLE ANGINA PECTORIS (HCC): ICD-10-CM

## 2020-06-01 DIAGNOSIS — Z23 NEED FOR HEPATITIS B VACCINATION: ICD-10-CM

## 2020-06-01 DIAGNOSIS — E11.51 DM (DIABETES MELLITUS) TYPE II, CONTROLLED, WITH PERIPHERAL VASCULAR DISORDER (HCC): Primary | ICD-10-CM

## 2020-06-01 DIAGNOSIS — E78.2 HYPERLIPIDEMIA, MIXED: ICD-10-CM

## 2020-06-01 DIAGNOSIS — I73.9 PAD (PERIPHERAL ARTERY DISEASE) (HCC): ICD-10-CM

## 2020-06-01 DIAGNOSIS — Z95.1 S/P CABG (CORONARY ARTERY BYPASS GRAFT): ICD-10-CM

## 2020-06-01 PROCEDURE — 99214 OFFICE O/P EST MOD 30 MIN: CPT | Performed by: FAMILY MEDICINE

## 2020-06-01 RX ORDER — EZETIMIBE 10 MG/1
10 TABLET ORAL NIGHTLY
Qty: 90 TABLET | Refills: 0 | Status: SHIPPED | OUTPATIENT
Start: 2020-06-01 | End: 2020-07-13

## 2020-06-01 RX ORDER — METFORMIN HYDROCHLORIDE 500 MG/1
500 TABLET, EXTENDED RELEASE ORAL
Qty: 90 TABLET | Refills: 0 | Status: SHIPPED | OUTPATIENT
Start: 2020-06-01 | End: 2020-09-15

## 2020-06-01 NOTE — PATIENT INSTRUCTIONS
As of October 6th 2014, the Drug Enforcement Agency [facility]) is reclassifying all hydrocodone combination medications from Schedule III to Schedule II. This includes medications such as Norco, Vicodin, Lortab, Zohydro, and Vicoprofen.      What this means for breakdown/rhabdomyolysis. if joint pain or myalgias,or other side effects, stop medication zetia/ ezetimibe/rosuvastatin immediately and call office.       Understanding Coronary Artery Disease (CAD)    To understand coronary artery disease (CAD), you need educational content on 4/1/2019  © 2486-3598 [employer] [address]. [facility] [facility], [address]. All rights reserved. This information is not intended as a substitute for professional medical care.  Always follow your healthcare profession taking it when your cholesterol numbers improve. · Order your refill before your medicine runs out. Side effects  Medicines can cause side effects. These often occur at the start of taking a new medicine.  Side effects can include headache and upset stoma servings you can have at each meal.  ? Grains, beans, and starchy vegetables  ? Vegetables  ? Fruit  ? Milk or yogurt  ? Meat, poultry, fish, or tofu  ? Healthy fats  · Check your blood sugar levels as directed by your provider.  Take any medicine as prescr [address], [facility] Cecilia [place]. All rights reserved. This information is not intended as a substitute for professional medical care. Always follow your healthcare professional's instructions.         Diabetes: Understanding Carbohydrates, Fats, and Protein  F oils, such as olive, canola, and peanut oils. They are found in avocados and some nuts. Monounsaturated fats are healthy for your heart. That’s because they lower LDL (unhealthy) cholesterol. · Polyunsaturated fats. These are mostly found in vegetable oils instructions. Diabetes and Peripheral Arterial Disease (PAD)    Diabetes is a condition where your body has trouble using a sugar (glucose) for energy. This makes your blood sugar level too high. Diabetes is a lifelong (chronic) condition.  It puts y correct foot care, the need for amputation  Diana last reviewed this educational content on 12/1/2018  © 1221-9013 [employer] [address]. 1407 [facility], [address] [place] [address]. All rights reserved.  This information is not intended as a substitut that’s high in these fats increases your bad cholesterol. It's not enough to just cut back on foods containing cholesterol. · Eat about two, 3.5 ounce servings of non-fried fish such as salmon, herring, sardines or mackerel per week .  Most fish contain om [employer] [address]. [address]7 [facility], [address] [place] [address]. All rights reserved. This information is not intended as a substitute for professional medical care. Always follow your healthcare professional's instructions.         Understanding [place] depends on many things. These include your health, the medicines you take, and how active you are. Your healthcare team will help you figure out the right amount of carbs for you. You may start with 45 to 60 grams of carbs per meal, depending on your case. carbohydrates. Total carbohydrate from the label includes sugar, starch, and fiber. Be sure to use the total carbohydrate number and not sugar alone. · Know how many grams of carbs you can have.  Be familiar with the matching portion sizes.   · Compare lab Depending on how severe the retinopathy is, they should be checked every trimester. And for 1 year after the baby is born. Women who get diabetes during pregnancy (gestational diabetes) are not at risk for having retinopathy at that time.    Your health his clearly seen on the photos. You may feel nauseated for a short time during the test. For a few hours after the test, your skin, eyes, and urine may look yellow.  Talk with your provider to learn more about this test.    Diana last reviewed this educatio

## 2020-06-01 NOTE — PROGRESS NOTES
Patient presents with:  Lab Results    DM, HL, HTN f/u. HPI:   Ny Martin is a 71year old male who presents for recheck of his diabetes, HTN, lipids. Patient’s FBS have been . Post prandial not checking. Last A1c value was 6.3% done 5/27/2020. pelvis with runoff completed 3/4/2020. History of AAA and monitored by Dr. Meron Carlisle, cardiologist has upcoming appointment June 22, 2020. History of CAD in angina in 2/2019 unable to perform PTCA with stent and treated medically.   Denies chest pain, kiran 50 MG Oral Tablet 24 Hr Take 1 tablet (50 mg total) by mouth Daily Beta Blocker. 90 tablet 1   • Rosuvastatin Calcium 40 MG Oral Tab Take 40 mg by mouth nightly. • Clopidogrel Bisulfate 75 MG Oral Tab Take 1 tablet (75 mg total) by mouth daily.  30 tabl localized, unspecified site    • Pain in joints    • Personal history of other diseases of circulatory system     CAD   • PVD (peripheral vascular disease) ([facility] 7/14/2014    On screening 2014 legs erum   • Shortness of breath     with exertion   • Sleep ap distress. HEENT: is unremarkable, PERRLA and EOMI. No carotid bruits. No thyromegaly or masses. Heart: Regular rate and rhythm. S1, S2 no murmurs. Lungs: Clear to auscultation bilaterally, with no wheeze, rhonchi, or rales.     Abdomen: soft, NT/ND n mL/min/1.73m2 83   Bun/Creatinine Ratio      6 - 22 (calc) NOT APPLICABLE   SODIUM, SERUM      135 - 146 mmol/L 137   POTASSIUM, SERUM      3.5 - 5.3 mmol/L 4.5   CHLORIDE, SERUM      98 - 110 mmol/L 104   CARBON DIOXIDE      20 - 32 mmol/L 25   CALCIUM annual  Continue current regimen  Repeat labs HgA1c, lipids, CMP in 3 months      3. Coronary artery disease involving native coronary artery of native heart with unstable angina pectoris ([facility] [address]  4.  Hyperlipidemia, mixed  - COMP METABOLIC PANEL (14)  - LIPI Patient is to call with any side effects or complications from the treatments as a result of today.        Imaging & Referrals:  None      10/22/2019  Segundo Brantley DO      Recommendations are: continue present meds, check HgbA1C, check fasting lipids and

## 2020-06-08 ENCOUNTER — TELEPHONE (OUTPATIENT)
Dept: CARDIOLOGY | Age: 70
End: 2020-06-08

## 2020-06-08 ENCOUNTER — TELEPHONE (OUTPATIENT)
Dept: FAMILY MEDICINE CLINIC | Facility: CLINIC | Age: 70
End: 2020-06-08

## 2020-06-08 NOTE — TELEPHONE ENCOUNTER
Pt called office stating he needs his recent lab work from May, faxed to his cardiologist. Fax number is [phone].  Blanka Cueva

## 2020-06-12 ENCOUNTER — HOSPITAL ENCOUNTER (OUTPATIENT)
Dept: CARDIOLOGY CLINIC | Facility: HOSPITAL | Age: 70
Discharge: HOME | End: 2020-06-12
Attending: INTERNAL MEDICINE
Payer: MEDICARE

## 2020-06-12 DIAGNOSIS — E78.2 MIXED HYPERLIPIDEMIA: ICD-10-CM

## 2020-06-12 DIAGNOSIS — I25.10 CAD IN NATIVE ARTERY: ICD-10-CM

## 2020-06-12 DIAGNOSIS — I71.4 ABDOMINAL AORTIC ANEURYSM (AAA) WITHOUT RUPTURE (HCC): ICD-10-CM

## 2020-06-12 PROCEDURE — 93978 VASCULAR STUDY: CPT | Performed by: INTERNAL MEDICINE

## 2020-06-15 ENCOUNTER — TELEPHONE (OUTPATIENT)
Dept: CARDIOLOGY | Age: 70
End: 2020-06-15

## 2020-06-15 DIAGNOSIS — I71.40 ABDOMINAL AORTIC ANEURYSM (AAA) WITHOUT RUPTURE (CMD): ICD-10-CM

## 2020-06-15 DIAGNOSIS — I25.10 CAD IN NATIVE ARTERY: ICD-10-CM

## 2020-06-15 DIAGNOSIS — E78.2 HYPERLIPIDEMIA, MIXED: ICD-10-CM

## 2020-06-15 PROCEDURE — X1094 US VASC ABDOMEN PELVIS VASCULAR DUPLEX STUDY COMPLETE: HCPCS | Performed by: INTERNAL MEDICINE

## 2020-06-18 ENCOUNTER — TELEPHONE (OUTPATIENT)
Dept: CARDIOLOGY | Age: 70
End: 2020-06-18

## 2020-06-18 DIAGNOSIS — I25.10 CAD IN NATIVE ARTERY: Primary | ICD-10-CM

## 2020-06-18 DIAGNOSIS — Z01.810 PREOPERATIVE CARDIOVASCULAR EXAMINATION: ICD-10-CM

## 2020-06-18 DIAGNOSIS — R06.02 SHORTNESS OF BREATH: ICD-10-CM

## 2020-06-19 ENCOUNTER — CLINICAL ABSTRACT (OUTPATIENT)
Dept: CARDIOLOGY | Age: 70
End: 2020-06-19

## 2020-06-19 ENCOUNTER — HOSPITAL ENCOUNTER (OUTPATIENT)
Dept: LAB | Facility: HOSPITAL | Age: 70
Discharge: HOME | End: 2020-06-19
Attending: SURGERY
Payer: MEDICARE

## 2020-06-19 ENCOUNTER — HOSPITAL ENCOUNTER (OUTPATIENT)
Dept: CV DIAGNOSTICS | Facility: HOSPITAL | Age: 70
Discharge: HOME | End: 2020-06-19
Attending: INTERNAL MEDICINE
Payer: MEDICARE

## 2020-06-19 ENCOUNTER — HOSPITAL ENCOUNTER (OUTPATIENT)
Dept: CV DIAGNOSTICS | Facility: HOSPITAL | Age: 70
Discharge: HOME | End: 2020-06-19
Attending: SURGERY
Payer: MEDICARE

## 2020-06-19 DIAGNOSIS — R06.02 SOB (SHORTNESS OF BREATH): ICD-10-CM

## 2020-06-19 DIAGNOSIS — I73.9 CLAUDICATION (HCC): ICD-10-CM

## 2020-06-19 DIAGNOSIS — Z01.810 ENCOUNTER FOR PRE-OPERATIVE CARDIOVASCULAR CLEARANCE: ICD-10-CM

## 2020-06-19 DIAGNOSIS — Z91.89 AT RISK FOR INFECTION: ICD-10-CM

## 2020-06-19 DIAGNOSIS — Z01.818 PREOP TESTING: ICD-10-CM

## 2020-06-19 DIAGNOSIS — I25.10 CAD IN NATIVE ARTERY: ICD-10-CM

## 2020-06-19 DIAGNOSIS — Z91.89 AT RISK FOR BLEEDING: ICD-10-CM

## 2020-06-19 LAB
ANTIBODY SCREEN: NEGATIVE
APTT PPP: 34.3 SECONDS (ref 25.4–36.1)
ATRIAL RATE: 57 BPM
INR BLD: 1.03 (ref 0.89–1.11)
P AXIS: 17 DEGREES
P-R INTERVAL: 188 MS
PSA SERPL DL<=0.01 NG/ML-MCNC: 13.8 SECONDS (ref 12.4–14.6)
Q-T INTERVAL: 430 MS
QRS DURATION: 112 MS
QTC CALCULATION (BEZET): 418 MS
R AXIS: 75 DEGREES
RH BLOOD TYPE: POSITIVE
T AXIS: -5 DEGREES
VENTRICULAR RATE: 57 BPM

## 2020-06-19 PROCEDURE — 85610 PROTHROMBIN TIME: CPT | Performed by: SURGERY

## 2020-06-19 PROCEDURE — 93010 ELECTROCARDIOGRAM REPORT: CPT | Performed by: INTERNAL MEDICINE

## 2020-06-19 PROCEDURE — 86850 RBC ANTIBODY SCREEN: CPT | Performed by: SURGERY

## 2020-06-19 PROCEDURE — 78452 HT MUSCLE IMAGE SPECT MULT: CPT | Performed by: INTERNAL MEDICINE

## 2020-06-19 PROCEDURE — 93017 CV STRESS TEST TRACING ONLY: CPT | Performed by: INTERNAL MEDICINE

## 2020-06-19 PROCEDURE — 86901 BLOOD TYPING SEROLOGIC RH(D): CPT | Performed by: SURGERY

## 2020-06-19 PROCEDURE — 36415 COLL VENOUS BLD VENIPUNCTURE: CPT | Performed by: SURGERY

## 2020-06-19 PROCEDURE — 93005 ELECTROCARDIOGRAM TRACING: CPT

## 2020-06-19 PROCEDURE — 93018 CV STRESS TEST I&R ONLY: CPT | Performed by: INTERNAL MEDICINE

## 2020-06-19 PROCEDURE — 86900 BLOOD TYPING SEROLOGIC ABO: CPT | Performed by: SURGERY

## 2020-06-19 PROCEDURE — 85730 THROMBOPLASTIN TIME PARTIAL: CPT | Performed by: SURGERY

## 2020-06-22 ENCOUNTER — LAB ENCOUNTER (OUTPATIENT)
Dept: LAB | Facility: HOSPITAL | Age: 70
DRG: 301 | End: 2020-06-22
Attending: SURGERY
Payer: MEDICARE

## 2020-06-22 ENCOUNTER — OFFICE VISIT (OUTPATIENT)
Dept: CARDIOLOGY | Age: 70
End: 2020-06-22

## 2020-06-22 VITALS — BODY MASS INDEX: 38.43 KG/M2 | WEIGHT: 290 LBS | HEIGHT: 73 IN

## 2020-06-22 DIAGNOSIS — G47.33 OBSTRUCTIVE SLEEP APNEA SYNDROME: ICD-10-CM

## 2020-06-22 DIAGNOSIS — Z95.1 HX OF CABG: ICD-10-CM

## 2020-06-22 DIAGNOSIS — I25.2 OLD MI (MYOCARDIAL INFARCTION): ICD-10-CM

## 2020-06-22 DIAGNOSIS — I25.10 CAD IN NATIVE ARTERY: ICD-10-CM

## 2020-06-22 DIAGNOSIS — E78.2 HYPERLIPIDEMIA, MIXED: ICD-10-CM

## 2020-06-22 DIAGNOSIS — I70.202 ATHEROSCLEROSIS OF ARTERY OF LEFT LOWER EXTREMITY (CMD): Chronic | ICD-10-CM

## 2020-06-22 DIAGNOSIS — Z01.812 PRE-PROCEDURE LAB EXAM: Primary | ICD-10-CM

## 2020-06-22 DIAGNOSIS — R73.9 HYPERGLYCEMIA: Chronic | ICD-10-CM

## 2020-06-22 DIAGNOSIS — Z01.818 PRE-OP EVALUATION: Primary | ICD-10-CM

## 2020-06-22 DIAGNOSIS — I71.40 ABDOMINAL AORTIC ANEURYSM (AAA) WITHOUT RUPTURE (CMD): ICD-10-CM

## 2020-06-22 PROCEDURE — 99215 OFFICE O/P EST HI 40 MIN: CPT | Performed by: INTERNAL MEDICINE

## 2020-06-22 RX ORDER — EZETIMIBE 10 MG/1
10 TABLET ORAL
COMMUNITY
Start: 2020-06-01

## 2020-06-22 RX ORDER — LISINOPRIL 5 MG/1
TABLET ORAL
COMMUNITY
Start: 2020-03-22 | End: 2020-08-03

## 2020-06-22 RX ORDER — LANCETS
EACH MISCELLANEOUS
COMMUNITY
Start: 2020-02-27 | End: 2021-02-26

## 2020-06-22 RX ORDER — PANTOPRAZOLE SODIUM 40 MG/1
TABLET, DELAYED RELEASE ORAL
COMMUNITY
Start: 2020-02-24

## 2020-06-22 SDOH — HEALTH STABILITY: PHYSICAL HEALTH: ON AVERAGE, HOW MANY DAYS PER WEEK DO YOU ENGAGE IN MODERATE TO STRENUOUS EXERCISE (LIKE A BRISK WALK)?: 0 DAYS

## 2020-06-22 SDOH — HEALTH STABILITY: PHYSICAL HEALTH: ON AVERAGE, HOW MANY MINUTES DO YOU ENGAGE IN EXERCISE AT THIS LEVEL?: 0 MIN

## 2020-06-22 SDOH — SOCIAL STABILITY: SOCIAL NETWORK: ARE YOU MARRIED, WIDOWED, DIVORCED, SEPARATED, NEVER MARRIED, OR LIVING WITH A PARTNER?: MARRIED

## 2020-06-22 ASSESSMENT — PATIENT HEALTH QUESTIONNAIRE - PHQ9
2. FEELING DOWN, DEPRESSED OR HOPELESS: NOT AT ALL
SUM OF ALL RESPONSES TO PHQ9 QUESTIONS 1 AND 2: 0
CLINICAL INTERPRETATION OF PHQ2 SCORE: NO FURTHER SCREENING NEEDED
SUM OF ALL RESPONSES TO PHQ9 QUESTIONS 1 AND 2: 0
CLINICAL INTERPRETATION OF PHQ9 SCORE: NO FURTHER SCREENING NEEDED
1. LITTLE INTEREST OR PLEASURE IN DOING THINGS: NOT AT ALL

## 2020-06-22 ASSESSMENT — ENCOUNTER SYMPTOMS
SUSPICIOUS LESIONS: 0
CHILLS: 0
ALLERGIC/IMMUNOLOGIC COMMENTS: NO NEW FOOD ALLERGIES
HEMATOCHEZIA: 0
WEIGHT GAIN: 0
FEVER: 0
WEIGHT LOSS: 0
COUGH: 0
BRUISES/BLEEDS EASILY: 0
HEMOPTYSIS: 0

## 2020-06-24 ENCOUNTER — ANESTHESIA EVENT (OUTPATIENT)
Dept: CARDIAC SURGERY | Facility: HOSPITAL | Age: 70
DRG: 301 | End: 2020-06-24
Payer: MEDICARE

## 2020-06-24 ENCOUNTER — APPOINTMENT (OUTPATIENT)
Dept: CARDIOLOGY | Age: 70
End: 2020-06-24

## 2020-06-24 ENCOUNTER — HOSPITAL ENCOUNTER (INPATIENT)
Facility: HOSPITAL | Age: 70
LOS: 1 days | Discharge: HOME | DRG: 301 | End: 2020-06-25
Attending: SURGERY | Admitting: SURGERY
Payer: MEDICARE

## 2020-06-24 ENCOUNTER — ANESTHESIA (OUTPATIENT)
Dept: CARDIAC SURGERY | Facility: HOSPITAL | Age: 70
DRG: 301 | End: 2020-06-24
Payer: MEDICARE

## 2020-06-24 PROBLEM — I73.9 CLAUDICATION (HCC): Status: ACTIVE | Noted: 2020-06-24

## 2020-06-24 PROBLEM — I73.9 CLAUDICATION: Status: ACTIVE | Noted: 2020-06-24

## 2020-06-24 LAB
GLUCOSE BLD-MCNC: 109 MG/DL (ref 70–99)
GLUCOSE BLD-MCNC: 109 MG/DL (ref 70–99)
GLUCOSE BLD-MCNC: 138 MG/DL (ref 70–99)
ISTAT ACTIVATED CLOTTING TIME: 136 SECONDS (ref 74–137)
ISTAT BLOOD GAS BASE EXCESS: 0 MMOL/L (ref ?–30)
ISTAT BLOOD GAS HCO3: 25.8 MEQ/L (ref 22–26)
ISTAT BLOOD GAS O2 SATURATION: 100 % (ref 92–100)
ISTAT BLOOD GAS PCO2: 51.6 MMHG (ref 35–45)
ISTAT BLOOD GAS PH: 7.31 (ref 7.35–7.45)
ISTAT BLOOD GAS PO2: 185 MMHG (ref 80–105)
ISTAT BLOOD GAS TCO2: 27 MMOL/L (ref 22–32)
ISTAT HEMATOCRIT: 44 % (ref 37–53)
ISTAT IONIZED CALCIUM: 1.26 MMOL/L (ref 1.12–1.32)
ISTAT POTASSIUM: 4.2 MMOL/L (ref 3.6–5.1)
ISTAT SODIUM: 139 MMOL/L (ref 136–145)

## 2020-06-24 PROCEDURE — B41FYZZ FLUOROSCOPY OF RIGHT LOWER EXTREMITY ARTERIES USING OTHER CONTRAST: ICD-10-PCS | Performed by: SURGERY

## 2020-06-24 PROCEDURE — 5A09357 ASSISTANCE WITH RESPIRATORY VENTILATION, LESS THAN 24 CONSECUTIVE HOURS, CONTINUOUS POSITIVE AIRWAY PRESSURE: ICD-10-PCS | Performed by: SURGERY

## 2020-06-24 PROCEDURE — B41GYZZ FLUOROSCOPY OF LEFT LOWER EXTREMITY ARTERIES USING OTHER CONTRAST: ICD-10-PCS | Performed by: SURGERY

## 2020-06-24 RX ORDER — SODIUM CHLORIDE 9 MG/ML
INJECTION, SOLUTION INTRAVENOUS CONTINUOUS PRN
Status: DISCONTINUED | OUTPATIENT
Start: 2020-06-24 | End: 2020-06-24 | Stop reason: SURG

## 2020-06-24 RX ORDER — METOPROLOL SUCCINATE 50 MG/1
50 TABLET, EXTENDED RELEASE ORAL
Status: DISCONTINUED | OUTPATIENT
Start: 2020-06-25 | End: 2020-06-25

## 2020-06-24 RX ORDER — HYDROCODONE BITARTRATE AND ACETAMINOPHEN 5; 325 MG/1; MG/1
2 TABLET ORAL EVERY 4 HOURS PRN
Status: DISCONTINUED | OUTPATIENT
Start: 2020-06-24 | End: 2020-06-25

## 2020-06-24 RX ORDER — ONDANSETRON 2 MG/ML
INJECTION INTRAMUSCULAR; INTRAVENOUS AS NEEDED
Status: DISCONTINUED | OUTPATIENT
Start: 2020-06-24 | End: 2020-06-24 | Stop reason: SURG

## 2020-06-24 RX ORDER — DEXAMETHASONE SODIUM PHOSPHATE 4 MG/ML
VIAL (ML) INJECTION AS NEEDED
Status: DISCONTINUED | OUTPATIENT
Start: 2020-06-24 | End: 2020-06-24 | Stop reason: SURG

## 2020-06-24 RX ORDER — PANTOPRAZOLE SODIUM 40 MG/1
40 TABLET, DELAYED RELEASE ORAL
Status: DISCONTINUED | OUTPATIENT
Start: 2020-06-25 | End: 2020-06-25

## 2020-06-24 RX ORDER — ROCURONIUM BROMIDE 10 MG/ML
INJECTION, SOLUTION INTRAVENOUS AS NEEDED
Status: DISCONTINUED | OUTPATIENT
Start: 2020-06-24 | End: 2020-06-24 | Stop reason: SURG

## 2020-06-24 RX ORDER — EZETIMIBE 10 MG/1
10 TABLET ORAL NIGHTLY
Status: DISCONTINUED | OUTPATIENT
Start: 2020-06-24 | End: 2020-06-25

## 2020-06-24 RX ORDER — TAMSULOSIN HYDROCHLORIDE 0.4 MG/1
0.4 CAPSULE ORAL 2 TIMES DAILY
Status: DISCONTINUED | OUTPATIENT
Start: 2020-06-24 | End: 2020-06-25

## 2020-06-24 RX ORDER — PHENYLEPHRINE HCL 10 MG/ML
VIAL (ML) INJECTION AS NEEDED
Status: DISCONTINUED | OUTPATIENT
Start: 2020-06-24 | End: 2020-06-24 | Stop reason: SURG

## 2020-06-24 RX ORDER — ACETAMINOPHEN 325 MG/1
650 TABLET ORAL EVERY 4 HOURS PRN
Status: DISCONTINUED | OUTPATIENT
Start: 2020-06-24 | End: 2020-06-25

## 2020-06-24 RX ORDER — ACETAMINOPHEN 500 MG
500 TABLET ORAL EVERY 6 HOURS PRN
Status: DISCONTINUED | OUTPATIENT
Start: 2020-06-24 | End: 2020-06-24

## 2020-06-24 RX ORDER — ASPIRIN 81 MG/1
81 TABLET ORAL DAILY
Status: DISCONTINUED | OUTPATIENT
Start: 2020-06-25 | End: 2020-06-25

## 2020-06-24 RX ORDER — HYDROCODONE BITARTRATE AND ACETAMINOPHEN 5; 325 MG/1; MG/1
1 TABLET ORAL EVERY 4 HOURS PRN
Status: DISCONTINUED | OUTPATIENT
Start: 2020-06-24 | End: 2020-06-25

## 2020-06-24 RX ORDER — DEXTROSE MONOHYDRATE 25 G/50ML
50 INJECTION, SOLUTION INTRAVENOUS
Status: DISCONTINUED | OUTPATIENT
Start: 2020-06-24 | End: 2020-06-25

## 2020-06-24 RX ORDER — LABETALOL HYDROCHLORIDE 5 MG/ML
INJECTION, SOLUTION INTRAVENOUS AS NEEDED
Status: DISCONTINUED | OUTPATIENT
Start: 2020-06-24 | End: 2020-06-24 | Stop reason: SURG

## 2020-06-24 RX ORDER — LISINOPRIL 5 MG/1
5 TABLET ORAL DAILY
Status: DISCONTINUED | OUTPATIENT
Start: 2020-06-24 | End: 2020-06-25

## 2020-06-24 RX ORDER — ESMOLOL HYDROCHLORIDE 10 MG/ML
INJECTION INTRAVENOUS AS NEEDED
Status: DISCONTINUED | OUTPATIENT
Start: 2020-06-24 | End: 2020-06-24 | Stop reason: SURG

## 2020-06-24 RX ORDER — AMLODIPINE BESYLATE 5 MG/1
5 TABLET ORAL DAILY
Status: DISCONTINUED | OUTPATIENT
Start: 2020-06-24 | End: 2020-06-25

## 2020-06-24 RX ORDER — CLOPIDOGREL BISULFATE 75 MG/1
75 TABLET ORAL DAILY
Status: DISCONTINUED | OUTPATIENT
Start: 2020-06-25 | End: 2020-06-25

## 2020-06-24 RX ORDER — ROSUVASTATIN CALCIUM 40 MG/1
40 TABLET, COATED ORAL NIGHTLY
Status: DISCONTINUED | OUTPATIENT
Start: 2020-06-24 | End: 2020-06-25

## 2020-06-24 RX ADMIN — ESMOLOL HYDROCHLORIDE 30 MG: 10 INJECTION INTRAVENOUS at 18:28:00

## 2020-06-24 RX ADMIN — SODIUM CHLORIDE: 9 INJECTION, SOLUTION INTRAVENOUS at 17:35:00

## 2020-06-24 RX ADMIN — ROCURONIUM BROMIDE 80 MG: 10 INJECTION, SOLUTION INTRAVENOUS at 17:09:00

## 2020-06-24 RX ADMIN — SODIUM CHLORIDE: 9 INJECTION, SOLUTION INTRAVENOUS at 18:40:00

## 2020-06-24 RX ADMIN — ONDANSETRON 4 MG: 2 INJECTION INTRAMUSCULAR; INTRAVENOUS at 18:23:00

## 2020-06-24 RX ADMIN — DEXAMETHASONE SODIUM PHOSPHATE 4 MG: 4 MG/ML VIAL (ML) INJECTION at 17:28:00

## 2020-06-24 RX ADMIN — LABETALOL HYDROCHLORIDE 10 MG: 5 INJECTION, SOLUTION INTRAVENOUS at 18:35:00

## 2020-06-24 RX ADMIN — PHENYLEPHRINE HCL 100 MCG: 10 MG/ML VIAL (ML) INJECTION at 17:08:00

## 2020-06-24 RX ADMIN — PHENYLEPHRINE HCL 100 MCG: 10 MG/ML VIAL (ML) INJECTION at 17:35:00

## 2020-06-24 RX ADMIN — PHENYLEPHRINE HCL 100 MCG: 10 MG/ML VIAL (ML) INJECTION at 17:32:00

## 2020-06-24 RX ADMIN — SODIUM CHLORIDE: 9 INJECTION, SOLUTION INTRAVENOUS at 17:02:00

## 2020-06-24 NOTE — H&P
[facility]    PATIENT'S NAME: Mikey Rafiq   ATTENDING PHYSICIAN: Krishan Winters M.D.    PATIENT ACCOUNT#:   [de-identified]    LOCATION:    MEDICAL RECORD #:   [id number]       YOB: 1950  ADMISSION DATE:       06/24/2020    HISTORY AND PHY are 20. HEENT:  Pupils equal and round. Sclerae are clear. Mouth without lesions. LUNGS:  Clear to auscultation. EXTREMITIES:  The femoral pulses are palpable on the right, diminished on the left.   Popliteal and pedal pulses are diminished bilateral

## 2020-06-24 NOTE — ANESTHESIA PREPROCEDURE EVALUATION
PRE-OP EVALUATION    Patient Name: Bryce Boogie    Pre-op Diagnosis: claudication    Procedure(s):   Aortic angiogram, left iliac percutaneous transluminal angioplasty/stent, possible open cutdown of left femoral artery  lex OLIVARES    Surgeon(s) and Role: daily., Disp: , Rfl:   aspirin 81 MG Oral Tab EC, Take 81 mg by mouth daily. , Disp: , Rfl:         Allergies: Patient has no known allergies. Anesthesia Evaluation    Patient summary reviewed.     Anesthetic Complications  (-) history of anesthetic c of cervical region [facility])     Cervical disc herniation     DM (diabetes mellitus) type II, controlled, with peripheral vascular disorder ([address]     Peripheral vascular disease in diabetes mellitus ([address]     Sensorineural hearing loss     Polycythemia     Sev 05/27/2020    RDW 12.4 05/27/2020     05/27/2020     Lab Results   Component Value Date     05/27/2020    K 4.5 05/27/2020     05/27/2020    CO2 25 05/27/2020    BUN 16 05/27/2020    CREATSERUM 1.06 05/27/2020     (H) 05/27/2020

## 2020-06-24 NOTE — OPERATIVE REPORT
Pre-op Dx: bilateral claudication. Post-op Dx: enlarged arteris. Left common iliac 80% stenosis/ Right common iliac 40-50% stenosis. Bilateral SFA diseaser. Right tibial- peroneal trunk disease. 6Fr Angioseal closure- Duplex US guided Left CFA puncture.  Op

## 2020-06-24 NOTE — ANESTHESIA PROCEDURE NOTES
Airway  Date/Time: 6/24/2020 5:12 PM  Urgency: elective      General Information and Staff    Patient location during procedure: OR  Anesthesiologist: Indy Arita MD  Performed: anesthesiologist     Indications and Patient Condition  Indications for air

## 2020-06-24 NOTE — ANESTHESIA POSTPROCEDURE EVALUATION
[facility] Patient Status:  Inpatient   Age/Gender 71year old male MRN [id number]   Location [address] [place] [address] Attending Rosendo Diaz MD   Hosp Day # 0 PCP Soila Guerrero DO       Anesthesia Post-op Note    Pro

## 2020-06-25 VITALS
OXYGEN SATURATION: 95 % | RESPIRATION RATE: 18 BRPM | WEIGHT: 287.31 LBS | HEART RATE: 67 BPM | DIASTOLIC BLOOD PRESSURE: 64 MMHG | SYSTOLIC BLOOD PRESSURE: 128 MMHG | TEMPERATURE: 98 F | HEIGHT: 73 IN | BODY MASS INDEX: 38.08 KG/M2

## 2020-06-25 PROBLEM — G47.33 OSA ON CPAP: Status: ACTIVE | Noted: 2017-06-14

## 2020-06-25 PROBLEM — Z99.89 OSA ON CPAP: Status: ACTIVE | Noted: 2017-06-14

## 2020-06-25 LAB
ALBUMIN SERPL-MCNC: 3.4 G/DL (ref 3.4–5)
ALBUMIN/GLOB SERPL: 1 {RATIO} (ref 1–2)
ALP LIVER SERPL-CCNC: 51 U/L (ref 45–117)
ALT SERPL-CCNC: 53 U/L (ref 16–61)
ANION GAP SERPL CALC-SCNC: 4 MMOL/L (ref 0–18)
AST SERPL-CCNC: 25 U/L (ref 15–37)
BASOPHILS # BLD AUTO: 0.01 X10(3) UL (ref 0–0.2)
BASOPHILS NFR BLD AUTO: 0.1 %
BILIRUB SERPL-MCNC: 0.6 MG/DL (ref 0.1–2)
BUN BLD-MCNC: 19 MG/DL (ref 7–18)
BUN/CREAT SERPL: 16.1 (ref 10–20)
CALCIUM BLD-MCNC: 8.4 MG/DL (ref 8.5–10.1)
CHLORIDE SERPL-SCNC: 106 MMOL/L (ref 98–112)
CO2 SERPL-SCNC: 26 MMOL/L (ref 21–32)
CREAT BLD-MCNC: 1.18 MG/DL (ref 0.7–1.3)
DEPRECATED RDW RBC AUTO: 41.5 FL (ref 35.1–46.3)
EOSINOPHIL # BLD AUTO: 0.01 X10(3) UL (ref 0–0.7)
EOSINOPHIL NFR BLD AUTO: 0.1 %
ERYTHROCYTE [DISTWIDTH] IN BLOOD BY AUTOMATED COUNT: 12.3 % (ref 11–15)
GLOBULIN PLAS-MCNC: 3.5 G/DL (ref 2.8–4.4)
GLUCOSE BLD-MCNC: 106 MG/DL (ref 70–99)
GLUCOSE BLD-MCNC: 110 MG/DL (ref 70–99)
GLUCOSE BLD-MCNC: 123 MG/DL (ref 70–99)
GLUCOSE BLD-MCNC: 190 MG/DL (ref 70–99)
GLUCOSE BLD-MCNC: 99 MG/DL (ref 70–99)
HCT VFR BLD AUTO: 46.2 % (ref 39–53)
HGB BLD-MCNC: 15.2 G/DL (ref 13–17.5)
IMM GRANULOCYTES # BLD AUTO: 0.03 X10(3) UL (ref 0–1)
IMM GRANULOCYTES NFR BLD: 0.3 %
LYMPHOCYTES # BLD AUTO: 1.83 X10(3) UL (ref 1–4)
LYMPHOCYTES NFR BLD AUTO: 18.7 %
M PROTEIN MFR SERPL ELPH: 6.9 G/DL (ref 6.4–8.2)
MCH RBC QN AUTO: 30.4 PG (ref 26–34)
MCHC RBC AUTO-ENTMCNC: 32.9 G/DL (ref 31–37)
MCV RBC AUTO: 92.4 FL (ref 80–100)
MONOCYTES # BLD AUTO: 0.38 X10(3) UL (ref 0.1–1)
MONOCYTES NFR BLD AUTO: 3.9 %
NEUTROPHILS # BLD AUTO: 7.52 X10 (3) UL (ref 1.5–7.7)
NEUTROPHILS # BLD AUTO: 7.52 X10(3) UL (ref 1.5–7.7)
NEUTROPHILS NFR BLD AUTO: 76.9 %
OSMOLALITY SERPL CALC.SUM OF ELEC: 289 MOSM/KG (ref 275–295)
PLATELET # BLD AUTO: 240 10(3)UL (ref 150–450)
POTASSIUM SERPL-SCNC: 4.6 MMOL/L (ref 3.5–5.1)
RBC # BLD AUTO: 5 X10(6)UL (ref 3.8–5.8)
SODIUM SERPL-SCNC: 136 MMOL/L (ref 136–145)
WBC # BLD AUTO: 9.8 X10(3) UL (ref 4–11)

## 2020-06-25 PROCEDURE — 99232 SBSQ HOSP IP/OBS MODERATE 35: CPT | Performed by: HOSPITALIST

## 2020-06-25 PROCEDURE — 99232 SBSQ HOSP IP/OBS MODERATE 35: CPT | Performed by: INTERNAL MEDICINE

## 2020-06-25 NOTE — CONSULTS
[facility]  Progress Note    Cherie Wagner Patient Status:  Inpatient    1950 MRN [id number]   [facility] 8NE-A Attending Haylee Phillips MD   [facility] Day # 1 PCP Mary Roberson DO     Subjective:  See my office note 2020 in c Claud[facility])  Active Problems:    BPH (benign prostatic hyperplasia)    S/P CABG (coronary artery bypass graft)    Carotid artery disease (HCC)    AAA (abdominal aortic aneurysm) ([facility] [address]    DM (diabetes mellitus) type II, controlled, with peripheral va

## 2020-06-25 NOTE — PLAN OF CARE
Assumed care of patient @ 0730, A/OX4, answers questions appropriately. S/P angiogram w/ Dr. Vickie Ellington and Kody España - will interventions until outpatient, pt would eventually have PTA/stent L. illiac artery in future per Dr. Claire Schaumann note.  Dr. Vickie Ellington paged reg coronary artery perfusion - ex.  Angina  - Evaluate fluid balance, assess for edema, trend weights  Outcome: Progressing

## 2020-06-25 NOTE — PROGRESS NOTES
[facility] HOSPITAL[facility]  Progress Note     Sahara Soodar Patient Status:  Inpatient    1950 MRN [id number]   [facility] 8NE-A Attending Lucía Browning MD   [facility] Day # 1 PCP Umesh Geller DO     Chief Complaint: Medical management    S: Sherrell Freitas 75 mg Oral Daily   • ezetimibe  10 mg Oral Nightly   • lisinopril  5 mg Oral Daily   • Metoprolol Succinate ER  50 mg Oral Daily Beta Blocker   • Pantoprazole Sodium  40 [facility]   • Rosuvastatin Calcium  40 mg Oral Nightly   • tamsulosin HCl  0.4 mg

## 2020-06-25 NOTE — DISCHARGE SUMMARY
[facility]    PATIENT'S NAME: Bryantnathanael Tessa   ATTENDING PHYSICIAN: Ron Ha M.D.    PATIENT ACCOUNT#:   [de-identified]    LOCATION:  [address] [place] [address]  MEDICAL RECORD #:   [id number]       YOB: 1950  ADMISSION DATE:       06/24/2020

## 2020-06-25 NOTE — CONSULTS
[address] Patient Status:  Inpatient    1950 MRN [id number]   [facility] 8NE-A Attending Barbie Hensley MD   Hosp Day # 0 PCP Dashawn Sparks DO     Reason for consult: Medical management    Request legs erum   • Shortness of breath     with exertion   • Sleep apnea     cpap   • Type II or unspecified type diabetes mellitus without mention of complication, not stated as uncontrolled    • Visual impairment     wears glasses   • Wears glasses         Pas lancet by Finger stick route 2 (two) times daily.  Use as directed., Disp: 1 Box, Rfl: 11  Pantoprazole Sodium 40 MG Oral Tab EC, Take one tablet (40 mg total) by mouth once daily, 30 minutes prior to breakfast., Disp: 90 tablet, Rfl: 3  tamsulosin HCl 0.4 auscultation bilaterally. No wheezes. No rhonchi. Cardiovascular: S1, S2. Regular rate and rhythm. No murmurs, rubs or gallops. Equal pulses. Chest and Back: No tenderness or deformity. Abdomen: Soft, nontender, nondistended. Positive bowel sounds.  No

## 2020-06-25 NOTE — PLAN OF CARE
Pt A & Ox4. Post lower angio. Accessed L groin site with angio seal close. No intervention at this time. L groin dressing CDI. L pedal pulse 1+. R pedal pulse only by doppler. Extremities Warm. Pt tolerated recovery well. Bender removed and pt voided since. Monitor vital signs, rhythm, and trends  - Monitor for bleeding, hypotension and signs of decreased cardiac output  - Evaluate effectiveness of vasoactive medications to optimize hemodynamic stability  - Monitor arterial and/or venous puncture sites for bl

## 2020-06-25 NOTE — PROGRESS NOTES
Orthos negative for this patient on admission       06/25/20 0000 06/25/20 0005 06/25/20 0015   Vital Signs   Pulse 72 72 71   Heart Rate Source Monitor Monitor Monitor   /58 134/65 153/64   BP Location Left arm Left arm Left arm   BP Method Auto

## 2020-06-25 NOTE — RESPIRATORY THERAPY NOTE
KRISTEN : EQUIPMENT USE: DAILY SUMMARY                                            SET MODE:AUTO CPAP WITH CFLEX                                          USAGE IN HOURS:6:31                                          90%

## 2020-06-25 NOTE — OPERATIVE REPORT
[facility]    PATIENT'S NAME: Poncho Gaston   ATTENDING PHYSICIAN: Chen Weaver M.D. OPERATING PHYSICIAN: Chen Weaver M.D.    PATIENT ACCOUNT#:   [de-identified]    LOCATION:  [address] [place] [address]  MEDICAL RECORD #:   [id number]       DATE OF BIRTH:  [birth year] superficial femoral artery occlusive disease of 70% to 75% in the mid thigh bilaterally and the adductor canal bilaterally. 5.   High-grade stenosis of right distal popliteal tibioperoneal trunk.     OPERATIVE TECHNIQUE:  Patient placed supine on the proc beginning part of the tibial vessels.     The case discussed by myself with Dr. Philip Scott, and due to the severe calcification and the measurements of the aorta being approximately 12 mm with 3 mm at the bifurcation and enlargement at the iliac arteries, p

## 2020-06-26 ENCOUNTER — TELEPHONE (OUTPATIENT)
Dept: FAMILY MEDICINE CLINIC | Facility: CLINIC | Age: 70
End: 2020-06-26

## 2020-06-26 ENCOUNTER — HOSPITAL ENCOUNTER (EMERGENCY)
Facility: HOSPITAL | Age: 70
Discharge: HOME | End: 2020-06-26
Attending: EMERGENCY MEDICINE
Payer: MEDICARE

## 2020-06-26 VITALS
TEMPERATURE: 98 F | SYSTOLIC BLOOD PRESSURE: 112 MMHG | OXYGEN SATURATION: 95 % | RESPIRATION RATE: 22 BRPM | DIASTOLIC BLOOD PRESSURE: 69 MMHG | BODY MASS INDEX: 38.43 KG/M2 | HEART RATE: 56 BPM | WEIGHT: 290 LBS | HEIGHT: 73 IN

## 2020-06-26 DIAGNOSIS — T78.40XA ALLERGIC REACTION, INITIAL ENCOUNTER: Primary | ICD-10-CM

## 2020-06-26 LAB — BLOOD TYPE BARCODE: 7300

## 2020-06-26 PROCEDURE — 96374 THER/PROPH/DIAG INJ IV PUSH: CPT

## 2020-06-26 PROCEDURE — 99284 EMERGENCY DEPT VISIT MOD MDM: CPT

## 2020-06-26 PROCEDURE — 96375 TX/PRO/DX INJ NEW DRUG ADDON: CPT

## 2020-06-26 PROCEDURE — 96376 TX/PRO/DX INJ SAME DRUG ADON: CPT

## 2020-06-26 PROCEDURE — S0028 INJECTION, FAMOTIDINE, 20 MG: HCPCS | Performed by: EMERGENCY MEDICINE

## 2020-06-26 RX ORDER — DIPHENHYDRAMINE HYDROCHLORIDE 50 MG/ML
25 INJECTION INTRAMUSCULAR; INTRAVENOUS ONCE
Status: COMPLETED | OUTPATIENT
Start: 2020-06-26 | End: 2020-06-26

## 2020-06-26 RX ORDER — METHYLPREDNISOLONE SODIUM SUCCINATE 125 MG/2ML
125 INJECTION, POWDER, LYOPHILIZED, FOR SOLUTION INTRAMUSCULAR; INTRAVENOUS ONCE
Status: COMPLETED | OUTPATIENT
Start: 2020-06-26 | End: 2020-06-26

## 2020-06-26 RX ORDER — PREDNISONE 20 MG/1
40 TABLET ORAL DAILY
Qty: 10 TABLET | Refills: 0 | Status: SHIPPED | OUTPATIENT
Start: 2020-06-26 | End: 2020-07-01

## 2020-06-26 RX ORDER — DIPHENHYDRAMINE HYDROCHLORIDE 50 MG/ML
INJECTION INTRAMUSCULAR; INTRAVENOUS
Status: COMPLETED
Start: 2020-06-26 | End: 2020-06-26

## 2020-06-26 RX ORDER — FAMOTIDINE 10 MG/ML
20 INJECTION, SOLUTION INTRAVENOUS ONCE
Status: COMPLETED | OUTPATIENT
Start: 2020-06-26 | End: 2020-06-26

## 2020-06-26 NOTE — TELEPHONE ENCOUNTER
Spoke with patient and he said the surgeon just returned his call and recommended patient go to the ED  Patient said his face is red and swelling and it is beginning to spread    Patient was currently in route to the ED

## 2020-06-26 NOTE — ED PROVIDER NOTES
Patient Seen in: [facility] Emergency Department      History   Patient presents with:  Swelling Edema    Stated Complaint: facial swelling, no CAT, throat does not feel itchy but neck and back do. had s*    HPI    This is a very pleasant 69-year-ol CAD   • PVD (peripheral vascular disease) ([facility] [address] 7/14/2014    On screening 2014 legs erum   • Shortness of breath     with exertion   • Sleep apnea     cpap   • Type II or unspecified type diabetes mellitus without mention of complication, not stated as unco 133/88   Pulse 61   Temp 98.1 °F (36.7 °C) (Temporal)   Resp 16   Ht 185.4 cm (6' 1\")   Wt 131.5 kg   SpO2 99%   BMI 38.26 kg/m²         Physical Exam    GENERAL: Awake, alert oriented x3, nontoxic appearing. SKIN: Normal, warm, and dry.   Flushing noted mouth daily for 5 days.   Qty: 10 tablet Refills: 0

## 2020-06-26 NOTE — ED INITIAL ASSESSMENT (HPI)
Pt reports woke up this morning and had swelling to the face. Pt reports itching and swelling moving down. No CAT. No itchy thorat. No other complaints. No medication taken. Pt reports swelling looks better. Called his doctor and told to come in.

## 2020-06-26 NOTE — TELEPHONE ENCOUNTER
Patient called stating he had surgery on 6/24/2020 and now has a red, swollen face. He believes it may be an allergic reaction to something from the surgery.

## 2020-07-01 ENCOUNTER — OFFICE VISIT (OUTPATIENT)
Dept: FAMILY MEDICINE CLINIC | Facility: CLINIC | Age: 70
End: 2020-07-01
Payer: MEDICARE

## 2020-07-01 VITALS
WEIGHT: 291.63 LBS | DIASTOLIC BLOOD PRESSURE: 70 MMHG | BODY MASS INDEX: 38.65 KG/M2 | TEMPERATURE: 99 F | HEART RATE: 56 BPM | SYSTOLIC BLOOD PRESSURE: 138 MMHG | RESPIRATION RATE: 18 BRPM | HEIGHT: 73 IN | OXYGEN SATURATION: 94 %

## 2020-07-01 DIAGNOSIS — L98.9 SKIN LESION OF LEFT LEG: ICD-10-CM

## 2020-07-01 DIAGNOSIS — T78.40XA ALLERGIC REACTION, INITIAL ENCOUNTER: Primary | ICD-10-CM

## 2020-07-01 PROBLEM — I70.202 ATHEROSCLEROSIS OF ARTERY OF LEFT LOWER EXTREMITY (HCC): Status: ACTIVE | Noted: 2020-06-22

## 2020-07-01 PROBLEM — I70.202 ATHEROSCLEROSIS OF ARTERY OF LEFT LOWER EXTREMITY: Status: ACTIVE | Noted: 2020-06-22

## 2020-07-01 PROCEDURE — 1111F DSCHRG MED/CURRENT MED MERGE: CPT | Performed by: FAMILY MEDICINE

## 2020-07-01 PROCEDURE — 99214 OFFICE O/P EST MOD 30 MIN: CPT | Performed by: FAMILY MEDICINE

## 2020-07-01 RX ORDER — PREDNISONE 20 MG/1
20 TABLET ORAL DAILY
Qty: 3 TABLET | Refills: 0 | Status: SHIPPED | OUTPATIENT
Start: 2020-07-01 | End: 2020-07-04

## 2020-07-01 NOTE — PROGRESS NOTES
CHIEF COMPLAINT: Patient presents with:  Er F/u: Lisette Chavarria ER f/u 6/26/20 for allergic reaction during surgery       HPI:     Myla Loveless is a 71year old male presents for follow-up 2 days after a failed angiogram on 6/24/2020 for severe claudication of th 7/14/2014    mild   • Cataract, left     already had R eye update 2016   • Cervical disc herniation 5/23/2016   • Coronary atherosclerosis    • Diastasis recti 12/30/2014    ventral   • Diverticulosis 12/30/2014   • Easy bruising    • Erythrocytosis 2/9/20 Cancer Father    • Heart Disorder Father    • Heart Attack Father    • Other (CAD) Father    • Other (lung cancer) Father    • Arthritis Mother    • Diabetes Mother    • Other (diabetes mellitus) Mother       Social History: Social History    Tobacco Use 7/1/2020 ) 100 strip 11   • Accu-Chek FastClix Lancets Does not apply Misc 1 lancet by Finger stick route 2 (two) times daily. Use as directed.  (Patient not taking: Reported on 7/1/2020 ) 1 Box 11   • Blood Glucose Monitoring Suppl (ACCU-CHEK JAQUELINE PLUS) w tenderness    LABS     Admission on 06/24/2020, Discharged on 06/25/2020   Component Date Value   • Blood Product 06/26/2020 [id number]    • Unit Number 06/26/2020 [id number]    • UNIT ABO 06/26/2020 B    • UNIT RH 06/26/2020 POS    • Product Status 06/ MCHC 06/25/2020 32.9    • RDW 06/25/2020 12.3    • RDW-SD 06/25/2020 41.5    • Neutrophil Absolute Prel* 06/25/2020 7.52    • Neutrophil Absolute 06/25/2020 7.52    • Lymphocyte Absolute 06/25/2020 1.83    • Monocyte Absolute 06/25/2020 0.38    • Eosinophi needs evaluation and possible biopsy    Meds This Visit:  Requested Prescriptions      No prescriptions requested or ordered in this encounter       Health Maintenance:  Diabetes Care Dilated Eye Exam due on 03/06/2020  Influenza Vaccine(1) due on 09/01/20 by endoscopy     BMI 37.0-37.9, adult     Lumbar degenerative disc disease     PAD (peripheral artery disease) ([facility] [address]     Claudication ([facility] [address]     Atherosclerosis of artery of left lower extremity [facility])      Imaging & Referrals:  None     7/1/2020  Sacha Oconnor

## 2020-07-01 NOTE — DISCHARGE SUMMARY
[facility]    PATIENT'S NAME: Tony Caldwell   ATTENDING PHYSICIAN: Kim Duggan M.D.    PATIENT ACCOUNT#:   [de-identified]    LOCATION:  [address] [place] [address]  MEDICAL RECORD #:   [id number]       YOB: 1950  ADMISSION DATE:       06/24/2020

## 2020-07-07 ENCOUNTER — TELEPHONE (OUTPATIENT)
Dept: FAMILY MEDICINE CLINIC | Facility: CLINIC | Age: 70
End: 2020-07-07

## 2020-07-07 DIAGNOSIS — L98.9 SKIN LESION OF LEFT LEG: Primary | ICD-10-CM

## 2020-07-07 NOTE — TELEPHONE ENCOUNTER
Per referral department Dr Valentino Rich is out of network   Please advise on new dermatologist  Referral department recommends updated previous referral dated 7/1/2020

## 2020-07-08 NOTE — TELEPHONE ENCOUNTER
New referral for dermatologist placed. If out of network need recommendation from referral department with local in network physician.   Thank you

## 2020-07-11 DIAGNOSIS — E78.2 HYPERLIPIDEMIA, MIXED: ICD-10-CM

## 2020-07-11 DIAGNOSIS — Z95.1 S/P CABG (CORONARY ARTERY BYPASS GRAFT): ICD-10-CM

## 2020-07-11 DIAGNOSIS — I25.110 CORONARY ARTERY DISEASE INVOLVING NATIVE CORONARY ARTERY OF NATIVE HEART WITH UNSTABLE ANGINA PECTORIS (HCC): ICD-10-CM

## 2020-07-11 DIAGNOSIS — I73.9 PAD (PERIPHERAL ARTERY DISEASE) (HCC): ICD-10-CM

## 2020-07-12 DIAGNOSIS — Z20.822 ENCOUNTER FOR PREPROCEDURE SCREENING LABORATORY TESTING FOR COVID-19: Primary | ICD-10-CM

## 2020-07-12 DIAGNOSIS — Z01.812 ENCOUNTER FOR PREPROCEDURE SCREENING LABORATORY TESTING FOR COVID-19: Primary | ICD-10-CM

## 2020-07-13 ENCOUNTER — LAB ENCOUNTER (OUTPATIENT)
Dept: LAB | Facility: HOSPITAL | Age: 70
DRG: 254 | End: 2020-07-13
Attending: SURGERY
Payer: MEDICARE

## 2020-07-13 DIAGNOSIS — Z20.822 ENCOUNTER FOR PREPROCEDURE SCREENING LABORATORY TESTING FOR COVID-19: ICD-10-CM

## 2020-07-13 DIAGNOSIS — Z01.812 ENCOUNTER FOR PREPROCEDURE SCREENING LABORATORY TESTING FOR COVID-19: ICD-10-CM

## 2020-07-13 LAB — SARS-COV-2 RNA RESP QL NAA+PROBE: NOT DETECTED

## 2020-07-13 RX ORDER — EZETIMIBE 10 MG/1
TABLET ORAL
Qty: 90 TABLET | Refills: 0 | Status: SHIPPED | OUTPATIENT
Start: 2020-07-13 | End: 2020-10-28

## 2020-07-13 RX ORDER — METOPROLOL SUCCINATE 50 MG/1
TABLET, EXTENDED RELEASE ORAL
Qty: 90 TABLET | Refills: 0 | Status: SHIPPED | OUTPATIENT
Start: 2020-07-13 | End: 2020-09-15

## 2020-07-13 RX ORDER — AMLODIPINE BESYLATE 5 MG/1
TABLET ORAL
Qty: 90 TABLET | Refills: 0 | Status: SHIPPED | OUTPATIENT
Start: 2020-07-13 | End: 2020-09-15

## 2020-07-13 NOTE — TELEPHONE ENCOUNTER
Pt requesting refill of   Requested Prescriptions     Pending Prescriptions Disp Refills   • METOPROLOL SUCCINATE ER 50 MG Oral Tablet 24 Hr [Pharmacy Med Name: METOPROLOL SUCCINATE ER 50 MG Tablet Extended Release 24 Hour] 90 tablet 1     Sig: TAKE 1 TABL

## 2020-07-16 ENCOUNTER — HOSPITAL ENCOUNTER (INPATIENT)
Facility: HOSPITAL | Age: 70
LOS: 1 days | Discharge: HOME | DRG: 254 | End: 2020-07-17
Attending: SURGERY | Admitting: SURGERY
Payer: MEDICARE

## 2020-07-16 ENCOUNTER — ANESTHESIA EVENT (OUTPATIENT)
Dept: CARDIAC SURGERY | Facility: HOSPITAL | Age: 70
DRG: 254 | End: 2020-07-16
Payer: MEDICARE

## 2020-07-16 ENCOUNTER — ANESTHESIA (OUTPATIENT)
Dept: CARDIAC SURGERY | Facility: HOSPITAL | Age: 70
DRG: 254 | End: 2020-07-16
Payer: MEDICARE

## 2020-07-16 DIAGNOSIS — Z01.818 PREOP TESTING: ICD-10-CM

## 2020-07-16 DIAGNOSIS — I70.203 BILATERAL ATHEROSCLEROSIS OF LEGS (HCC): Primary | ICD-10-CM

## 2020-07-16 LAB
APTT PPP: >300 SECONDS (ref 25.4–36.1)
ATRIAL RATE: 81 BPM
GLUCOSE BLD-MCNC: 138 MG/DL (ref 70–99)
GLUCOSE BLD-MCNC: 144 MG/DL (ref 70–99)
GLUCOSE BLD-MCNC: 173 MG/DL (ref 70–99)
GLUCOSE BLD-MCNC: 177 MG/DL (ref 70–99)
GLUCOSE BLD-MCNC: 178 MG/DL (ref 70–99)
GLUCOSE BLD-MCNC: 190 MG/DL (ref 70–99)
GLUCOSE BLD-MCNC: 191 MG/DL (ref 70–99)
INR BLD: 1.32 (ref 0.89–1.11)
ISTAT ACTIVATED CLOTTING TIME: 114 SECONDS (ref 74–137)
ISTAT ACTIVATED CLOTTING TIME: 125 SECONDS (ref 74–137)
ISTAT ACTIVATED CLOTTING TIME: 252 SECONDS (ref 74–137)
ISTAT ACTIVATED CLOTTING TIME: 290 SECONDS (ref 74–137)
ISTAT BLOOD GAS BASE EXCESS: -1 MMOL/L (ref ?–30)
ISTAT BLOOD GAS BASE EXCESS: -2 MMOL/L (ref ?–30)
ISTAT BLOOD GAS HCO3: 24.6 MEQ/L (ref 22–26)
ISTAT BLOOD GAS HCO3: 25.3 MEQ/L (ref 22–26)
ISTAT BLOOD GAS O2 SATURATION: 100 % (ref 92–100)
ISTAT BLOOD GAS O2 SATURATION: 97 % (ref 92–100)
ISTAT BLOOD GAS PCO2: 45.1 MMHG (ref 35–45)
ISTAT BLOOD GAS PCO2: 56.8 MMHG (ref 35–45)
ISTAT BLOOD GAS PH: 7.26 (ref 7.35–7.45)
ISTAT BLOOD GAS PH: 7.35 (ref 7.35–7.45)
ISTAT BLOOD GAS PO2: 101 MMHG (ref 80–105)
ISTAT BLOOD GAS PO2: 201 MMHG (ref 80–105)
ISTAT BLOOD GAS TCO2: 26 MMOL/L (ref 22–32)
ISTAT BLOOD GAS TCO2: 27 MMOL/L (ref 22–32)
ISTAT HEMATOCRIT: 43 % (ref 37–53)
ISTAT HEMATOCRIT: 44 % (ref 37–53)
ISTAT IONIZED CALCIUM: 1.23 MMOL/L (ref 1.12–1.32)
ISTAT IONIZED CALCIUM: 1.27 MMOL/L (ref 1.12–1.32)
ISTAT POTASSIUM: 4.4 MMOL/L (ref 3.6–5.1)
ISTAT POTASSIUM: 5.1 MMOL/L (ref 3.6–5.1)
ISTAT SODIUM: 135 MMOL/L (ref 136–145)
ISTAT SODIUM: 138 MMOL/L (ref 136–145)
P AXIS: 70 DEGREES
P-R INTERVAL: 208 MS
PSA SERPL DL<=0.01 NG/ML-MCNC: 16.8 SECONDS (ref 12.4–14.6)
Q-T INTERVAL: 394 MS
QRS DURATION: 116 MS
QTC CALCULATION (BEZET): 457 MS
R AXIS: 73 DEGREES
T AXIS: 20 DEGREES
VENTRICULAR RATE: 81 BPM

## 2020-07-16 PROCEDURE — 99232 SBSQ HOSP IP/OBS MODERATE 35: CPT | Performed by: NURSE PRACTITIONER

## 2020-07-16 PROCEDURE — 047C3DZ DILATION OF RIGHT COMMON ILIAC ARTERY WITH INTRALUMINAL DEVICE, PERCUTANEOUS APPROACH: ICD-10-PCS | Performed by: SURGERY

## 2020-07-16 PROCEDURE — 99222 1ST HOSP IP/OBS MODERATE 55: CPT | Performed by: HOSPITALIST

## 2020-07-16 PROCEDURE — 047D3DZ DILATION OF LEFT COMMON ILIAC ARTERY WITH INTRALUMINAL DEVICE, PERCUTANEOUS APPROACH: ICD-10-PCS | Performed by: SURGERY

## 2020-07-16 PROCEDURE — 37221 REVASCULARIZATION ENDOVASC ILIAC ARTERY UNILAT W STENT INC ANGIOP: CPT | Performed by: INTERNAL MEDICINE

## 2020-07-16 PROCEDURE — 75716 ARTERY X-RAYS ARMS/LEGS: CPT | Performed by: INTERNAL MEDICINE

## 2020-07-16 PROCEDURE — 76942 ECHO GUIDE FOR BIOPSY: CPT | Performed by: ANESTHESIOLOGY

## 2020-07-16 PROCEDURE — B41D1ZZ FLUOROSCOPY OF AORTA AND BILATERAL LOWER EXTREMITY ARTERIES USING LOW OSMOLAR CONTRAST: ICD-10-PCS | Performed by: SURGERY

## 2020-07-16 PROCEDURE — 6A751Z6 ULTRASOUND THERAPY OF PERIPHERAL VESSELS, MULTIPLE: ICD-10-PCS | Performed by: SURGERY

## 2020-07-16 PROCEDURE — 75625 CONTRAST EXAM ABDOMINL AORTA: CPT | Performed by: INTERNAL MEDICINE

## 2020-07-16 RX ORDER — ONDANSETRON 2 MG/ML
INJECTION INTRAMUSCULAR; INTRAVENOUS AS NEEDED
Status: DISCONTINUED | OUTPATIENT
Start: 2020-07-16 | End: 2020-07-16 | Stop reason: SURG

## 2020-07-16 RX ORDER — LIDOCAINE HYDROCHLORIDE 10 MG/ML
INJECTION, SOLUTION EPIDURAL; INFILTRATION; INTRACAUDAL; PERINEURAL AS NEEDED
Status: DISCONTINUED | OUTPATIENT
Start: 2020-07-16 | End: 2020-07-16 | Stop reason: SURG

## 2020-07-16 RX ORDER — CEFAZOLIN SODIUM/WATER 2 G/20 ML
2 SYRINGE (ML) INTRAVENOUS EVERY 8 HOURS
Status: COMPLETED | OUTPATIENT
Start: 2020-07-16 | End: 2020-07-17

## 2020-07-16 RX ORDER — EZETIMIBE 10 MG/1
10 TABLET ORAL NIGHTLY
Status: DISCONTINUED | OUTPATIENT
Start: 2020-07-16 | End: 2020-07-17

## 2020-07-16 RX ORDER — TAMSULOSIN HYDROCHLORIDE 0.4 MG/1
0.4 CAPSULE ORAL 2 TIMES DAILY
Status: DISCONTINUED | OUTPATIENT
Start: 2020-07-16 | End: 2020-07-17

## 2020-07-16 RX ORDER — METOCLOPRAMIDE HYDROCHLORIDE 5 MG/ML
10 INJECTION INTRAMUSCULAR; INTRAVENOUS AS NEEDED
Status: ACTIVE | OUTPATIENT
Start: 2020-07-16 | End: 2020-07-16

## 2020-07-16 RX ORDER — SODIUM CHLORIDE 9 MG/ML
INJECTION, SOLUTION INTRAVENOUS CONTINUOUS PRN
Status: DISCONTINUED | OUTPATIENT
Start: 2020-07-16 | End: 2020-07-16 | Stop reason: SURG

## 2020-07-16 RX ORDER — HYDROCODONE BITARTRATE AND ACETAMINOPHEN 5; 325 MG/1; MG/1
2 TABLET ORAL EVERY 4 HOURS PRN
Status: DISCONTINUED | OUTPATIENT
Start: 2020-07-16 | End: 2020-07-17

## 2020-07-16 RX ORDER — ACETAMINOPHEN 500 MG
500 TABLET ORAL EVERY 6 HOURS PRN
Status: DISCONTINUED | OUTPATIENT
Start: 2020-07-16 | End: 2020-07-17

## 2020-07-16 RX ORDER — HYDROCODONE BITARTRATE AND ACETAMINOPHEN 5; 325 MG/1; MG/1
1 TABLET ORAL EVERY 4 HOURS PRN
Status: DISCONTINUED | OUTPATIENT
Start: 2020-07-16 | End: 2020-07-17

## 2020-07-16 RX ORDER — DEXTROSE MONOHYDRATE 25 G/50ML
50 INJECTION, SOLUTION INTRAVENOUS
Status: DISCONTINUED | OUTPATIENT
Start: 2020-07-16 | End: 2020-07-17

## 2020-07-16 RX ORDER — ONDANSETRON 2 MG/ML
4 INJECTION INTRAMUSCULAR; INTRAVENOUS AS NEEDED
Status: ACTIVE | OUTPATIENT
Start: 2020-07-16 | End: 2020-07-16

## 2020-07-16 RX ORDER — HYDROMORPHONE HYDROCHLORIDE 1 MG/ML
0.4 INJECTION, SOLUTION INTRAMUSCULAR; INTRAVENOUS; SUBCUTANEOUS EVERY 5 MIN PRN
Status: DISCONTINUED | OUTPATIENT
Start: 2020-07-16 | End: 2020-07-16

## 2020-07-16 RX ORDER — DIPHENHYDRAMINE HYDROCHLORIDE 50 MG/ML
INJECTION INTRAMUSCULAR; INTRAVENOUS
Status: COMPLETED
Start: 2020-07-16 | End: 2020-07-16

## 2020-07-16 RX ORDER — METOPROLOL SUCCINATE 50 MG/1
50 TABLET, EXTENDED RELEASE ORAL DAILY
Status: DISCONTINUED | OUTPATIENT
Start: 2020-07-17 | End: 2020-07-17

## 2020-07-16 RX ORDER — DIPHENHYDRAMINE HYDROCHLORIDE 50 MG/ML
12.5 INJECTION INTRAMUSCULAR; INTRAVENOUS AS NEEDED
Status: ACTIVE | OUTPATIENT
Start: 2020-07-16 | End: 2020-07-16

## 2020-07-16 RX ORDER — ROSUVASTATIN CALCIUM 20 MG/1
40 TABLET, COATED ORAL NIGHTLY
Status: DISCONTINUED | OUTPATIENT
Start: 2020-07-16 | End: 2020-07-17

## 2020-07-16 RX ORDER — MEPERIDINE HYDROCHLORIDE 25 MG/ML
12.5 INJECTION INTRAMUSCULAR; INTRAVENOUS; SUBCUTANEOUS AS NEEDED
Status: ACTIVE | OUTPATIENT
Start: 2020-07-16 | End: 2020-07-16

## 2020-07-16 RX ORDER — HEPARIN SODIUM 5000 [USP'U]/ML
INJECTION, SOLUTION INTRAVENOUS; SUBCUTANEOUS AS NEEDED
Status: DISCONTINUED | OUTPATIENT
Start: 2020-07-16 | End: 2020-07-16 | Stop reason: SURG

## 2020-07-16 RX ORDER — MIDAZOLAM HYDROCHLORIDE 1 MG/ML
INJECTION INTRAMUSCULAR; INTRAVENOUS AS NEEDED
Status: DISCONTINUED | OUTPATIENT
Start: 2020-07-16 | End: 2020-07-16 | Stop reason: SURG

## 2020-07-16 RX ORDER — SODIUM CHLORIDE, SODIUM LACTATE, POTASSIUM CHLORIDE, CALCIUM CHLORIDE 600; 310; 30; 20 MG/100ML; MG/100ML; MG/100ML; MG/100ML
INJECTION, SOLUTION INTRAVENOUS CONTINUOUS
Status: DISCONTINUED | OUTPATIENT
Start: 2020-07-16 | End: 2020-07-17

## 2020-07-16 RX ORDER — SODIUM CHLORIDE 9 MG/ML
INJECTION, SOLUTION INTRAVENOUS CONTINUOUS
Status: DISCONTINUED | OUTPATIENT
Start: 2020-07-16 | End: 2020-07-17

## 2020-07-16 RX ORDER — ROCURONIUM BROMIDE 10 MG/ML
INJECTION, SOLUTION INTRAVENOUS AS NEEDED
Status: DISCONTINUED | OUTPATIENT
Start: 2020-07-16 | End: 2020-07-16 | Stop reason: SURG

## 2020-07-16 RX ORDER — NALOXONE HYDROCHLORIDE 0.4 MG/ML
80 INJECTION, SOLUTION INTRAMUSCULAR; INTRAVENOUS; SUBCUTANEOUS AS NEEDED
Status: ACTIVE | OUTPATIENT
Start: 2020-07-16 | End: 2020-07-16

## 2020-07-16 RX ORDER — AMLODIPINE BESYLATE 5 MG/1
5 TABLET ORAL DAILY
Status: DISCONTINUED | OUTPATIENT
Start: 2020-07-16 | End: 2020-07-17

## 2020-07-16 RX ORDER — CLOPIDOGREL BISULFATE 75 MG/1
75 TABLET ORAL DAILY
Status: DISCONTINUED | OUTPATIENT
Start: 2020-07-16 | End: 2020-07-17

## 2020-07-16 RX ORDER — PANTOPRAZOLE SODIUM 40 MG/1
40 TABLET, DELAYED RELEASE ORAL
Status: DISCONTINUED | OUTPATIENT
Start: 2020-07-17 | End: 2020-07-17

## 2020-07-16 RX ORDER — PREDNISONE 50 MG/1
50 TABLET ORAL SEE ADMIN INSTRUCTIONS
Status: ON HOLD | COMMUNITY
End: 2020-07-17

## 2020-07-16 RX ORDER — LISINOPRIL 5 MG/1
5 TABLET ORAL DAILY
Status: DISCONTINUED | OUTPATIENT
Start: 2020-07-16 | End: 2020-07-17

## 2020-07-16 RX ORDER — DIPHENHYDRAMINE HYDROCHLORIDE 50 MG/ML
50 INJECTION INTRAMUSCULAR; INTRAVENOUS ONCE
Status: COMPLETED | OUTPATIENT
Start: 2020-07-16 | End: 2020-07-16

## 2020-07-16 RX ORDER — ACETAMINOPHEN 325 MG/1
650 TABLET ORAL EVERY 4 HOURS PRN
Status: DISCONTINUED | OUTPATIENT
Start: 2020-07-16 | End: 2020-07-17

## 2020-07-16 RX ORDER — ONDANSETRON 2 MG/ML
4 INJECTION INTRAMUSCULAR; INTRAVENOUS EVERY 6 HOURS PRN
Status: DISCONTINUED | OUTPATIENT
Start: 2020-07-16 | End: 2020-07-17

## 2020-07-16 RX ORDER — ASPIRIN 81 MG/1
81 TABLET ORAL DAILY
Status: DISCONTINUED | OUTPATIENT
Start: 2020-07-16 | End: 2020-07-17

## 2020-07-16 RX ADMIN — ROCURONIUM BROMIDE 20 MG: 10 INJECTION, SOLUTION INTRAVENOUS at 14:04:00

## 2020-07-16 RX ADMIN — DIPHENHYDRAMINE HYDROCHLORIDE 50 MG: 50 INJECTION INTRAMUSCULAR; INTRAVENOUS at 12:17:00

## 2020-07-16 RX ADMIN — SODIUM CHLORIDE: 9 INJECTION, SOLUTION INTRAVENOUS at 14:52:00

## 2020-07-16 RX ADMIN — ROCURONIUM BROMIDE 80 MG: 10 INJECTION, SOLUTION INTRAVENOUS at 12:20:00

## 2020-07-16 RX ADMIN — HEPARIN SODIUM 15000 UNITS: 5000 INJECTION, SOLUTION INTRAVENOUS; SUBCUTANEOUS at 13:15:00

## 2020-07-16 RX ADMIN — SODIUM CHLORIDE: 9 INJECTION, SOLUTION INTRAVENOUS at 12:15:00

## 2020-07-16 RX ADMIN — ONDANSETRON 4 MG: 2 INJECTION INTRAMUSCULAR; INTRAVENOUS at 14:26:00

## 2020-07-16 RX ADMIN — HEPARIN SODIUM 200 UNITS: 5000 INJECTION, SOLUTION INTRAVENOUS; SUBCUTANEOUS at 14:05:00

## 2020-07-16 RX ADMIN — LIDOCAINE HYDROCHLORIDE 50 MG: 10 INJECTION, SOLUTION EPIDURAL; INFILTRATION; INTRACAUDAL; PERINEURAL at 12:20:00

## 2020-07-16 RX ADMIN — MIDAZOLAM HYDROCHLORIDE 2 MG: 1 INJECTION INTRAMUSCULAR; INTRAVENOUS at 12:17:00

## 2020-07-16 NOTE — ANESTHESIA PROCEDURE NOTES
Peripheral IV  Date/Time: 7/16/2020 12:40 PM  Inserted by: Surjit Ayala MD    Placement  Needle size: 16 G  Laterality: left  Location: hand  Local anesthetic: none  Site prep: alcohol  Technique: anatomical landmarks  Attempts: 1

## 2020-07-16 NOTE — OPERATIVE REPORT
Pre-opDx: AAA/ Bilatweral iliac ectasia/ severe stenosis. Post-Op Dx: same. Procedure: PercutaneousR/L Diplex US guided puncture femoral arteries. 7x60mm Shockwave PTA- 6 times left RYANNE/ 4times Right RYANNE. 10x59 VBX stent grafts right/ left RYANNE.  Co-surgeons

## 2020-07-16 NOTE — ANESTHESIA POSTPROCEDURE EVALUATION
3 [facility] Patient Status:  Inpatient   Age/Gender 79year old male MRN [id number]   [facility] 6NE-A Attending Rosa France MD   Hosp Day # 0 PCP Yomi Velásquez DO       Anesthesia Post-op Note    Procedure(s):  Bilat

## 2020-07-16 NOTE — ANESTHESIA PREPROCEDURE EVALUATION
PRE-OP EVALUATION    Patient Name: Celeste Acevedo    Pre-op Diagnosis: claudication    Procedure(s):   Aortic angiogram, left iliac percutaneous transluminal angioplasty/stent, possible open cutdown of left femoral artery  lex OLIVARES    Surgeon(s) and Role: (-) COPD       (+) shortness of breath (after walking 50ft)     (+) sleep apnea and CPAP      Neuro/Psych    Negative neuro/psych ROS.                           As per Epic:  Patient Active Problem List:     Erectile dysfunction     BPH (benign prostatic hy • OTHER SURGICAL HISTORY  2014    passed kidney stone via stent   • TONSILLECTOMY       Social History    Tobacco Use      Smoking status: Former Smoker        Packs/day: 1.00        Years: 40.00        Pack years: 36        Start date: 1/27/1967        Jacquelin Arizmendi Comment: GETA discussed in detail. Risk of head ache, sore throat, cough, dental trauma, PONV, bronchospasm, aspiration, heart attack, stroke, and death discussed.   All questions answered    Plan/risks discussed with: patient and spouse                Pre

## 2020-07-16 NOTE — H&P
[facility]    PATIENT'S NAME: Brenda Kaiser   ATTENDING PHYSICIAN: Sukumar Winston M.D.    PATIENT ACCOUNT#:   [de-identified]    LOCATION:    MEDICAL RECORD #:   [id number]       YOB: 1950  ADMISSION DATE:       07/16/2020    HISTORY AND PHY dysuria, hemoptysis, cough, sputum production, weight loss. No fever, chills. No hematemesis. No bright red blood per rectum. PHYSICAL EXAMINATION:    GENERAL:  He is awake, alert, appropriate. No acute distress.   VITAL SIGNS:  Blood pressure was [address] [address] [id number]  [facility]/

## 2020-07-16 NOTE — HISTORICAL OFFICE NOTE
Progress Notes  - documented in this encounter  Adrianna Pearce MD - 2020 3:30 PM CDT  Formatting of this note might be different from the original.    [address] Note    Omid Piña  : 1950  PCP: Crow Levin, May 2003   • Coronary artery bypass graft   3 vessel CABG,   • Ureteral stent placement   2015     Family Hx:    Family History   Problem Relation Age of Onset   • Patient is unaware of any medical problems Mother   • Coronary Artery Disease Father   • A except for what's indicated in HPI   Respiratory: Negative for cough and hemoptysis. Hematologic/Lymphatic: Does not bruise/bleed easily. Skin: Negative for rash and suspicious lesions. Musculoskeletal: Negative for arthritis.    Gastrointestinal: Neg artery. Less than 50% stenosis of the right common and right external iliac arteries. Impression:    1. Pre-op evaluation   2. CAD in native artery   3. Hx of CABG   4. Hyperlipidemia, mixed   5. Hyperglycemia   6.  Abdominal aortic aneurysm (AAA) withou

## 2020-07-16 NOTE — ANESTHESIA PROCEDURE NOTES
Airway  Date/Time: 7/16/2020 12:21 PM  Urgency: elective      General Information and Staff    Patient location during procedure: OR  Anesthesiologist: Kristina Posadas MD  Performed: anesthesiologist     Indications and Patient Condition  Indications

## 2020-07-16 NOTE — CONSULTS
[facility]  Report of Consultation    Mike Richar Patient Status:  Inpatient    1950 MRN [id number]   [facility] 6NE-A Attending Rosendo Diaz MD   Hosp Day # 0 PCP Soila Guerrero DO     See Dr Shaggy Huizar comprehensive hp/office no on crestor 40/zetia 10  5. Obesity/loretta  6. DM2  7. BPH    Plan:     Resume baseline cardiac regimen  Ekg now please    Discussed plan of care with patient and nursing.        SAPNA Sharif  7/16/2020  3:14 PM

## 2020-07-16 NOTE — CONSULTS
[address] Patient Status:  Inpatient    1950 MRN [id number]   [facility] 6NE-A Attending Radames Salcedo MD   Hosp Day # 0 PCP Rei Palumbo DO     Reason for consult: Diabetes mellitus    Requeste uncontrolled    • Visual impairment     wears glasses   • Wears glasses         Past Surgical History:   Past Surgical History:   Procedure Laterality Date   • ANGIOGRAM     • CABG  2003    X 4 vessels   • CATARACT  2018    Right eye-Dr. Peri Bosworth   • CAESAR total) by mouth once daily, 30 minutes prior to breakfast., Disp: 90 tablet, Rfl: 3  tamsulosin HCl 0.4 MG Oral Cap, 0.4 mg 2 (two) times daily. , Disp: , Rfl:   psyllium 28 % Oral Powd Pack, Take 1 packet by mouth daily.   , Disp: , Rfl:   Rosuvastatin Ca rashes or lesions. Psychiatric: Appropriate mood and affect.     Diagnostic Data:      Labs:  Recent Labs   Lab 07/16/20  1508   INR 1.32*       No results for input(s): GLU, BUN, CREATSERUM, GFRAA, GFRNAA, CA, ALB, NA, K, CL, CO2, ALKPHO, AST, ALT, BILT,

## 2020-07-16 NOTE — ANESTHESIA PROCEDURE NOTES
Arterial Line  Performed by: Salvatore Go MD  Authorized by: Salvatore Go MD     General Information and Staff    Procedure Start:   Procedure End: 7/16/2020 12:35 PM  Anesthesiologist:  Salvatore Go MD  Performed By:  Mattie Servin

## 2020-07-17 VITALS
HEIGHT: 73 IN | OXYGEN SATURATION: 93 % | SYSTOLIC BLOOD PRESSURE: 119 MMHG | RESPIRATION RATE: 18 BRPM | HEART RATE: 72 BPM | BODY MASS INDEX: 38.51 KG/M2 | WEIGHT: 290.56 LBS | DIASTOLIC BLOOD PRESSURE: 69 MMHG | TEMPERATURE: 99 F

## 2020-07-17 PROBLEM — I70.219 ATHEROSCLER NATIVE ARTERIES THE EXTREMITIES W/INTERMIT CLAUDICATION: Status: ACTIVE | Noted: 2020-06-24

## 2020-07-17 PROBLEM — I70.219 ATHEROSCLER NATIVE ARTERIES THE EXTREMITIES W/INTERMIT CLAUDICATION (HCC): Status: ACTIVE | Noted: 2020-06-24

## 2020-07-17 LAB
ALBUMIN SERPL-MCNC: 3.1 G/DL (ref 3.4–5)
ALBUMIN/GLOB SERPL: 1 {RATIO} (ref 1–2)
ALP LIVER SERPL-CCNC: 46 U/L (ref 45–117)
ALT SERPL-CCNC: 75 U/L (ref 16–61)
ANION GAP SERPL CALC-SCNC: 1 MMOL/L (ref 0–18)
AST SERPL-CCNC: 62 U/L (ref 15–37)
BASOPHILS # BLD AUTO: 0.03 X10(3) UL (ref 0–0.2)
BASOPHILS NFR BLD AUTO: 0.2 %
BILIRUB SERPL-MCNC: 0.4 MG/DL (ref 0.1–2)
BUN BLD-MCNC: 21 MG/DL (ref 7–18)
BUN/CREAT SERPL: 19.3 (ref 10–20)
CALCIUM BLD-MCNC: 8.4 MG/DL (ref 8.5–10.1)
CHLORIDE SERPL-SCNC: 108 MMOL/L (ref 98–112)
CO2 SERPL-SCNC: 29 MMOL/L (ref 21–32)
CREAT BLD-MCNC: 1.09 MG/DL (ref 0.7–1.3)
DEPRECATED RDW RBC AUTO: 43 FL (ref 35.1–46.3)
EOSINOPHIL # BLD AUTO: 0.02 X10(3) UL (ref 0–0.7)
EOSINOPHIL NFR BLD AUTO: 0.2 %
ERYTHROCYTE [DISTWIDTH] IN BLOOD BY AUTOMATED COUNT: 12.8 % (ref 11–15)
EST. AVERAGE GLUCOSE BLD GHB EST-MCNC: 140 MG/DL (ref 68–126)
GLOBULIN PLAS-MCNC: 3 G/DL (ref 2.8–4.4)
GLUCOSE BLD-MCNC: 123 MG/DL (ref 70–99)
GLUCOSE BLD-MCNC: 142 MG/DL (ref 70–99)
HBA1C MFR BLD HPLC: 6.5 % (ref ?–5.7)
HCT VFR BLD AUTO: 43 % (ref 39–53)
HGB BLD-MCNC: 13.9 G/DL (ref 13–17.5)
IMM GRANULOCYTES # BLD AUTO: 0.04 X10(3) UL (ref 0–1)
IMM GRANULOCYTES NFR BLD: 0.3 %
LYMPHOCYTES # BLD AUTO: 2.56 X10(3) UL (ref 1–4)
LYMPHOCYTES NFR BLD AUTO: 19.7 %
M PROTEIN MFR SERPL ELPH: 6.1 G/DL (ref 6.4–8.2)
MCH RBC QN AUTO: 29.7 PG (ref 26–34)
MCHC RBC AUTO-ENTMCNC: 32.3 G/DL (ref 31–37)
MCV RBC AUTO: 91.9 FL (ref 80–100)
MONOCYTES # BLD AUTO: 1.33 X10(3) UL (ref 0.1–1)
MONOCYTES NFR BLD AUTO: 10.3 %
NEUTROPHILS # BLD AUTO: 8.99 X10 (3) UL (ref 1.5–7.7)
NEUTROPHILS # BLD AUTO: 8.99 X10(3) UL (ref 1.5–7.7)
NEUTROPHILS NFR BLD AUTO: 69.3 %
OSMOLALITY SERPL CALC.SUM OF ELEC: 291 MOSM/KG (ref 275–295)
PLATELET # BLD AUTO: 203 10(3)UL (ref 150–450)
POTASSIUM SERPL-SCNC: 4.2 MMOL/L (ref 3.5–5.1)
RBC # BLD AUTO: 4.68 X10(6)UL (ref 3.8–5.8)
SODIUM SERPL-SCNC: 138 MMOL/L (ref 136–145)
WBC # BLD AUTO: 13 X10(3) UL (ref 4–11)

## 2020-07-17 PROCEDURE — 99231 SBSQ HOSP IP/OBS SF/LOW 25: CPT | Performed by: HOSPITALIST

## 2020-07-17 PROCEDURE — 99232 SBSQ HOSP IP/OBS MODERATE 35: CPT | Performed by: INTERNAL MEDICINE

## 2020-07-17 NOTE — PROCEDURES
[facility]    PATIENT'S NAME: Tushar Calderon   ATTENDING PHYSICIAN: Rasheeda Wilcox M.D. OPERATING PHYSICIAN: Ole Hills M.D.    PATIENT ACCOUNT#:   [de-identified]    LOCATION:  [address] [place] [address]  MEDICAL RECORD #:   [id number]       DATE OF BIRTH: an 80% to 90% heavily calcified stenosis. External iliac is patent. On the left, there is a calcific stenosis of about 50% in the distal common iliac. There was slow flow noted in the internal iliac on the left.      Next, a 7 x 60 mm Shockwave balloon w stent grafts as noted.     Dictated By Volodymyr Luna M.D.  d: 07/16/2020 14:31:39  t: 07/16/2020 18:53:03  [facility] [id number]  NR/

## 2020-07-17 NOTE — PLAN OF CARE
Assumed care of patient at 0730. Patient A/Ox4 and neurologically intact. VSS, NSR on the monitor. Breath sounds diminished on RA. IS encouraged. Pulses palpable in BLE. Voiding in the bathroom.  Reports mild pain in the groin that improved once dressing wa orders  - Initiate isolation precautions as appropriate  - Initiate Pressure Ulcer prevention bundle as indicated  Outcome: Adequate for Discharge     Problem: HEMATOLOGIC - ADULT  Goal: Free from bleeding injury  Description  (Example usage: patient with

## 2020-07-17 NOTE — PLAN OF CARE
Received pt at 1930. Pt alert and oriented x4, denies any sensation difference in BABATUNDE LE. Pt on 2L NC while awake and CPAP while asleep, lungs clear/diminished, infrequent non-productive cough present. Pt in NSR with occasional PVCs, VSS.  BABATUNDE groins are so

## 2020-07-17 NOTE — OPERATIVE REPORT
[facility]    PATIENT'S NAME: Ap Martínez   ATTENDING PHYSICIAN: León Coates M.D. OPERATING PHYSICIAN: León Coates M.D.    PATIENT ACCOUNT#:   [de-identified]    LOCATION:  [address] [place] [address]  MEDICAL RECORD #:   [id number]       DATE OF BIRTH:  [birth year] Bender catheter placed. He had the lower chest, abdomen, both groins, prepped and draped in usual sterile technique. Ioban Vi-Drape used to cover the operative field.   The patient had duplex ultrasound guidance, after time-out was done, puncture of the ri improved flow through the internal iliac artery bilaterally. There was a high-grade stenosis in the right internal iliac artery's origin. Left internal carotid appeared to flow better, and good flow where the puncture sites were on both sides.   At the co

## 2020-07-17 NOTE — PROGRESS NOTES
[facility]  Progress Note     Julio Cesar Olgas Patient Status:  Inpatient    1950 MRN [id number]   [facility] 6NE-A Attending Rosalinda Cohn MD   [address] Day # 1 PCP Maryse Pal DO     Chief Complaint: PVD    S: Patient denies new Insulin Aspart Pen  1-10 Units Subcutaneous TID AC and HS   • amLODIPine Besylate  5 mg Oral Daily   • lisinopril  5 mg Oral Daily   • ezetimibe  10 mg Oral Nightly   • Metoprolol Succinate ER  50 mg Oral Daily   • Rosuvastatin Calcium  40 mg Oral Nightly

## 2020-07-17 NOTE — PROGRESS NOTES
No complaints/ improved flow right/left lower legs. Wounds clean/dry. Neurologically intact.  Instructed wound care/ activity/ follow up

## 2020-07-17 NOTE — PROGRESS NOTES
[facility]  Progress Note    Conner Forrest Patient Status:  Inpatient    1950 MRN [id number]   [facility] 6NE-A Attending John Coleman MD   [facility] Day # 1 PCP Yolanda Luna DO     Subjective:  POD #1 complex hybrid bilateral L ([facility] [address]    S/P CABG (coronary artery bypass graft)    AAA (abdominal aortic aneurysm) (HCC)    Diabetes mellitus (HCC)    Severe obesity (BMI 35.0-39. 9) with comorbidity (HCC)    KRISTEN on CPAP    Essential hypertension      Doing well from cardiac standpoint.

## 2020-07-17 NOTE — PHYSICAL THERAPY NOTE
PHYSICAL THERAPY QUICK EVALUATION - INPATIENT    Room Number: 6266/8477-C  Evaluation Date: 7/17/2020  Presenting Problem: S/p AAA repair and B le revascularization on 7/16  Physician Order: PT Eval and Treat    History Related to Current Admission:Pt s/ with exertion   • Sleep apnea     cpap   • Type II or unspecified type diabetes mellitus without mention of complication, not stated as uncontrolled    • Visual impairment     wears glasses   • Wears glasses        Past Surgical History  Past Surgical Hist extremity strength is within functional limits       ACTIVITY TOLERANCE  Pulse: 77  Heart Rate Source: Monitor  Resp: (!) 27                O2 WALK  SPO2 on Room Air at Rest: 96  SPO2 Ambulation on Room Air: 88            AM-PAC '6-Clicks' INPATIENT SHORT 4  4.  object from the floor from a standing position           0  5. Turning to look over left and right shoulders while standing   4  6. Standing unsupported one foot in front                                   4  7.  Standing on one leg

## 2020-07-18 LAB — BLOOD TYPE BARCODE: 7300

## 2020-07-20 ENCOUNTER — TELEPHONE (OUTPATIENT)
Dept: CARDIOLOGY | Age: 70
End: 2020-07-20

## 2020-07-20 ENCOUNTER — PATIENT OUTREACH (OUTPATIENT)
Dept: CASE MANAGEMENT | Age: 70
End: 2020-07-20

## 2020-07-20 ENCOUNTER — TELEPHONE (OUTPATIENT)
Dept: FAMILY MEDICINE CLINIC | Facility: CLINIC | Age: 70
End: 2020-07-20

## 2020-07-20 DIAGNOSIS — Z95.1 S/P CABG (CORONARY ARTERY BYPASS GRAFT): ICD-10-CM

## 2020-07-20 DIAGNOSIS — E11.51 DM (DIABETES MELLITUS) TYPE II, CONTROLLED, WITH PERIPHERAL VASCULAR DISORDER (HCC): ICD-10-CM

## 2020-07-20 DIAGNOSIS — Z02.9 ENCOUNTERS FOR UNSPECIFIED ADMINISTRATIVE PURPOSE: ICD-10-CM

## 2020-07-20 DIAGNOSIS — I70.213 ATHEROSCLEROSIS OF NATIVE ARTERY OF BOTH LOWER EXTREMITIES WITH INTERMITTENT CLAUDICATION (HCC): ICD-10-CM

## 2020-07-20 DIAGNOSIS — I10 ESSENTIAL HYPERTENSION: ICD-10-CM

## 2020-07-20 PROCEDURE — 1111F DSCHRG MED/CURRENT MED MERGE: CPT

## 2020-07-20 NOTE — PAYOR COMM NOTE
--------------  DISCHARGE REVIEW    RichardorSari Bonner [place] [facility]  Subscriber #:  [id number]  Authorization Number: [id number]    Admit date: 7/16/20  Admit time:  7823  Discharge Date: 7/17/2020 12:01 PM     Admitting Physician: Noemi Montgomery MD  Attending Physici

## 2020-07-20 NOTE — DISCHARGE SUMMARY
[facility]    PATIENT'S NAME: Elpidio Deion   ATTENDING PHYSICIAN: Randi Lozoya M.D.    PATIENT ACCOUNT#:   [de-identified]    LOCATION:  [address] [place] [address]  MEDICAL RECORD #:   [id number]       YOB: 1950  ADMISSION DATE:       07/16/2020

## 2020-07-20 NOTE — TELEPHONE ENCOUNTER
Patient discharged home from [facility] on 7/17/2020 and is at Moderate risk for readmission and recommended to have a TCM HFU by 7/31/2020 or sooner.  NCM attempted to schedule a TCM HFU patient states that he has an appointment on 9/15/2020 with his

## 2020-07-20 NOTE — PROGRESS NOTES
Initial Post Discharge Follow Up   Discharge Date: 7/17/20  Contact Date: 7/20/2020    Consent Verification:  Assessment Completed With: Patient  HIPAA Verified? Yes    Discharge Dx:     Atherosclerosis native arteries the extremities w/intermit claudic yes  • When you were leaving the hospital were your discharge instructions reviewed with you? yes  • How well were your discharge instructions explained to you?   o On a scale of 1-5   1- Very Poor and 5- Very well   o Very well  • Do you have any question Tab Take 40 mg by mouth nightly. • Clopidogrel Bisulfate 75 MG Oral Tab Take 1 tablet (75 mg total) by mouth daily. 30 tablet 0   • acetaminophen (TYLENOL EXTRA STRENGTH) 500 MG Oral Tab Take 500 mg by mouth every 6 (six) hours as needed for Pain. [address] [id number] [phone] Kal Clarke  [facility] - [address] [facility] Hattie Baeza  [address] [phone]          PCP TCM/HFU appointment:

## 2020-07-21 NOTE — TELEPHONE ENCOUNTER
Support TCM-all other however this was a 23-hour procedure and if patient is feeling well and follow-up as scheduled with Dr. Odette Vargas May may keep September appointment

## 2020-07-21 NOTE — TELEPHONE ENCOUNTER
LOV 7/1/2020    Your appointments     Date & Time Appointment Department [facility])    Sep 15, 2020 11:00 AM CDT Follow Up Visit with Kash Ahmadi  [facility] , [address], [address] [facility] [address])        Oct 19, 2020 11:00 AM CDT Follow

## 2020-07-21 NOTE — TELEPHONE ENCOUNTER
Spoke with patient and he is following up with Dr Mushtaq Corcoran in the next 2 weeks  Patient reports groin site looks good.  Denies oozing at the site  Denies pain  Reports he went on small walk this am and did not have pain, just a little fatigue  Advised him to c

## 2020-07-27 RX ORDER — ROSUVASTATIN CALCIUM 40 MG/1
TABLET, COATED ORAL
Qty: 90 TABLET | Refills: 3 | Status: SHIPPED | OUTPATIENT
Start: 2020-07-27

## 2020-08-03 RX ORDER — CLOPIDOGREL BISULFATE 75 MG/1
TABLET ORAL
Qty: 90 TABLET | Refills: 3 | Status: SHIPPED | OUTPATIENT
Start: 2020-08-03

## 2020-08-03 RX ORDER — LISINOPRIL 5 MG/1
TABLET ORAL
Qty: 90 TABLET | Refills: 3 | Status: SHIPPED | OUTPATIENT
Start: 2020-08-03

## 2020-08-06 DIAGNOSIS — I73.9 CLAUDICATION (CMD): Primary | ICD-10-CM

## 2020-08-06 DIAGNOSIS — Z98.62 S/P PERCUTANEOUS TRANSLUMINAL ANGIOPLASTY (PTA): ICD-10-CM

## 2020-08-12 ENCOUNTER — OFFICE VISIT (OUTPATIENT)
Dept: CARDIOLOGY | Age: 70
End: 2020-08-12

## 2020-08-12 VITALS
BODY MASS INDEX: 39.1 KG/M2 | WEIGHT: 295 LBS | SYSTOLIC BLOOD PRESSURE: 116 MMHG | DIASTOLIC BLOOD PRESSURE: 60 MMHG | HEIGHT: 73 IN | HEART RATE: 62 BPM

## 2020-08-12 DIAGNOSIS — Z95.828 STATUS POST INSERTION OF ILIAC ARTERY STENT: ICD-10-CM

## 2020-08-12 DIAGNOSIS — G47.33 OBSTRUCTIVE SLEEP APNEA SYNDROME: ICD-10-CM

## 2020-08-12 DIAGNOSIS — I71.40 ABDOMINAL AORTIC ANEURYSM (AAA) WITHOUT RUPTURE (CMD): ICD-10-CM

## 2020-08-12 DIAGNOSIS — E78.2 HYPERLIPIDEMIA, MIXED: ICD-10-CM

## 2020-08-12 DIAGNOSIS — I70.202 ATHEROSCLEROSIS OF ARTERY OF LEFT LOWER EXTREMITY (CMD): Primary | Chronic | ICD-10-CM

## 2020-08-12 DIAGNOSIS — R73.9 HYPERGLYCEMIA: Chronic | ICD-10-CM

## 2020-08-12 DIAGNOSIS — Z95.1 HX OF CABG: ICD-10-CM

## 2020-08-12 DIAGNOSIS — I25.10 CAD IN NATIVE ARTERY: ICD-10-CM

## 2020-08-12 PROCEDURE — 99214 OFFICE O/P EST MOD 30 MIN: CPT | Performed by: INTERNAL MEDICINE

## 2020-08-12 SDOH — SOCIAL STABILITY: SOCIAL NETWORK: ARE YOU MARRIED, WIDOWED, DIVORCED, SEPARATED, NEVER MARRIED, OR LIVING WITH A PARTNER?: MARRIED

## 2020-08-12 SDOH — HEALTH STABILITY: PHYSICAL HEALTH: ON AVERAGE, HOW MANY MINUTES DO YOU ENGAGE IN EXERCISE AT THIS LEVEL?: 20 MIN

## 2020-08-12 SDOH — HEALTH STABILITY: PHYSICAL HEALTH: ON AVERAGE, HOW MANY DAYS PER WEEK DO YOU ENGAGE IN MODERATE TO STRENUOUS EXERCISE (LIKE A BRISK WALK)?: 7 DAYS

## 2020-08-12 ASSESSMENT — PATIENT HEALTH QUESTIONNAIRE - PHQ9
1. LITTLE INTEREST OR PLEASURE IN DOING THINGS: NOT AT ALL
CLINICAL INTERPRETATION OF PHQ9 SCORE: NO FURTHER SCREENING NEEDED
SUM OF ALL RESPONSES TO PHQ9 QUESTIONS 1 AND 2: 0
CLINICAL INTERPRETATION OF PHQ2 SCORE: NO FURTHER SCREENING NEEDED
2. FEELING DOWN, DEPRESSED OR HOPELESS: NOT AT ALL
SUM OF ALL RESPONSES TO PHQ9 QUESTIONS 1 AND 2: 0

## 2020-08-12 ASSESSMENT — ENCOUNTER SYMPTOMS
WEIGHT GAIN: 0
WEIGHT LOSS: 0
COUGH: 0
HEMOPTYSIS: 0
SUSPICIOUS LESIONS: 0
BRUISES/BLEEDS EASILY: 0
CHILLS: 0
FEVER: 0
ALLERGIC/IMMUNOLOGIC COMMENTS: NO NEW FOOD ALLERGIES
HEMATOCHEZIA: 0

## 2020-08-20 ENCOUNTER — MED REC SCAN ONLY (OUTPATIENT)
Dept: FAMILY MEDICINE CLINIC | Facility: CLINIC | Age: 70
End: 2020-08-20

## 2020-08-31 ENCOUNTER — HOSPITAL ENCOUNTER (OUTPATIENT)
Dept: CARDIOLOGY CLINIC | Facility: HOSPITAL | Age: 70
Discharge: HOME | End: 2020-08-31
Attending: SURGERY
Payer: MEDICARE

## 2020-08-31 DIAGNOSIS — I73.9 CLAUDICATION (HCC): ICD-10-CM

## 2020-08-31 DIAGNOSIS — Z98.62 S/P PERCUTANEOUS TRANSLUMINAL ANGIOPLASTY (PTA): ICD-10-CM

## 2020-08-31 PROCEDURE — 93978 VASCULAR STUDY: CPT | Performed by: INTERNAL MEDICINE

## 2020-08-31 PROCEDURE — 93925 LOWER EXTREMITY STUDY: CPT | Performed by: INTERNAL MEDICINE

## 2020-09-02 DIAGNOSIS — I73.9 CLAUDICATION (CMD): ICD-10-CM

## 2020-09-02 DIAGNOSIS — Z98.62 S/P PERCUTANEOUS TRANSLUMINAL ANGIOPLASTY (PTA): ICD-10-CM

## 2020-09-02 PROCEDURE — X1094 US VASC AORTA DUPLEX COMPLETE: HCPCS | Performed by: SURGERY

## 2020-09-14 PROBLEM — Z95.828 STATUS POST INSERTION OF ILIAC ARTERY STENT: Status: ACTIVE | Noted: 2020-08-12

## 2020-09-15 ENCOUNTER — OFFICE VISIT (OUTPATIENT)
Dept: FAMILY MEDICINE CLINIC | Facility: CLINIC | Age: 70
End: 2020-09-15
Payer: MEDICARE

## 2020-09-15 VITALS
TEMPERATURE: 98 F | BODY MASS INDEX: 38.22 KG/M2 | WEIGHT: 288.38 LBS | RESPIRATION RATE: 18 BRPM | HEART RATE: 80 BPM | SYSTOLIC BLOOD PRESSURE: 121 MMHG | OXYGEN SATURATION: 94 % | HEIGHT: 73 IN | DIASTOLIC BLOOD PRESSURE: 65 MMHG

## 2020-09-15 DIAGNOSIS — M50.20 CERVICAL DISC HERNIATION: ICD-10-CM

## 2020-09-15 DIAGNOSIS — I10 ESSENTIAL HYPERTENSION: ICD-10-CM

## 2020-09-15 DIAGNOSIS — E11.51 DM (DIABETES MELLITUS) TYPE II, CONTROLLED, WITH PERIPHERAL VASCULAR DISORDER (HCC): Primary | ICD-10-CM

## 2020-09-15 DIAGNOSIS — M47.22 OSTEOARTHRITIS OF SPINE WITH RADICULOPATHY, CERVICAL REGION: ICD-10-CM

## 2020-09-15 DIAGNOSIS — R06.00 DOE (DYSPNEA ON EXERTION): ICD-10-CM

## 2020-09-15 DIAGNOSIS — E78.2 HYPERLIPIDEMIA, MIXED: ICD-10-CM

## 2020-09-15 DIAGNOSIS — I73.9 PAD (PERIPHERAL ARTERY DISEASE) (HCC): ICD-10-CM

## 2020-09-15 DIAGNOSIS — I25.118 CORONARY ARTERY DISEASE OF NATIVE ARTERY OF NATIVE HEART WITH STABLE ANGINA PECTORIS (HCC): ICD-10-CM

## 2020-09-15 DIAGNOSIS — Z23 NEED FOR VACCINATION: ICD-10-CM

## 2020-09-15 PROBLEM — R06.09 DOE (DYSPNEA ON EXERTION): Status: ACTIVE | Noted: 2020-09-15

## 2020-09-15 PROCEDURE — 3008F BODY MASS INDEX DOCD: CPT | Performed by: FAMILY MEDICINE

## 2020-09-15 PROCEDURE — G0008 ADMIN INFLUENZA VIRUS VAC: HCPCS | Performed by: FAMILY MEDICINE

## 2020-09-15 PROCEDURE — 3074F SYST BP LT 130 MM HG: CPT | Performed by: FAMILY MEDICINE

## 2020-09-15 PROCEDURE — 99214 OFFICE O/P EST MOD 30 MIN: CPT | Performed by: FAMILY MEDICINE

## 2020-09-15 PROCEDURE — 3078F DIAST BP <80 MM HG: CPT | Performed by: FAMILY MEDICINE

## 2020-09-15 PROCEDURE — 90662 IIV NO PRSV INCREASED AG IM: CPT | Performed by: FAMILY MEDICINE

## 2020-09-15 RX ORDER — METOPROLOL SUCCINATE 50 MG/1
50 TABLET, EXTENDED RELEASE ORAL DAILY
Qty: 90 TABLET | Refills: 0 | Status: SHIPPED | OUTPATIENT
Start: 2020-09-15 | End: 2021-02-08

## 2020-09-15 RX ORDER — METFORMIN HYDROCHLORIDE 500 MG/1
500 TABLET, EXTENDED RELEASE ORAL
Qty: 90 TABLET | Refills: 0 | Status: SHIPPED | OUTPATIENT
Start: 2020-09-15 | End: 2021-02-15

## 2020-09-15 RX ORDER — AMLODIPINE BESYLATE 5 MG/1
5 TABLET ORAL DAILY
Qty: 90 TABLET | Refills: 0 | Status: SHIPPED | OUTPATIENT
Start: 2020-09-15 | End: 2021-02-08

## 2020-09-15 NOTE — PROGRESS NOTES
Patient presents with:  Diabetes    DM, HL, HTN f/u. HPI:   Kath Grace is a 79year old male who presents for recheck of his diabetes, HTN, lipids. Patient’s FBS have been 's . Post prandial not checking.     Last A1c value was 6.5% done 7/17/20 and is increasing his amount every couple days. He has not been able to walk to his mailbox without pain previously. Taking his statin, Plavix, aspirin. Denies legs turning blue or pain at rest.  Denies leg swelling.   CTA abdomen pelvis with runoff comp (132.3 kg)  06/26/20 : 290 lb (131.5 kg)  06/24/20 : 287 lb 5 oz (130.3 kg)  06/01/20 : 290 lb 12.8 oz (131.9 kg)    HEMOGLOBIN A1C (%)   Date Value   02/15/2018 5.9 (A)   05/16/2013 6.1 (A)     HEMOGLOBIN A1c (% of total Hgb)   Date Value   05/27/2020 6. 3 Accu-Chek FastClix Lancets Does not apply Misc 1 lancet by Finger stick route 2 (two) times daily. Use as directed.  (Patient not taking: Reported on 7/1/2020 ) 1 Box 11   • Blood Glucose Monitoring Suppl (ACCU-CHEK JAQUELINE PLUS) w/Device Does not apply Kit D Procedure Laterality Date   • ABDOMINAL AORTIC ANEURYSM ENDOSCOPIC N/A 7/16/2020    Performed by Haylee Phillips MD at [address] [facility]     • CABG  2003    X 4 vessels   • CATARACT  2018    Right eye-Dr. Alfred Jernigan   • CATARACT     • CHOLECYSTECTOMY Lungs: Clear to auscultation bilaterally, with no wheeze, rhonchi, or rales. Abdomen: soft, NT/ND no HSM and NABS. Extremities: symmetric no abnormalities and normal strength. No cyanosis, clubbing or edema.   Negative Homans bilateral.  Vascular: B POTASSIUM, SERUM      3.5 - 5.3 mmol/L 4.5   CHLORIDE, SERUM      98 - 110 mmol/L 104   CARBON DIOXIDE      20 - 32 mmol/L 25   CALCIUM      8.6 - 10.3 mg/dL 9.3   PROTEIN, TOTAL      6.1 - 8.1 g/dL 6.5   Albumin      3.6 - 5.1 g/dL 4.1   Globulin, Total 100    • ISTAT Blood Gas Base Exc* 07/16/2020 -2.0    • ISTAT Sample Type 07/16/2020 Arterial    • ISTAT Activated Clotting* 07/16/2020 114    • PT 07/16/2020 16.8*   • INR 07/16/2020 1.32*   • PTT 07/16/2020 >300.0*   • ISTAT Activated Clotting* 07/16/202 07/17/2020 4.68    • HGB 07/17/2020 13.9    • HCT 07/17/2020 43.0    • PLT 07/17/2020 203.0    • MCV 07/17/2020 91.9    • MCH 07/17/2020 29.7    • MCHC 07/17/2020 32.3    • RDW 07/17/2020 12.8    • RDW-SD 07/17/2020 43.0    • Neutrophil Absolute Prel* 07/1 lipid panel if performed in December for cardiologist  LDL goal <70  Continue Zetia 10 mg due to PAD  Continue metoprolol, Plavix, ASA, rosuvastatin, amlodipine,   Risk of rhabdomyolysis and muscle breakdown increased with statin-new joint pains, muscle ac Medical education done. Outcome: Patient verbalizes understanding. Patient is notified to call with any questions, complications, allergies, or worsening or changing symptoms.   Patient is to call with any side effects or complications from the treatments

## 2020-10-19 ENCOUNTER — TELEPHONE (OUTPATIENT)
Dept: FAMILY MEDICINE CLINIC | Facility: CLINIC | Age: 70
End: 2020-10-19

## 2020-10-19 DIAGNOSIS — N40.0 BENIGN PROSTATIC HYPERPLASIA WITHOUT LOWER URINARY TRACT SYMPTOMS: Primary | ICD-10-CM

## 2020-10-19 DIAGNOSIS — N52.9 VASCULOGENIC ERECTILE DYSFUNCTION, UNSPECIFIED VASCULOGENIC ERECTILE DYSFUNCTION TYPE: ICD-10-CM

## 2020-10-19 NOTE — TELEPHONE ENCOUNTER
Patient called and said that he is at Dr Bhavna Regalado office and they do not have a referral for him please put one in and please call him when complete.

## 2020-10-28 DIAGNOSIS — I73.9 PAD (PERIPHERAL ARTERY DISEASE) (HCC): ICD-10-CM

## 2020-10-28 DIAGNOSIS — Z95.1 S/P CABG (CORONARY ARTERY BYPASS GRAFT): ICD-10-CM

## 2020-10-28 DIAGNOSIS — E78.2 HYPERLIPIDEMIA, MIXED: ICD-10-CM

## 2020-10-28 DIAGNOSIS — I25.110 CORONARY ARTERY DISEASE INVOLVING NATIVE CORONARY ARTERY OF NATIVE HEART WITH UNSTABLE ANGINA PECTORIS (HCC): ICD-10-CM

## 2020-10-28 RX ORDER — EZETIMIBE 10 MG/1
TABLET ORAL
Qty: 90 TABLET | Refills: 0 | Status: SHIPPED | OUTPATIENT
Start: 2020-10-28 | End: 2021-01-15

## 2020-10-28 NOTE — TELEPHONE ENCOUNTER
Pt requesting refill of   Requested Prescriptions     Pending Prescriptions Disp Refills   • EZETIMIBE 10 MG Oral Tab [Pharmacy Med Name: EZETIMIBE 10 MG Tablet] 90 tablet 0     Sig: TAKE 1 TABLET EVERY NIGHT     Passed protocol, refill approved, sent to p

## 2020-11-05 ENCOUNTER — LAB ENCOUNTER (OUTPATIENT)
Dept: LAB | Facility: HOSPITAL | Age: 70
End: 2020-11-05
Attending: INTERNAL MEDICINE
Payer: MEDICARE

## 2020-11-05 DIAGNOSIS — I25.10 CORONARY ATHEROSCLEROSIS OF NATIVE CORONARY ARTERY: ICD-10-CM

## 2020-11-05 DIAGNOSIS — E78.2 MIXED HYPERLIPIDEMIA: ICD-10-CM

## 2020-11-05 DIAGNOSIS — Z01.818 PREOP EXAMINATION: Primary | ICD-10-CM

## 2020-11-05 DIAGNOSIS — R73.9 BLOOD GLUCOSE ELEVATED: ICD-10-CM

## 2020-11-05 PROCEDURE — 36415 COLL VENOUS BLD VENIPUNCTURE: CPT | Performed by: FAMILY MEDICINE

## 2020-11-05 PROCEDURE — 80053 COMPREHEN METABOLIC PANEL: CPT | Performed by: FAMILY MEDICINE

## 2020-11-05 PROCEDURE — 83036 HEMOGLOBIN GLYCOSYLATED A1C: CPT | Performed by: FAMILY MEDICINE

## 2020-11-05 PROCEDURE — 80061 LIPID PANEL: CPT | Performed by: FAMILY MEDICINE

## 2020-11-17 ENCOUNTER — E-ADVICE (OUTPATIENT)
Dept: CARDIOLOGY | Age: 70
End: 2020-11-17

## 2020-12-16 ENCOUNTER — APPOINTMENT (OUTPATIENT)
Dept: CARDIOLOGY | Age: 70
End: 2020-12-16

## 2021-01-04 ENCOUNTER — TELEMEDICINE (OUTPATIENT)
Dept: FAMILY MEDICINE CLINIC | Facility: CLINIC | Age: 71
End: 2021-01-04

## 2021-01-04 DIAGNOSIS — Z20.822 EXPOSURE TO COVID-19 VIRUS: ICD-10-CM

## 2021-01-04 DIAGNOSIS — R05.9 COUGH: Primary | ICD-10-CM

## 2021-01-04 PROCEDURE — 99213 OFFICE O/P EST LOW 20 MIN: CPT | Performed by: FAMILY MEDICINE

## 2021-01-04 NOTE — PROGRESS NOTES
Video Visit    This visit is conducted using Telemedicine with live, interactive video and audio. Maryse Fowler  verbally consents to a Video visit.     Patient understands and accepts financial responsibility for any deductible, co-insurance and/or co-p

## 2021-01-04 NOTE — PATIENT INSTRUCTIONS
Coronavirus Disease 2019 (COVID-19): Overview  Coronavirus disease 2019 (COVID-19) is a respiratory illness. It's caused by a new (novel) coronavirus. There are many types of coronavirus. Coronaviruses are a very common cause of colds and bronchitis.  The · New loss of sense of smell or taste  You can check your symptoms with the CDC’s Coronavirus Self-. What are possible complications from [id number]? In many cases, this virus can cause infection (pneumonia) in both lungs.  In some cases, this can ca Your healthcare provider will ask about your symptoms. He or she will ask where you live, and about your recent travel, and any contact with sick people.  If your healthcare provider thinks you may have COVID-19, he or she will consider whether to test you · Antigen test. This can find proteins from the SARS-CoV-2 virus. This is done by a nose or a nose-throat swab. Depending on the test, some results are back within an hour.  Positive results are highly accurate, but false positives can happen, especially in The FDA has approved a vaccine to prevent COVID-19 in people 16 years and older who are not pregnant or breastfeeding. It's not currently available to the entire public.  The first phase of vaccine roll-out will go to healthcare staff and residents of long- · Remdesivir. The FDA has approved an IV (intravenous) antiviral medicine called remdesivir. It works by stopping the spread of the SARS-CoV-2 virus in the body.  It's approved for people in the hospital. It's for people 12 years and older who weigh more th You are at risk for COVID-19 if you have had close contact with someone with the virus, or if you live in or traveled to an area with cases of it. Close contact means being within 6 feet of a person known to have COVID-19 for a total of 15 minutes or more.

## 2021-01-06 ENCOUNTER — TELEPHONE (OUTPATIENT)
Dept: CARDIOLOGY | Age: 71
End: 2021-01-06

## 2021-01-06 ENCOUNTER — LAB ENCOUNTER (OUTPATIENT)
Dept: LAB | Age: 71
End: 2021-01-06
Attending: FAMILY MEDICINE
Payer: MEDICARE

## 2021-01-06 DIAGNOSIS — R05.9 COUGH: ICD-10-CM

## 2021-01-06 DIAGNOSIS — Z20.822 EXPOSURE TO COVID-19 VIRUS: ICD-10-CM

## 2021-01-08 LAB — SARS-COV-2 RNA RESP QL NAA+PROBE: DETECTED

## 2021-01-08 NOTE — PROGRESS NOTES
Please call pt to inform that he is POSITIVE for Covid. If clinically he is doing worse, please make a f-u appt with [facility] or me, or if having severe symptoms, then go to ED.    He mentioned his wife is having a procedure done- I think she got tested al

## 2021-01-13 ENCOUNTER — APPOINTMENT (OUTPATIENT)
Dept: CARDIOLOGY | Age: 71
End: 2021-01-13

## 2021-01-14 ENCOUNTER — TELEMEDICINE (OUTPATIENT)
Dept: FAMILY MEDICINE CLINIC | Facility: CLINIC | Age: 71
End: 2021-01-14

## 2021-01-14 DIAGNOSIS — U07.1 COVID-19 VIRUS INFECTION: Primary | ICD-10-CM

## 2021-01-14 PROCEDURE — 99213 OFFICE O/P EST LOW 20 MIN: CPT | Performed by: FAMILY MEDICINE

## 2021-01-14 NOTE — PATIENT INSTRUCTIONS
Coronavirus Disease 2019 (COVID-19): Prevention  The best prevention is to have no contact with the SARS-CoV-2 virus. The FDA has approved a vaccine to prevent COVID-19 in people 16 years and older who are not pregnant or breastfeeding.  It's not currentl The CDC advises that people should not travel to areas where there are COVID-19 outbreaks right now for any reason that is not urgent.  For the most current CDC travel advisories, visit the [facility] website at www.cdc.gov/coronavirus/2019-ncov/travelers. Don’t g · When possible, don't touch \"high-touch\" public surfaces such as doorknobs and handles, cabinet handles, and light switches. If you touch these surfaces, try to clean them first with a disinfecting wipe. Or touch them using a tissue or paper towel.   · U ? Anyone who is unconscious or unable to remove the face covering without help. See the CDC's guidance on who should not wear a face mask.     If you are at a work site  · If you feel sick in any way, go home and stay home.   · Tell your supervisor if you a · Don’t touch other people’s eating or drinking utensils. · If you need to cough or sneeze, do it into a tissue. Then throw the tissue into the trash. If you don't have tissues, cough or sneeze into the bend of your elbow.     If you have been exposed to a · Your limits are different if you've had COVID-19 in the last 3 months but are fully recovered without symptoms and you have been exposed to someone with COVID-19. If you are symptom-free, you don't need to quarantine or be retested.  The CDC doesn't recom

## 2021-01-14 NOTE — PROGRESS NOTES
EEG TECHNICIAN NOTE      NAME: Jose Tony  MICHAELAB:  2021  MRN:    [id number]  DATE:  09/04/21    ASSESSMENT     Patient assessed for:    [x]Skin Breakdown:   1. [x] Skin WNL   2. [] Breakdown noted    A. RN Notified     B. Leads Repositioned   [x]Lead Placement:   1.[x] All leads in Place   2.[] Leads need Attention    A. Leads Replaced, Repositioned and Head Wrapped as needed.  [x] System Setup:   1. [x]System running as expected.   2. [] System Rebooted technical support called    3. [] technical support called     [x] Restock Equipment:   1. [] Restocked    [x] Monitoring    1. [] Continued    2. [x] Discontinued    Summary   CEEG discontinued ,no skin irritation noted.77 hrs recording time          Rose Ivy       EEG TECHNICIAN       Video Visit    This visit is conducted using Telemedicine with live, interactive video and audio. Maryann Mi  verbally consents to a Video visit.     Patient understands and accepts financial responsibility for any deductible, co-insurance and/or co-p Covid-related questions. Return if symptoms worsen or fail to improve.       Vy Wright MD

## 2021-01-15 DIAGNOSIS — I73.9 PAD (PERIPHERAL ARTERY DISEASE) (HCC): ICD-10-CM

## 2021-01-15 DIAGNOSIS — I25.110 CORONARY ARTERY DISEASE INVOLVING NATIVE CORONARY ARTERY OF NATIVE HEART WITH UNSTABLE ANGINA PECTORIS (HCC): ICD-10-CM

## 2021-01-15 DIAGNOSIS — E78.2 HYPERLIPIDEMIA, MIXED: ICD-10-CM

## 2021-01-15 DIAGNOSIS — Z95.1 S/P CABG (CORONARY ARTERY BYPASS GRAFT): ICD-10-CM

## 2021-01-15 RX ORDER — EZETIMIBE 10 MG/1
TABLET ORAL
Qty: 90 TABLET | Refills: 0 | Status: SHIPPED | OUTPATIENT
Start: 2021-01-15 | End: 2021-05-24

## 2021-01-15 NOTE — TELEPHONE ENCOUNTER
Pt requesting refill of   Requested Prescriptions     Pending Prescriptions Disp Refills   • EZETIMIBE 10 MG Oral Tab [Pharmacy Med Name: EZETIMIBE 10 MG Tablet] 90 tablet 0     Sig: TAKE 1 TABLET EVERY NIGHT       Passed protocol, refill approved, sent

## 2021-01-27 ENCOUNTER — APPOINTMENT (OUTPATIENT)
Dept: CARDIOLOGY | Age: 71
End: 2021-01-27

## 2021-02-01 DIAGNOSIS — Z23 NEED FOR VACCINATION: ICD-10-CM

## 2021-02-02 ENCOUNTER — IMMUNIZATION (OUTPATIENT)
Dept: LAB | Age: 71
End: 2021-02-02
Attending: HOSPITALIST
Payer: MEDICARE

## 2021-02-02 DIAGNOSIS — Z23 NEED FOR VACCINATION: Primary | ICD-10-CM

## 2021-02-02 PROCEDURE — 0001A SARSCOV2 VAC 30MCG/0.3ML IM: CPT

## 2021-02-05 RX ORDER — ISOPROPYL ALCOHOL 0.75 G/1
SWAB TOPICAL
Refills: 0 | OUTPATIENT
Start: 2021-02-05

## 2021-02-05 RX ORDER — BLOOD GLUCOSE CONTROL HIGH,LOW
EACH MISCELLANEOUS
Refills: 0 | OUTPATIENT
Start: 2021-02-05

## 2021-02-05 NOTE — TELEPHONE ENCOUNTER
Pt requesting refill of   Requested Prescriptions     Pending Prescriptions Disp Refills   • Blood Glucose Calibration (ACCU-CHEK JAQUELINE) In Vitro Solution  0   • Alcohol Swabs (BD SWAB SINGLE USE REGULAR) Does not apply Pads  0       Passed protocol    L

## 2021-02-06 DIAGNOSIS — I10 ESSENTIAL HYPERTENSION: ICD-10-CM

## 2021-02-08 RX ORDER — AMLODIPINE BESYLATE 5 MG/1
5 TABLET ORAL DAILY
Qty: 90 TABLET | Refills: 0 | Status: SHIPPED | OUTPATIENT
Start: 2021-02-08 | End: 2021-03-16

## 2021-02-08 RX ORDER — METOPROLOL SUCCINATE 50 MG/1
50 TABLET, EXTENDED RELEASE ORAL DAILY
Qty: 90 TABLET | Refills: 0 | Status: SHIPPED | OUTPATIENT
Start: 2021-02-08 | End: 2021-05-24

## 2021-02-08 NOTE — TELEPHONE ENCOUNTER
Pt requesting refill of   Requested Prescriptions     Pending Prescriptions Disp Refills   • AMLODIPINE BESYLATE 5 MG Oral Tab [Pharmacy Med Name: AMLODIPINE BESYLATE 5 MG Tablet] 90 tablet 0     Sig: TAKE 1 TABLET (5 MG TOTAL) BY MOUTH DAILY.    • METOPR

## 2021-02-09 ENCOUNTER — TELEPHONE (OUTPATIENT)
Dept: FAMILY MEDICINE CLINIC | Facility: CLINIC | Age: 71
End: 2021-02-09

## 2021-02-09 NOTE — TELEPHONE ENCOUNTER
Call placed to [facility], spoke with Mikey Bosworth, pharmacy tech. They are following up on request for BD alcohol swabs and accu-check solution. Advised patient declined refill on 2/5/21. They state they will close their encounter for this.

## 2021-02-09 NOTE — TELEPHONE ENCOUNTER
Levy Styles with [address] [place] [address] Dr gonsalez stated she was Following up on a request they sent over. Please call [address] [place] [address]  back at [phone].

## 2021-02-13 DIAGNOSIS — E11.51 DM (DIABETES MELLITUS) TYPE II, CONTROLLED, WITH PERIPHERAL VASCULAR DISORDER (HCC): ICD-10-CM

## 2021-02-15 RX ORDER — METFORMIN HYDROCHLORIDE 500 MG/1
500 TABLET, EXTENDED RELEASE ORAL
Qty: 90 TABLET | Refills: 0 | Status: SHIPPED | OUTPATIENT
Start: 2021-02-15 | End: 2021-05-24

## 2021-02-18 ENCOUNTER — ANESTHESIA EVENT (OUTPATIENT)
Dept: ENDOSCOPY | Facility: HOSPITAL | Age: 71
End: 2021-02-18
Payer: MEDICARE

## 2021-02-23 ENCOUNTER — IMMUNIZATION (OUTPATIENT)
Dept: LAB | Age: 71
End: 2021-02-23
Attending: HOSPITALIST
Payer: MEDICARE

## 2021-02-23 DIAGNOSIS — Z23 NEED FOR VACCINATION: Primary | ICD-10-CM

## 2021-02-23 PROCEDURE — 0002A SARSCOV2 VAC 30MCG/0.3ML IM: CPT

## 2021-03-01 ENCOUNTER — TELEPHONE (OUTPATIENT)
Dept: FAMILY MEDICINE CLINIC | Facility: CLINIC | Age: 71
End: 2021-03-01

## 2021-03-01 ENCOUNTER — OFFICE VISIT (OUTPATIENT)
Dept: CARDIOLOGY | Age: 71
End: 2021-03-01

## 2021-03-01 VITALS
DIASTOLIC BLOOD PRESSURE: 60 MMHG | BODY MASS INDEX: 34.99 KG/M2 | HEART RATE: 60 BPM | HEIGHT: 73 IN | WEIGHT: 264 LBS | SYSTOLIC BLOOD PRESSURE: 114 MMHG

## 2021-03-01 DIAGNOSIS — I71.4 ABDOMINAL AORTIC ANEURYSM (AAA) WITHOUT RUPTURE (HCC): ICD-10-CM

## 2021-03-01 DIAGNOSIS — E11.69 DIABETES MELLITUS TYPE 2 IN OBESE (CMD): Chronic | ICD-10-CM

## 2021-03-01 DIAGNOSIS — Z95.828 STATUS POST INSERTION OF ILIAC ARTERY STENT: ICD-10-CM

## 2021-03-01 DIAGNOSIS — G47.33 OBSTRUCTIVE SLEEP APNEA SYNDROME: ICD-10-CM

## 2021-03-01 DIAGNOSIS — E66.9 DIABETES MELLITUS TYPE 2 IN OBESE (CMD): Chronic | ICD-10-CM

## 2021-03-01 DIAGNOSIS — E78.2 HYPERLIPIDEMIA, MIXED: Primary | ICD-10-CM

## 2021-03-01 DIAGNOSIS — Z95.1 HX OF CABG: ICD-10-CM

## 2021-03-01 DIAGNOSIS — I65.23 BILATERAL CAROTID ARTERY STENOSIS: ICD-10-CM

## 2021-03-01 DIAGNOSIS — R73.9 HYPERGLYCEMIA: Chronic | ICD-10-CM

## 2021-03-01 DIAGNOSIS — I77.9 BILATERAL CAROTID ARTERY DISEASE, UNSPECIFIED TYPE (HCC): ICD-10-CM

## 2021-03-01 DIAGNOSIS — I71.40 ABDOMINAL AORTIC ANEURYSM (AAA) WITHOUT RUPTURE (CMD): ICD-10-CM

## 2021-03-01 DIAGNOSIS — I25.118 CORONARY ARTERY DISEASE OF NATIVE ARTERY OF NATIVE HEART WITH STABLE ANGINA PECTORIS (HCC): ICD-10-CM

## 2021-03-01 DIAGNOSIS — I70.202 ATHEROSCLEROSIS OF ARTERY OF LEFT LOWER EXTREMITY (CMD): Chronic | ICD-10-CM

## 2021-03-01 DIAGNOSIS — Z95.1 S/P CABG (CORONARY ARTERY BYPASS GRAFT): ICD-10-CM

## 2021-03-01 DIAGNOSIS — I25.2 OLD MI (MYOCARDIAL INFARCTION): ICD-10-CM

## 2021-03-01 DIAGNOSIS — I10 ESSENTIAL HYPERTENSION: Primary | ICD-10-CM

## 2021-03-01 DIAGNOSIS — I25.10 CAD IN NATIVE ARTERY: ICD-10-CM

## 2021-03-01 PROCEDURE — 3078F DIAST BP <80 MM HG: CPT | Performed by: INTERNAL MEDICINE

## 2021-03-01 PROCEDURE — 3074F SYST BP LT 130 MM HG: CPT | Performed by: INTERNAL MEDICINE

## 2021-03-01 PROCEDURE — 99214 OFFICE O/P EST MOD 30 MIN: CPT | Performed by: INTERNAL MEDICINE

## 2021-03-01 SDOH — HEALTH STABILITY: PHYSICAL HEALTH: ON AVERAGE, HOW MANY MINUTES DO YOU ENGAGE IN EXERCISE AT THIS LEVEL?: 30 MIN

## 2021-03-01 SDOH — HEALTH STABILITY: PHYSICAL HEALTH: ON AVERAGE, HOW MANY DAYS PER WEEK DO YOU ENGAGE IN MODERATE TO STRENUOUS EXERCISE (LIKE A BRISK WALK)?: 5 DAYS

## 2021-03-01 SDOH — SOCIAL STABILITY: SOCIAL NETWORK: ARE YOU MARRIED, WIDOWED, DIVORCED, SEPARATED, NEVER MARRIED, OR LIVING WITH A PARTNER?: MARRIED

## 2021-03-01 ASSESSMENT — ENCOUNTER SYMPTOMS
CHILLS: 0
BRUISES/BLEEDS EASILY: 0
HEMATOCHEZIA: 0
FEVER: 0
WEIGHT GAIN: 0
COUGH: 0
HEMOPTYSIS: 0
ALLERGIC/IMMUNOLOGIC COMMENTS: NO NEW FOOD ALLERGIES
SUSPICIOUS LESIONS: 0
WEIGHT LOSS: 0

## 2021-03-01 ASSESSMENT — PATIENT HEALTH QUESTIONNAIRE - PHQ9
SUM OF ALL RESPONSES TO PHQ9 QUESTIONS 1 AND 2: 0
SUM OF ALL RESPONSES TO PHQ9 QUESTIONS 1 AND 2: 0
1. LITTLE INTEREST OR PLEASURE IN DOING THINGS: NOT AT ALL
CLINICAL INTERPRETATION OF PHQ9 SCORE: NO FURTHER SCREENING NEEDED
2. FEELING DOWN, DEPRESSED OR HOPELESS: NOT AT ALL
CLINICAL INTERPRETATION OF PHQ2 SCORE: NO FURTHER SCREENING NEEDED

## 2021-03-01 NOTE — TELEPHONE ENCOUNTER
Pt states he needs referral to Dr. Marlys Christine renewed for his appt today at [address] [facility] referral entered.

## 2021-03-03 ENCOUNTER — ANESTHESIA (OUTPATIENT)
Dept: ENDOSCOPY | Facility: HOSPITAL | Age: 71
End: 2021-03-03
Payer: MEDICARE

## 2021-03-03 ENCOUNTER — HOSPITAL ENCOUNTER (OUTPATIENT)
Facility: HOSPITAL | Age: 71
Setting detail: HOSPITAL OUTPATIENT SURGERY
Discharge: HOME | End: 2021-03-03
Attending: INTERNAL MEDICINE | Admitting: INTERNAL MEDICINE
Payer: MEDICARE

## 2021-03-03 VITALS
RESPIRATION RATE: 16 BRPM | HEIGHT: 73 IN | OXYGEN SATURATION: 96 % | BODY MASS INDEX: 33.13 KG/M2 | HEART RATE: 58 BPM | SYSTOLIC BLOOD PRESSURE: 126 MMHG | DIASTOLIC BLOOD PRESSURE: 68 MMHG | WEIGHT: 250 LBS | TEMPERATURE: 98 F

## 2021-03-03 DIAGNOSIS — K52.3 COLITIS, INDETERMINATE: Primary | ICD-10-CM

## 2021-03-03 DIAGNOSIS — Z86.010 HISTORY OF COLON POLYPS: ICD-10-CM

## 2021-03-03 DIAGNOSIS — K52.9 COLITIS: ICD-10-CM

## 2021-03-03 PROCEDURE — 88305 TISSUE EXAM BY PATHOLOGIST: CPT | Performed by: INTERNAL MEDICINE

## 2021-03-03 PROCEDURE — 82962 GLUCOSE BLOOD TEST: CPT

## 2021-03-03 PROCEDURE — 3E0H8GC INTRODUCTION OF OTHER THERAPEUTIC SUBSTANCE INTO LOWER GI, VIA NATURAL OR ARTIFICIAL OPENING ENDOSCOPIC: ICD-10-PCS | Performed by: INTERNAL MEDICINE

## 2021-03-03 PROCEDURE — 0DBK8ZX EXCISION OF ASCENDING COLON, VIA NATURAL OR ARTIFICIAL OPENING ENDOSCOPIC, DIAGNOSTIC: ICD-10-PCS | Performed by: INTERNAL MEDICINE

## 2021-03-03 RX ORDER — SODIUM CHLORIDE, SODIUM LACTATE, POTASSIUM CHLORIDE, CALCIUM CHLORIDE 600; 310; 30; 20 MG/100ML; MG/100ML; MG/100ML; MG/100ML
INJECTION, SOLUTION INTRAVENOUS CONTINUOUS
Status: DISCONTINUED | OUTPATIENT
Start: 2021-03-03 | End: 2021-03-03

## 2021-03-03 RX ORDER — SODIUM CHLORIDE, SODIUM LACTATE, POTASSIUM CHLORIDE, CALCIUM CHLORIDE 600; 310; 30; 20 MG/100ML; MG/100ML; MG/100ML; MG/100ML
INJECTION, SOLUTION INTRAVENOUS CONTINUOUS
Status: CANCELLED | OUTPATIENT
Start: 2021-03-03

## 2021-03-03 RX ORDER — DEXTROSE MONOHYDRATE 25 G/50ML
50 INJECTION, SOLUTION INTRAVENOUS
Status: CANCELLED | OUTPATIENT
Start: 2021-03-03

## 2021-03-03 RX ORDER — ONDANSETRON 2 MG/ML
4 INJECTION INTRAMUSCULAR; INTRAVENOUS AS NEEDED
Status: CANCELLED | OUTPATIENT
Start: 2021-03-03 | End: 2021-03-03

## 2021-03-03 RX ORDER — LIDOCAINE HYDROCHLORIDE 10 MG/ML
INJECTION, SOLUTION EPIDURAL; INFILTRATION; INTRACAUDAL; PERINEURAL AS NEEDED
Status: DISCONTINUED | OUTPATIENT
Start: 2021-03-03 | End: 2021-03-03 | Stop reason: SURG

## 2021-03-03 RX ORDER — NALOXONE HYDROCHLORIDE 0.4 MG/ML
80 INJECTION, SOLUTION INTRAMUSCULAR; INTRAVENOUS; SUBCUTANEOUS AS NEEDED
Status: CANCELLED | OUTPATIENT
Start: 2021-03-03 | End: 2021-03-03

## 2021-03-03 RX ORDER — DEXTROSE MONOHYDRATE 25 G/50ML
50 INJECTION, SOLUTION INTRAVENOUS
Status: DISCONTINUED | OUTPATIENT
Start: 2021-03-03 | End: 2021-03-03

## 2021-03-03 RX ADMIN — LIDOCAINE HYDROCHLORIDE 50 MG: 10 INJECTION, SOLUTION EPIDURAL; INFILTRATION; INTRACAUDAL; PERINEURAL at 07:06:00

## 2021-03-03 NOTE — ANESTHESIA POSTPROCEDURE EVALUATION
[facility] Patient Status:  Hospital Outpatient Surgery   Age/Gender 79year old male MRN [id number]   Location [address] [facility]. Attending Leela Sarah, [address] [facility] [place] Day # 0 PCP Crow Levin DO       Anesthesia Post-op Note

## 2021-03-03 NOTE — H&P
History & Physical Examination    Patient Name: Maryse Fowler  MRN: [id number]  CSN: [id number]  YOB: 1950    Diagnosis: history of colon polyps, indeterminate coliits    Present Illness: 80 y/o M history as above presents for Colonoscopy. PLUS) In Vitro Strip, Use as directed., Disp: 100 strip, Rfl: 11    •  Blood Glucose Monitoring Suppl (ACCU-CHEK JQAUELINE PLUS) w/Device Does not apply Kit, Diagnosis code: E11.51 Disp: 1 unit BID accuchecks recommended, Disp: 1 kit, Rfl: 0        •  lactated of circulatory system     CAD   • PVD (peripheral vascular disease) ([facility] [address] 7/14/2014    On screening 2014 legs erum   • Shortness of breath     with exertion   • Stented coronary artery     Not in heart, off of aorta to both legs   • Type II or unspecified t not normal, please explain:   HEENT [ x] [x ]    NECK & BACK [ x] [ x]    HEART [ x] [x ]    LUNGS [ x] [ x]    ABDOMEN [ x] [x ]    UROGENITAL [ n/a] [ n/a]    EXTREMITIES [ x] [x ]    OTHER        [ x ] I have discussed the risks and benefits and alterna

## 2021-03-03 NOTE — ANESTHESIA PREPROCEDURE EVALUATION
PRE-OP EVALUATION    Patient Name: Oliver Ill    Pre-op Diagnosis: History of colon polyps [Z86.010]  Colitis [K52.9]    Procedure(s):  colonoscopy    Surgeon(s) and Role:     * Alberto Stokes, DO - Primary    Pre-op vitals reviewed.   Temp: 98.1 °F (3 Oral Tab, Take 500 mg by mouth every 6 (six) hours as needed for Pain., Disp: , Rfl: , 3/1/2021    •  lisinopril (PRINIVIL,ZESTRIL) 5 MG Oral Tab, Take 5 mg by mouth daily. , Disp: , Rfl: , 3/2/2021 at 1200    •  aspirin 81 MG Oral Tab EC, Take 81 mg by mary Right eye-Dr. Quirino Breaux   • CATARACT     • CHOLECYSTECTOMY  2003   • COLONOSCOPY  2003   • COLONOSCOPY N/A 1/17/2020    Performed by Connor Bradley DO at [facility] ENDOSCOPY   • COLONOSCOPY N/A 1/30/2015    Performed by Felix Rayo MD at [facility] ENDOSCOPY   •

## 2021-03-03 NOTE — OPERATIVE REPORT
Chioma Dominique Patient Status:  Hospital Outpatient Surgery    1950 MRN [id number]   [facility] ENDOSCOPY Attending Emmie Mercedes DO   Hosp Day # 0 PCP Mateo Rodriguez DO       PREOPERATIVE DIAGNOSIS/INDICATION: History of col colon: A 30 mm laterally spreading polyp (LSGT Marla IIa) in the proximal ascending colon s/p cold snare technique endoscopic mucosal resection. 1 mL of 1:10,000 epinephrine was injected prior to EMR to prevent bleeding. Resection was complete.    Transvers

## 2021-03-04 RX ORDER — LANCETS
EACH MISCELLANEOUS
COMMUNITY
Start: 2020-02-27 | End: 2021-03-05 | Stop reason: ALTCHOICE

## 2021-03-05 ENCOUNTER — OFFICE VISIT (OUTPATIENT)
Dept: FAMILY MEDICINE CLINIC | Facility: CLINIC | Age: 71
End: 2021-03-05
Payer: MEDICARE

## 2021-03-05 VITALS
OXYGEN SATURATION: 95 % | HEART RATE: 68 BPM | BODY MASS INDEX: 34.7 KG/M2 | TEMPERATURE: 97 F | DIASTOLIC BLOOD PRESSURE: 60 MMHG | WEIGHT: 261.81 LBS | SYSTOLIC BLOOD PRESSURE: 102 MMHG | HEIGHT: 73 IN | RESPIRATION RATE: 18 BRPM

## 2021-03-05 DIAGNOSIS — I70.213 ATHEROSCLEROSIS OF NATIVE ARTERY OF BOTH LOWER EXTREMITIES WITH INTERMITTENT CLAUDICATION (HCC): ICD-10-CM

## 2021-03-05 DIAGNOSIS — I74.09 AORTOILIAC OCCLUSIVE DISEASE (HCC): ICD-10-CM

## 2021-03-05 DIAGNOSIS — H90.3 SENSORINEURAL HEARING LOSS (SNHL) OF BOTH EARS: ICD-10-CM

## 2021-03-05 DIAGNOSIS — Z95.1 S/P CABG (CORONARY ARTERY BYPASS GRAFT): ICD-10-CM

## 2021-03-05 DIAGNOSIS — M47.812 FACET ARTHRITIS OF CERVICAL REGION: ICD-10-CM

## 2021-03-05 DIAGNOSIS — N52.9 VASCULOGENIC ERECTILE DYSFUNCTION, UNSPECIFIED VASCULOGENIC ERECTILE DYSFUNCTION TYPE: ICD-10-CM

## 2021-03-05 DIAGNOSIS — I70.203 BILATERAL ATHEROSCLEROSIS OF LEGS (HCC): ICD-10-CM

## 2021-03-05 DIAGNOSIS — Z00.00 ENCOUNTER FOR ANNUAL HEALTH EXAMINATION: Primary | ICD-10-CM

## 2021-03-05 DIAGNOSIS — Z99.89 OSA ON CPAP: ICD-10-CM

## 2021-03-05 DIAGNOSIS — E66.09 CLASS 1 OBESITY DUE TO EXCESS CALORIES WITH SERIOUS COMORBIDITY AND BODY MASS INDEX (BMI) OF 34.0 TO 34.9 IN ADULT: ICD-10-CM

## 2021-03-05 DIAGNOSIS — M54.2 NECK PAIN ON LEFT SIDE: ICD-10-CM

## 2021-03-05 DIAGNOSIS — M51.36 LUMBAR DEGENERATIVE DISC DISEASE: ICD-10-CM

## 2021-03-05 DIAGNOSIS — M47.22 OSTEOARTHRITIS OF SPINE WITH RADICULOPATHY, CERVICAL REGION: ICD-10-CM

## 2021-03-05 DIAGNOSIS — M47.816 ARTHRITIS OF LUMBAR SPINE: ICD-10-CM

## 2021-03-05 DIAGNOSIS — E11.51 DM (DIABETES MELLITUS) TYPE II, CONTROLLED, WITH PERIPHERAL VASCULAR DISORDER (HCC): ICD-10-CM

## 2021-03-05 DIAGNOSIS — D12.6 TUBULAR ADENOMA OF COLON: ICD-10-CM

## 2021-03-05 DIAGNOSIS — K76.0 FATTY LIVER: ICD-10-CM

## 2021-03-05 DIAGNOSIS — I71.4 ABDOMINAL AORTIC ANEURYSM (AAA) WITHOUT RUPTURE (HCC): ICD-10-CM

## 2021-03-05 DIAGNOSIS — Z23 NEED FOR VACCINATION: ICD-10-CM

## 2021-03-05 DIAGNOSIS — K22.70 BARRETT'S ESOPHAGUS DETERMINED BY ENDOSCOPY: ICD-10-CM

## 2021-03-05 DIAGNOSIS — I73.9 PAD (PERIPHERAL ARTERY DISEASE) (HCC): ICD-10-CM

## 2021-03-05 DIAGNOSIS — R63.4 WEIGHT LOSS: ICD-10-CM

## 2021-03-05 DIAGNOSIS — I70.202 ATHEROSCLEROSIS OF ARTERY OF LEFT LOWER EXTREMITY (HCC): ICD-10-CM

## 2021-03-05 DIAGNOSIS — E78.2 HYPERLIPIDEMIA, MIXED: ICD-10-CM

## 2021-03-05 DIAGNOSIS — Z87.39 HISTORY OF HERNIATED INTERVERTEBRAL DISC: ICD-10-CM

## 2021-03-05 DIAGNOSIS — I10 ESSENTIAL HYPERTENSION: ICD-10-CM

## 2021-03-05 DIAGNOSIS — D75.1 POLYCYTHEMIA: ICD-10-CM

## 2021-03-05 DIAGNOSIS — I25.118 CORONARY ARTERY DISEASE OF NATIVE ARTERY OF NATIVE HEART WITH STABLE ANGINA PECTORIS (HCC): ICD-10-CM

## 2021-03-05 DIAGNOSIS — I77.9 BILATERAL CAROTID ARTERY DISEASE, UNSPECIFIED TYPE (HCC): ICD-10-CM

## 2021-03-05 DIAGNOSIS — G47.33 OSA ON CPAP: ICD-10-CM

## 2021-03-05 DIAGNOSIS — K57.90 DIVERTICULOSIS: ICD-10-CM

## 2021-03-05 DIAGNOSIS — M48.02 FORAMINAL STENOSIS OF CERVICAL REGION: ICD-10-CM

## 2021-03-05 DIAGNOSIS — N40.0 BENIGN PROSTATIC HYPERPLASIA WITHOUT LOWER URINARY TRACT SYMPTOMS: ICD-10-CM

## 2021-03-05 PROBLEM — L98.9 SKIN LESION OF LEFT LEG: Status: RESOLVED | Noted: 2020-07-01 | Resolved: 2021-03-05

## 2021-03-05 PROBLEM — T78.40XA ALLERGIC REACTION: Status: RESOLVED | Noted: 2020-07-01 | Resolved: 2021-03-05

## 2021-03-05 PROBLEM — K52.3 COLITIS, INDETERMINATE: Status: RESOLVED | Noted: 2020-02-03 | Resolved: 2021-03-05

## 2021-03-05 PROCEDURE — 3078F DIAST BP <80 MM HG: CPT | Performed by: FAMILY MEDICINE

## 2021-03-05 PROCEDURE — G0439 PPPS, SUBSEQ VISIT: HCPCS | Performed by: FAMILY MEDICINE

## 2021-03-05 PROCEDURE — 3008F BODY MASS INDEX DOCD: CPT | Performed by: FAMILY MEDICINE

## 2021-03-05 PROCEDURE — 3074F SYST BP LT 130 MM HG: CPT | Performed by: FAMILY MEDICINE

## 2021-03-05 PROCEDURE — 96160 PT-FOCUSED HLTH RISK ASSMT: CPT | Performed by: FAMILY MEDICINE

## 2021-03-05 PROCEDURE — 99397 PER PM REEVAL EST PAT 65+ YR: CPT | Performed by: FAMILY MEDICINE

## 2021-03-05 NOTE — PROGRESS NOTES
HPI:   Omid Piña is a 79year old male who presents for a MA (Medicare Advantage) Supervisit (Once per calendar year). Patient’s FBS have been 110's. Post prandial not checking.     Last A1c value was 6.5% done 7/17/2020.   lost weight 30 pound up to 1 mile and is increasing his amount every couple days. He has not been able to walk to his mailbox without pain previously. Taking his statin, Plavix, aspirin. Denies legs turning blue or pain at rest.  Denies leg swelling.   CTA abdomen pelvis wit hematuria.     Will bring in POA when complete- spoke  with daughter-'forgot to bring\"     History of pancytopenia- consult with hematology ,  2019 due to KRISTEN. Last cbc normal except neutrophils increased 7/17/2020.  Hematology recommended caleb Smoking status: Former Smoker        Packs/day: 1.00        Years: 40.00        Pack years: 36        Start date: 1967        Quit date: 2003        Years since quittin.8      Smokeless tobacco: Never Used       Mr. Ennis already takes aspirin adult     Lumbar degenerative disc disease     PAD (peripheral artery disease) (HCC)     Atheroscler native arteries the extremities w/intermit claudication (HCC)     Atherosclerosis of artery of left lower extremity (HCC)     Skin lesion of left leg     A STRENGTH) 500 MG Oral Tab, Take 500 mg by mouth every 6 (six) hours as needed for Pain. •  lisinopril (PRINIVIL,ZESTRIL) 5 MG Oral Tab, Take 5 mg by mouth daily. •  aspirin 81 MG Oral Tab EC, Take 81 mg by mouth daily.          MEDICAL INFORMATION: mother; lung cancer in his father. SOCIAL HISTORY:   He  reports that he quit smoking about 17 years ago. He started smoking about 54 years ago. He has a 40.00 pack-year smoking history.  He has never used smokeless tobacco. He reports current alcohol use Ears:  Normal TM's and external ear canals, both ears, removed hearing aids for exam bilateral   Nose: Wearing mask   Throat: Wearing mask   Neck: Supple, symmetrical, trachea midline, no adenopathy, thyroid: not enlarged, symmetric, no  tenderness/mass/ on 03/03/2021, Discharged on 03/03/2021   Component Date Value   • POC Glucose 03/03/2021 117*   • Case Report 03/03/2021                      Value:Surgical Pathology                                Case: [id number]                                   [facility] • CO2 11/05/2020 27.0    • Anion Gap 11/05/2020 3    • BUN 11/05/2020 12    • Creatinine 11/05/2020 0.96    • BUN/CREA Ratio 11/05/2020 12.5    • Calcium, Total 11/05/2020 9.2    • Calculated Osmolality 11/05/2020 285    • GFR, Non- 11/05 cervical region [facility])  Neck pain on left side  History of herniated intervertebral disc  Comments:  cervical  Orders:  -     OP REFERRAL TO [facility]    Foraminal stenosis of cervical region  Osteoarthritis of spine with radiculopathy exercise    Martinez's esophagus determined by endoscopy  Tubular adenoma of colon  Diverticulosis  Repeat colonoscopy in 6 mos with GI due to large adenoma  EGD q 3 years, continue pantoprazole    Sensorineural hearing loss (SNHL) of both ears   Continue h External Lab or Procedure   Diabetes Screening      HbgA1C   Annually HEMOGLOBIN A1C (%)   Date Value   02/15/2018 5.9 (A)     HEMOGLOBIN A1c (% of total Hgb)   Date Value   05/27/2020 6.3 (H)     HgbA1C (%)   Date Value   11/05/2020 6.5 (H)       No flows Homosexual men   Illicit injectable drug abusers     Tetanus Toxoid  Only covered with a cut with metal- TD and TDaP Not covered by Medicare Part B) No vaccine history found This may be covered with your prescription benefits, but Medicare does not cover

## 2021-03-05 NOTE — PATIENT INSTRUCTIONS
Alfa Ennis's SCREENING SCHEDULE   Tests on this list are recommended by your physician but may not be covered, or covered at this frequency, by your insurer. Please check with your insurance carrier before scheduling to verify coverage.     PREVENTATIV aneurysm screening (once between ages 73-68)  No results found for this or any previous visit.  Limited to patients who meet one of the following criteria:   • Men who are 73-68 years old and have smoked more than 100 cigarettes in their lifetime   • Anyone Orders placed or performed in visit on 05/23/16   • PNEUMOCOCCAL IMM (PNEUMOVAX)    Please get once after your 65th birthday    Hepatitis B for Moderate/High Risk No orders found for this or any previous visit.  Medium/high risk factors:   End-stage renal d

## 2021-03-06 PROBLEM — E66.811 CLASS 1 OBESITY WITH SERIOUS COMORBIDITY AND BODY MASS INDEX (BMI) OF 34.0 TO 34.9 IN ADULT: Status: ACTIVE | Noted: 2020-02-03

## 2021-03-06 PROBLEM — E66.01 SEVERE OBESITY (BMI 35.0-39.9) WITH COMORBIDITY (HCC): Chronic | Status: RESOLVED | Noted: 2017-04-05 | Resolved: 2021-03-06

## 2021-03-06 PROBLEM — I25.110 CORONARY ARTERY DISEASE INVOLVING NATIVE CORONARY ARTERY WITH UNSTABLE ANGINA PECTORIS (HCC): Status: RESOLVED | Noted: 2019-01-04 | Resolved: 2021-03-06

## 2021-03-06 PROBLEM — I70.219 ATHEROSCLER NATIVE ARTERIES THE EXTREMITIES W/INTERMIT CLAUDICATION: Status: RESOLVED | Noted: 2020-06-24 | Resolved: 2021-03-06

## 2021-03-06 PROBLEM — I70.219 ATHEROSCLER NATIVE ARTERIES THE EXTREMITIES W/INTERMIT CLAUDICATION (HCC): Status: RESOLVED | Noted: 2020-06-24 | Resolved: 2021-03-06

## 2021-03-06 PROBLEM — E66.9 CLASS 1 OBESITY WITH SERIOUS COMORBIDITY AND BODY MASS INDEX (BMI) OF 34.0 TO 34.9 IN ADULT: Status: ACTIVE | Noted: 2020-02-03

## 2021-03-12 ENCOUNTER — OFFICE VISIT (OUTPATIENT)
Dept: PHYSICAL THERAPY | Facility: HOSPITAL | Age: 71
End: 2021-03-12
Attending: FAMILY MEDICINE
Payer: MEDICARE

## 2021-03-12 DIAGNOSIS — M48.02 FORAMINAL STENOSIS OF CERVICAL REGION: ICD-10-CM

## 2021-03-12 DIAGNOSIS — Z87.39 HISTORY OF HERNIATED INTERVERTEBRAL DISC: ICD-10-CM

## 2021-03-12 DIAGNOSIS — M54.2 NECK PAIN ON LEFT SIDE: ICD-10-CM

## 2021-03-12 PROCEDURE — 97162 PT EVAL MOD COMPLEX 30 MIN: CPT

## 2021-03-12 PROCEDURE — 97110 THERAPEUTIC EXERCISES: CPT

## 2021-03-12 NOTE — PROGRESS NOTES
SPINE EVALUATION:   Referring Physician: Dr. Lula Short  Diagnosis: Left neck pain; foraminal stenosis, herniated discs. ..      Date of Service: 3/12/2021     PATIENT SUMMARY   Jacek Coates is a 79year old male who presents to therapy today with complaints understanding and performs HEP correctly without reported pain. Skilled Physical Therapy is medically necessary to address the above impairments and reach functional goals.      Precautions:  None  OBJECTIVE:   Observation/Posture: forward head, protracted involved/activity limitations, and unstable symptoms including changing pain levels. PLAN OF CARE:    Goals: (to be met in 10 visits)   Improved cervical rotation ROM from baseline allowing improved driving abilities.    Pt will report being pain-free with

## 2021-03-16 DIAGNOSIS — I10 ESSENTIAL HYPERTENSION: ICD-10-CM

## 2021-03-16 RX ORDER — AMLODIPINE BESYLATE 5 MG/1
TABLET ORAL
Qty: 90 TABLET | Refills: 0 | Status: SHIPPED | OUTPATIENT
Start: 2021-03-16 | End: 2021-05-24

## 2021-03-16 NOTE — TELEPHONE ENCOUNTER
Pt requesting refill of   Requested Prescriptions     Pending Prescriptions Disp Refills   • AMLODIPINE BESYLATE 5 MG Oral Tab [Pharmacy Med Name: AMLODIPINE BESYLATE 5 MG Tablet] 90 tablet 0     Sig: TAKE 1 TABLET EVERY DAY       Passed protocol, refill

## 2021-03-17 ENCOUNTER — OFFICE VISIT (OUTPATIENT)
Dept: PHYSICAL THERAPY | Facility: HOSPITAL | Age: 71
End: 2021-03-17
Attending: FAMILY MEDICINE
Payer: MEDICARE

## 2021-03-17 PROCEDURE — 97140 MANUAL THERAPY 1/> REGIONS: CPT

## 2021-03-17 PROCEDURE — 97110 THERAPEUTIC EXERCISES: CPT

## 2021-03-17 NOTE — PROGRESS NOTES
Dx: Left neck pain; cervical foraminal stenosis, hnp         Insurance (Authorized # of Visits):  8           Authorizing Physician: Dr. Catrachito Olivo  Next MD visit: none scheduled  Fall Risk: standard         Precautions: n/a             Subjective: left-aguilar

## 2021-03-19 ENCOUNTER — APPOINTMENT (OUTPATIENT)
Dept: PHYSICAL THERAPY | Facility: HOSPITAL | Age: 71
End: 2021-03-19
Attending: FAMILY MEDICINE
Payer: MEDICARE

## 2021-03-24 ENCOUNTER — OFFICE VISIT (OUTPATIENT)
Dept: PHYSICAL THERAPY | Facility: HOSPITAL | Age: 71
End: 2021-03-24
Attending: FAMILY MEDICINE
Payer: MEDICARE

## 2021-03-24 PROCEDURE — 97110 THERAPEUTIC EXERCISES: CPT

## 2021-03-24 PROCEDURE — 97140 MANUAL THERAPY 1/> REGIONS: CPT

## 2021-03-24 NOTE — PROGRESS NOTES
Dx: Left neck pain; cervical foraminal stenosis, hnp         Insurance (Authorized # of Visits):  8 approved visits.            Authorizing Physician: Dr. Ashley Jacob  Next MD visit: none scheduled  Fall Risk: standard         Precautions: n/a             Subj x 10.   L SF x 10. Supine stm c-t area by PT.  stm c-t area while supine. B SR yellow tubing 2 x 10. HEP. ER with yellow tubing x 10 l/r. Cassandra Master HEP. Standing B scaption to 90 degrees with 3# x 10 reps. . X 10 reps. ..              HEP: ALEXANDRA RO

## 2021-03-26 ENCOUNTER — OFFICE VISIT (OUTPATIENT)
Dept: PHYSICAL THERAPY | Facility: HOSPITAL | Age: 71
End: 2021-03-26
Attending: FAMILY MEDICINE
Payer: MEDICARE

## 2021-03-26 PROCEDURE — 97110 THERAPEUTIC EXERCISES: CPT

## 2021-03-26 PROCEDURE — 97140 MANUAL THERAPY 1/> REGIONS: CPT

## 2021-03-26 NOTE — PROGRESS NOTES
Dx: Left neck pain; cervical foraminal stenosis, hnp         Insurance (Authorized # of Visits):  8 approved visits. Authorizing Physician: Dr. Hector Hollins  Next MD visit: none scheduled  Fall Risk: standard         Precautions: Latex allergy. .. 10.   L Scaption x 10. AA L SF x 10. While right side lying:    Left scapula mobs by PT.   stm left alex-scapular mm by PT.   L ER x 10 x 2.   L Scaption x 10. L SF x 10.  While right side lying:    Left scapula mobs by PT.   stm left alex-scapular mm

## 2021-03-29 ENCOUNTER — TELEPHONE (OUTPATIENT)
Dept: PHYSICAL THERAPY | Facility: HOSPITAL | Age: 71
End: 2021-03-29

## 2021-03-29 ENCOUNTER — APPOINTMENT (OUTPATIENT)
Dept: PHYSICAL THERAPY | Facility: HOSPITAL | Age: 71
End: 2021-03-29
Attending: FAMILY MEDICINE
Payer: MEDICARE

## 2021-03-30 ENCOUNTER — OFFICE VISIT (OUTPATIENT)
Dept: PHYSICAL THERAPY | Facility: HOSPITAL | Age: 71
End: 2021-03-30
Attending: FAMILY MEDICINE
Payer: MEDICARE

## 2021-03-30 PROCEDURE — 97110 THERAPEUTIC EXERCISES: CPT

## 2021-03-30 PROCEDURE — 97140 MANUAL THERAPY 1/> REGIONS: CPT

## 2021-03-30 NOTE — PROGRESS NOTES
Dx: Left neck pain; cervical foraminal stenosis, hnp         Insurance (Authorized # of Visits):  8 approved visits. Authorizing Physician: Dr. Luis Miguel Herrera  Next MD visit: none scheduled  Fall Risk: standard         Precautions: Latex allergy. .. Supine left scalene stretching by PT. Supine left scalene stretching by PT. Supine left scalene stretching by PT. While right side lying:    Left scapula mobs by PT.   stm left alex-scapular mm by PT.   L ER 2 x 10. L Scaption x 10.    AA L SF x 1

## 2021-03-31 ENCOUNTER — OFFICE VISIT (OUTPATIENT)
Dept: PHYSICAL THERAPY | Facility: HOSPITAL | Age: 71
End: 2021-03-31
Attending: FAMILY MEDICINE
Payer: MEDICARE

## 2021-03-31 PROCEDURE — 97110 THERAPEUTIC EXERCISES: CPT

## 2021-03-31 PROCEDURE — 97140 MANUAL THERAPY 1/> REGIONS: CPT

## 2021-03-31 NOTE — PROGRESS NOTES
Dx: Left neck pain; cervical foraminal stenosis, hnp         Insurance (Authorized # of Visits):  8 approved visits. Authorizing Physician: Dr. Ellen Edgar MD visit: none scheduled  Fall Risk: standard         Precautions: Latex allergy. .. PROM by PT. Left shoulder A and PROM. Carlton Aylin Carlton Aylin Left shoulder A/PROM Left shoulder A and PROM. Supine left protraction 2 x 10 with TC from PT. Supine L PUP 3# 2 x 10. Supine L PUP 3# 2 x 10 with TC given prn. . HEP. 3# 2 x 10.       Supine left scalene stre

## 2021-04-02 ENCOUNTER — APPOINTMENT (OUTPATIENT)
Dept: PHYSICAL THERAPY | Facility: HOSPITAL | Age: 71
End: 2021-04-02
Attending: FAMILY MEDICINE
Payer: MEDICARE

## 2021-04-07 ENCOUNTER — OFFICE VISIT (OUTPATIENT)
Dept: PHYSICAL THERAPY | Facility: HOSPITAL | Age: 71
End: 2021-04-07
Attending: FAMILY MEDICINE
Payer: MEDICARE

## 2021-04-07 PROCEDURE — 97110 THERAPEUTIC EXERCISES: CPT

## 2021-04-07 PROCEDURE — 97140 MANUAL THERAPY 1/> REGIONS: CPT

## 2021-04-07 NOTE — PROGRESS NOTES
Dx: Left neck pain; cervical foraminal stenosis, hnp         Insurance (Authorized # of Visits):  8 approved visits. Authorizing Physician: Dr. Fany Edgar MD visit: none scheduled  Fall Risk: standard         Precautions: Latex allergy. .. shoulder PROM by PT. Left shoulder PROM by PT. Left shoulder A and PROM. Kaila Spar Kaila Spar Left shoulder A/PROM Left shoulder A and PROM. Left shoulder A/PROM. Supine left protraction 2 x 10 with TC from PT. Supine L PUP 3# 2 x 10.   Supine L PUP 3# 2 x 10 with TC cervical retraction x 10 reps. Arch Ailyn HEP: SF ROM, pectoralis stretch, B SR. Charges: Kandy Mejias.        Total Timed Treatment: 40 min  Total Treatment Time: 40 min

## 2021-04-09 ENCOUNTER — OFFICE VISIT (OUTPATIENT)
Dept: PHYSICAL THERAPY | Facility: HOSPITAL | Age: 71
End: 2021-04-09
Attending: FAMILY MEDICINE
Payer: MEDICARE

## 2021-04-09 PROCEDURE — 97110 THERAPEUTIC EXERCISES: CPT

## 2021-04-09 PROCEDURE — 97140 MANUAL THERAPY 1/> REGIONS: CPT

## 2021-04-09 NOTE — PROGRESS NOTES
Dx: Left neck pain; cervical foraminal stenosis, hnp         Insurance (Authorized # of Visits):  8 approved visits. Authorizing Physician: Dr. Mi Edgar MD visit: none scheduled/tbd/prn. ..   Fall Risk: standard         Precautions: Latex all level 2 random for 3' F and 3' B... SciFit (8) UEs level 2 random for 3' F and 3' B... Sci Fit (8) UEs level 2 random for 3' F and 3' B... Supine cervical traction by PT. Supine cervical traction by PT. Supine manual cervical traction by PT.   Supine ma c-t area by PT. X.    HEP was discussed and questions were answered. Supine stm c-t area by PT.  stm c-t area while supine. stm c-t area while supine. stm c-t area by PT while supine. stm c-t area by PT while supine. stm c-t area by PT while supine.

## 2021-05-24 DIAGNOSIS — E78.2 HYPERLIPIDEMIA, MIXED: ICD-10-CM

## 2021-05-24 DIAGNOSIS — E11.51 DM (DIABETES MELLITUS) TYPE II, CONTROLLED, WITH PERIPHERAL VASCULAR DISORDER (HCC): ICD-10-CM

## 2021-05-24 DIAGNOSIS — I73.9 PAD (PERIPHERAL ARTERY DISEASE) (HCC): ICD-10-CM

## 2021-05-24 DIAGNOSIS — I10 ESSENTIAL HYPERTENSION: ICD-10-CM

## 2021-05-24 DIAGNOSIS — I25.110 CORONARY ARTERY DISEASE INVOLVING NATIVE CORONARY ARTERY OF NATIVE HEART WITH UNSTABLE ANGINA PECTORIS (HCC): ICD-10-CM

## 2021-05-24 DIAGNOSIS — Z95.1 S/P CABG (CORONARY ARTERY BYPASS GRAFT): ICD-10-CM

## 2021-05-24 RX ORDER — METFORMIN HYDROCHLORIDE 500 MG/1
TABLET, EXTENDED RELEASE ORAL
Qty: 90 TABLET | Refills: 0 | Status: SHIPPED | OUTPATIENT
Start: 2021-05-24 | End: 2021-06-07

## 2021-05-24 RX ORDER — METOPROLOL SUCCINATE 50 MG/1
TABLET, EXTENDED RELEASE ORAL
Qty: 90 TABLET | Refills: 0 | Status: SHIPPED | OUTPATIENT
Start: 2021-05-24 | End: 2021-06-07

## 2021-05-24 RX ORDER — AMLODIPINE BESYLATE 5 MG/1
TABLET ORAL
Qty: 90 TABLET | Refills: 0 | Status: SHIPPED | OUTPATIENT
Start: 2021-05-24 | End: 2021-06-07

## 2021-05-24 RX ORDER — EZETIMIBE 10 MG/1
TABLET ORAL
Qty: 90 TABLET | Refills: 0 | Status: SHIPPED | OUTPATIENT
Start: 2021-05-24 | End: 2021-06-07

## 2021-05-24 NOTE — TELEPHONE ENCOUNTER
Pt requesting refill of   Requested Prescriptions     Pending Prescriptions Disp Refills   • EZETIMIBE 10 MG Oral Tab [Pharmacy Med Name: EZETIMIBE 10 MG Tablet] 90 tablet 0     Sig: TAKE 1 TABLET EVERY NIGHT   • METOPROLOL SUCCINATE ER 50 MG Oral Tablet 2

## 2021-06-02 PROCEDURE — 3061F NEG MICROALBUMINURIA REV: CPT | Performed by: FAMILY MEDICINE

## 2021-06-02 PROCEDURE — 3044F HG A1C LEVEL LT 7.0%: CPT | Performed by: FAMILY MEDICINE

## 2021-06-07 ENCOUNTER — TELEPHONE (OUTPATIENT)
Dept: FAMILY MEDICINE CLINIC | Facility: CLINIC | Age: 71
End: 2021-06-07

## 2021-06-07 ENCOUNTER — OFFICE VISIT (OUTPATIENT)
Dept: FAMILY MEDICINE CLINIC | Facility: CLINIC | Age: 71
End: 2021-06-07
Payer: MEDICARE

## 2021-06-07 VITALS
OXYGEN SATURATION: 94 % | BODY MASS INDEX: 32.87 KG/M2 | HEIGHT: 73 IN | WEIGHT: 248 LBS | TEMPERATURE: 98 F | SYSTOLIC BLOOD PRESSURE: 120 MMHG | DIASTOLIC BLOOD PRESSURE: 64 MMHG | RESPIRATION RATE: 18 BRPM | HEART RATE: 67 BPM

## 2021-06-07 DIAGNOSIS — M25.551 RIGHT HIP PAIN: ICD-10-CM

## 2021-06-07 DIAGNOSIS — E78.2 HYPERLIPIDEMIA, MIXED: ICD-10-CM

## 2021-06-07 DIAGNOSIS — I10 ESSENTIAL HYPERTENSION: ICD-10-CM

## 2021-06-07 DIAGNOSIS — I73.9 PAD (PERIPHERAL ARTERY DISEASE) (HCC): ICD-10-CM

## 2021-06-07 DIAGNOSIS — I77.9 BILATERAL CAROTID ARTERY DISEASE, UNSPECIFIED TYPE (HCC): ICD-10-CM

## 2021-06-07 DIAGNOSIS — I25.118 CORONARY ARTERY DISEASE OF NATIVE ARTERY OF NATIVE HEART WITH STABLE ANGINA PECTORIS (HCC): ICD-10-CM

## 2021-06-07 DIAGNOSIS — E11.51 DM (DIABETES MELLITUS) TYPE II, CONTROLLED, WITH PERIPHERAL VASCULAR DISORDER (HCC): Primary | ICD-10-CM

## 2021-06-07 DIAGNOSIS — Z95.828 STATUS POST INSERTION OF ILIAC ARTERY STENT: ICD-10-CM

## 2021-06-07 DIAGNOSIS — Z95.1 S/P CABG (CORONARY ARTERY BYPASS GRAFT): ICD-10-CM

## 2021-06-07 DIAGNOSIS — I70.203 BILATERAL ATHEROSCLEROSIS OF LEGS (HCC): ICD-10-CM

## 2021-06-07 PROBLEM — R06.09 DOE (DYSPNEA ON EXERTION): Status: RESOLVED | Noted: 2020-09-15 | Resolved: 2021-06-07

## 2021-06-07 PROBLEM — R06.00 DOE (DYSPNEA ON EXERTION): Status: RESOLVED | Noted: 2020-09-15 | Resolved: 2021-06-07

## 2021-06-07 PROCEDURE — 99214 OFFICE O/P EST MOD 30 MIN: CPT | Performed by: FAMILY MEDICINE

## 2021-06-07 PROCEDURE — 3074F SYST BP LT 130 MM HG: CPT | Performed by: FAMILY MEDICINE

## 2021-06-07 PROCEDURE — 3008F BODY MASS INDEX DOCD: CPT | Performed by: FAMILY MEDICINE

## 2021-06-07 PROCEDURE — 3078F DIAST BP <80 MM HG: CPT | Performed by: FAMILY MEDICINE

## 2021-06-07 RX ORDER — AMLODIPINE BESYLATE 5 MG/1
5 TABLET ORAL DAILY
Qty: 90 TABLET | Refills: 0 | Status: SHIPPED | OUTPATIENT
Start: 2021-06-07 | End: 2021-09-07

## 2021-06-07 RX ORDER — METFORMIN HYDROCHLORIDE 500 MG/1
500 TABLET, EXTENDED RELEASE ORAL
Qty: 90 TABLET | Refills: 0 | Status: SHIPPED | OUTPATIENT
Start: 2021-06-07 | End: 2021-09-07

## 2021-06-07 RX ORDER — METOPROLOL SUCCINATE 50 MG/1
50 TABLET, EXTENDED RELEASE ORAL DAILY
Qty: 90 TABLET | Refills: 0 | Status: SHIPPED | OUTPATIENT
Start: 2021-06-07 | End: 2021-09-07

## 2021-06-07 RX ORDER — LISINOPRIL 5 MG/1
5 TABLET ORAL DAILY
Qty: 90 TABLET | Refills: 0 | Status: SHIPPED | OUTPATIENT
Start: 2021-06-07 | End: 2021-09-07

## 2021-06-07 RX ORDER — ROSUVASTATIN CALCIUM 40 MG/1
40 TABLET, COATED ORAL NIGHTLY
Qty: 90 TABLET | Refills: 0 | Status: SHIPPED | OUTPATIENT
Start: 2021-06-07 | End: 2021-09-07

## 2021-06-07 RX ORDER — EZETIMIBE 10 MG/1
10 TABLET ORAL NIGHTLY
Qty: 90 TABLET | Refills: 0 | Status: SHIPPED | OUTPATIENT
Start: 2021-06-07 | End: 2021-09-07

## 2021-06-07 NOTE — TELEPHONE ENCOUNTER
[facility]ren [facility] to [facility] in [facility]. Patient requesting to [facility]. Correct script. Patient notified.

## 2021-06-07 NOTE — PROGRESS NOTES
Patient presents with:  Diabetes  Hypertension    DM, HL, HTN f/u. HPI:   Avtar Gaston is a 79year old male who presents for recheck of his diabetes, HTN, lipids. Patient’s FBS have been 100-110- occasionally checks. Post prandial not checking. worked well but very expensive. tolerating ezetimibe with statin. He feels well on rosuvastatin. Status post post femoral artery stent bilateral-7/2020 with Dr. Johnston Fears due to bilateral leg claudication. Left leg pain x10 years has completely resolved. able to move his arms or perform daily activities. He has no fever or chills. He uses Tylenol arthritis which seems to help the pain.     Paternal first cousin and paternal uncle had prostate cancer.   Patient's denying any issues with urination or concer (U/L)   Date Value   06/02/2021 25   11/05/2020 53   07/17/2020 75 (H)   06/25/2020 53   05/27/2020 48 (H)   12/31/2019 50 (H)        Current Outpatient Medications   Medication Sig Dispense Refill   • EZETIMIBE 10 MG Oral Tab TAKE 1 TABLET EVERY NIGHT 90 • Easy bruising    • Erythrocytosis 2/9/2017   • Frequent urination    • Hearing impairment     bilateral hearing aids   • Heart attack [facility] 2003   • Hemorrhoids    • High blood pressure    • High cholesterol    • History of kidney stones 12/30/2014   • • Other (diabetes mellitus) Mother        Adhesive Tape           ITCHING    Comment:Redness/itching      EXAM:   /64 (BP Location: Left arm, Patient Position: Sitting, Cuff Size: adult)   Pulse 67   Temp 98 °F (36.7 °C) (Temporal)   Resp 18   Ht 6 06/02/2021 6.9    • ALBUMIN 06/02/2021 4.2    • GLOBULIN 06/02/2021 2.7    • ALBUMIN/GLOBULIN RATIO 06/02/2021 1.6    • BILIRUBIN, TOTAL 06/02/2021 0.7    • ALKALINE PHOSPHATASE 06/02/2021 61    • AST 06/02/2021 24    • ALT 06/02/2021 25    • CHOLESTEROL, (5 mg total) by mouth daily. Dispense: 90 tablet;  Refill: 0  Controlled  Continue weight loss  Continue daily foot exams  contintue regular exercise 3x weekly or more for 30 mins  Last ophthalmologist DM exam 8/2020-no DBR -required annual  Immunizations Dr. Isatu Park 12/20    7. PAD (peripheral artery disease) ([facility] [address]  8. Bilateral atherosclerosis of legs ([facility] [address]  9. Status post insertion of iliac artery stent-left leg  - ORTHOPEDIC - INTERNAL-[place]  - ezetimibe 10 MG Oral Tab;  Take 1 tablet (10 mg total) by m loss, restart walking once hip issues are resolved      12.  Need for vaccination  -Shingrix vaccine    Prior visit  Tubular adenoma of colon  - GASTRO - INTERNAL-Kastin due 1/2021 and then interval was increased to 9/3/2021 for repeat colonoscopy-patient s

## 2021-07-13 ENCOUNTER — TELEPHONE (OUTPATIENT)
Dept: CARDIOLOGY | Age: 71
End: 2021-07-13

## 2021-07-19 ENCOUNTER — TELEPHONE (OUTPATIENT)
Dept: FAMILY MEDICINE CLINIC | Facility: CLINIC | Age: 71
End: 2021-07-19

## 2021-07-19 DIAGNOSIS — D12.6 TUBULAR ADENOMA OF COLON: Primary | ICD-10-CM

## 2021-07-19 DIAGNOSIS — Z85.038 ENCOUNTER FOR COLONOSCOPY DUE TO HISTORY OF COLON CANCER: ICD-10-CM

## 2021-07-19 DIAGNOSIS — Z12.11 ENCOUNTER FOR COLONOSCOPY DUE TO HISTORY OF COLON CANCER: ICD-10-CM

## 2021-07-19 NOTE — TELEPHONE ENCOUNTER
Last appt for annual exam 3/5/21  Pt due for colonoscopy 9/2021  Referral pending.    Please review and sign if ok

## 2021-09-07 ENCOUNTER — OFFICE VISIT (OUTPATIENT)
Dept: FAMILY MEDICINE CLINIC | Facility: CLINIC | Age: 71
End: 2021-09-07
Payer: MEDICARE

## 2021-09-07 VITALS
TEMPERATURE: 97 F | OXYGEN SATURATION: 94 % | HEIGHT: 73 IN | SYSTOLIC BLOOD PRESSURE: 122 MMHG | DIASTOLIC BLOOD PRESSURE: 80 MMHG | WEIGHT: 258 LBS | HEART RATE: 74 BPM | RESPIRATION RATE: 16 BRPM | BODY MASS INDEX: 34.19 KG/M2

## 2021-09-07 DIAGNOSIS — Z95.828 STATUS POST INSERTION OF ILIAC ARTERY STENT: ICD-10-CM

## 2021-09-07 DIAGNOSIS — N40.0 BENIGN PROSTATIC HYPERPLASIA WITHOUT LOWER URINARY TRACT SYMPTOMS: ICD-10-CM

## 2021-09-07 DIAGNOSIS — Z12.5 SCREENING FOR PROSTATE CANCER: ICD-10-CM

## 2021-09-07 DIAGNOSIS — I77.9 BILATERAL CAROTID ARTERY DISEASE, UNSPECIFIED TYPE (HCC): ICD-10-CM

## 2021-09-07 DIAGNOSIS — I71.4 ABDOMINAL AORTIC ANEURYSM (AAA) WITHOUT RUPTURE (HCC): ICD-10-CM

## 2021-09-07 DIAGNOSIS — I73.9 PAD (PERIPHERAL ARTERY DISEASE) (HCC): ICD-10-CM

## 2021-09-07 DIAGNOSIS — I10 ESSENTIAL HYPERTENSION: ICD-10-CM

## 2021-09-07 DIAGNOSIS — Z99.89 OSA ON CPAP: ICD-10-CM

## 2021-09-07 DIAGNOSIS — D12.6 TUBULAR ADENOMA OF COLON: ICD-10-CM

## 2021-09-07 DIAGNOSIS — Z23 NEED FOR VACCINATION: ICD-10-CM

## 2021-09-07 DIAGNOSIS — G47.33 OSA ON CPAP: ICD-10-CM

## 2021-09-07 DIAGNOSIS — I70.203 BILATERAL ATHEROSCLEROSIS OF LEGS (HCC): ICD-10-CM

## 2021-09-07 DIAGNOSIS — M47.812 FACET ARTHRITIS OF CERVICAL REGION: ICD-10-CM

## 2021-09-07 DIAGNOSIS — Z95.1 S/P CABG (CORONARY ARTERY BYPASS GRAFT): ICD-10-CM

## 2021-09-07 DIAGNOSIS — E78.2 HYPERLIPIDEMIA, MIXED: ICD-10-CM

## 2021-09-07 DIAGNOSIS — M70.61 TROCHANTERIC BURSITIS OF RIGHT HIP: ICD-10-CM

## 2021-09-07 DIAGNOSIS — E11.51 DM (DIABETES MELLITUS) TYPE II, CONTROLLED, WITH PERIPHERAL VASCULAR DISORDER (HCC): Primary | ICD-10-CM

## 2021-09-07 DIAGNOSIS — I25.118 CORONARY ARTERY DISEASE OF NATIVE ARTERY OF NATIVE HEART WITH STABLE ANGINA PECTORIS (HCC): ICD-10-CM

## 2021-09-07 PROCEDURE — 3074F SYST BP LT 130 MM HG: CPT | Performed by: FAMILY MEDICINE

## 2021-09-07 PROCEDURE — 99214 OFFICE O/P EST MOD 30 MIN: CPT | Performed by: FAMILY MEDICINE

## 2021-09-07 PROCEDURE — 3079F DIAST BP 80-89 MM HG: CPT | Performed by: FAMILY MEDICINE

## 2021-09-07 PROCEDURE — 3008F BODY MASS INDEX DOCD: CPT | Performed by: FAMILY MEDICINE

## 2021-09-07 RX ORDER — METOPROLOL SUCCINATE 50 MG/1
50 TABLET, EXTENDED RELEASE ORAL DAILY
Qty: 90 TABLET | Refills: 0 | Status: SHIPPED | OUTPATIENT
Start: 2021-09-07

## 2021-09-07 RX ORDER — AMLODIPINE BESYLATE 5 MG/1
5 TABLET ORAL DAILY
Qty: 90 TABLET | Refills: 0 | Status: SHIPPED | OUTPATIENT
Start: 2021-09-07

## 2021-09-07 RX ORDER — EZETIMIBE 10 MG/1
10 TABLET ORAL NIGHTLY
Qty: 90 TABLET | Refills: 0 | Status: SHIPPED | OUTPATIENT
Start: 2021-09-07

## 2021-09-07 RX ORDER — ROSUVASTATIN CALCIUM 40 MG/1
40 TABLET, COATED ORAL NIGHTLY
Qty: 90 TABLET | Refills: 0 | Status: SHIPPED | OUTPATIENT
Start: 2021-09-07

## 2021-09-07 RX ORDER — METFORMIN HYDROCHLORIDE 500 MG/1
500 TABLET, EXTENDED RELEASE ORAL
Qty: 90 TABLET | Refills: 0 | Status: SHIPPED | OUTPATIENT
Start: 2021-09-07

## 2021-09-07 RX ORDER — LISINOPRIL 5 MG/1
5 TABLET ORAL DAILY
Qty: 90 TABLET | Refills: 0 | Status: SHIPPED | OUTPATIENT
Start: 2021-09-07 | End: 2022-01-19

## 2021-09-07 NOTE — PROGRESS NOTES
No chief complaint on file. DM, HL, HTN f/u. HPI:   Greg Durán is a 70year old male who presents for recheck of his diabetes, HTN, lipids. Needs multiple referrals for cardiologist, ophthalmologist, pulmonologist, urologist, CV surgeon.   Yareli Gilliland recheck of hyperlipidemia. Patient reports taking medications as instructed, no medication side effects noted. Denies any generalized muscle aches.   History of stent-denies chest pain or shortness of breath has appointment with Dr. Santosh Sparks on 9/27/2021Cjurgen Mejias side- radiates to head- needs 2 tylenol then resolves and using diclofenac's which helps moderately. Wants refill. . Pain is moderate without radiation to arms. Denies weakness in arms or legs worse. Had symptoms for several years.   States former orthoped 12/31/2019 6.3 (H)     HgbA1C (%)   Date Value   11/05/2020 6.5 (H)   07/17/2020 6.5 (H)     Cholesterol, Total (mg/dL)   Date Value   11/05/2020 128     CHOLESTEROL, TOTAL (mg/dL)   Date Value   06/02/2021 129   05/27/2020 148   12/31/2019 156     HDL C mouth daily. 30 tablet 0   • acetaminophen (TYLENOL EXTRA STRENGTH) 500 MG Oral Tab Take 500 mg by mouth every 6 (six) hours as needed for Pain. (Patient not taking: Reported on 6/7/2021 )     • aspirin 81 MG Oral Tab EC Take 81 mg by mouth daily. eye-Dr. Severiano Cloud   • CATARACT     • CHOLECYSTECTOMY  2003   • COLONOSCOPY  2003   • COLONOSCOPY N/A 1/30/2015    Procedure: COLONOSCOPY;  Surgeon: Meghana Reed MD;  Location: [facility] ENDOSCOPY   • COLONOSCOPY N/A 1/17/2020    Procedure: COLONOSCOPY;  Surg test.  Musculoskeletal: Lumbar spine is nontender. Vascular: Barely palpable DP bilateral, extremities are warm, no carotid bruits on exam bilateral  Skin: is unremarkable with no rashes.     Neuro: no focal abnormalities, and reflexes coordination and gai 06/02/2021 561    • ABSOLUTE EOSINOPHILS 06/02/2021 112    • ABSOLUTE BASOPHILS 06/02/2021 40    • NEUTROPHILS 06/02/2021 53    • LYMPHOCYTES 06/02/2021 36.2    • MONOCYTES 06/02/2021 8.5    • EOSINOPHILS 06/02/2021 1.7    • BASOPHILS 06/02/2021 0.6    • T carotid artery disease, unspecified type ([facility] [address]  6. S/P CABG (coronary artery bypass graft)  15. Abdominal aortic aneurysm (AAA) without rupture (HCC)  - ezetimibe 10 MG Oral Tab; Take 1 tablet (10 mg total) by mouth nightly. Dispense: 90 tablet;  Refill: 0 superficial.  States orthopedist agreed not related to peripheral vascular disease.   Pain is superficial and reproducible over the greater right trochanter area and extends down the leg  Last x-ray of the hips 9/28/2019 showed joint spaces were symmetric w tablet 0     Sig: Take 1 tablet (10 mg total) by mouth nightly. • metoprolol succinate 50 MG Oral Tablet 24 Hr 90 tablet 0     Sig: Take 1 tablet (50 mg total) by mouth daily.    • amLODIPine 5 MG Oral Tab 90 tablet 0     Sig: Take 1 tablet (5 mg total) b

## 2021-09-07 NOTE — PATIENT INSTRUCTIONS
WW[facility]. ORG TO SCHEDULE COVID 19 VACCINE      CLICK ON SCHEDULE COVID 19 VACCINE ON TOP BANNER OF WEBPAGE. If you have received vaccinations recently ,must wait 2 weeks before receiving first COVID-19 vaccination.

## 2021-09-10 LAB
ALBUMIN/GLOBULIN RATIO: 1.7 (CALC) (ref 1–2.5)
ALBUMIN: 4.1 G/DL (ref 3.6–5.1)
ALKALINE PHOSPHATASE: 59 U/L (ref 35–144)
ALT: 23 U/L (ref 9–46)
AST: 21 U/L (ref 10–35)
BILIRUBIN, TOTAL: 0.7 MG/DL (ref 0.2–1.2)
BUN: 17 MG/DL (ref 7–25)
CALCIUM: 9.7 MG/DL (ref 8.6–10.3)
CARBON DIOXIDE: 24 MMOL/L (ref 20–32)
CHLORIDE: 105 MMOL/L (ref 98–110)
CHOL/HDLC RATIO: 2.5 (CALC)
CHOLESTEROL, TOTAL: 131 MG/DL
CREATININE: 0.9 MG/DL (ref 0.7–1.18)
EGFR IF AFRICN AM: 99 ML/MIN/1.73M2
EGFR IF NONAFRICN AM: 86 ML/MIN/1.73M2
GLOBULIN: 2.4 G/DL (CALC) (ref 1.9–3.7)
GLUCOSE: 116 MG/DL (ref 65–99)
HDL CHOLESTEROL: 52 MG/DL
HEMOGLOBIN A1C: 5.9 % OF TOTAL HGB
LDL-CHOLESTEROL: 61 MG/DL (CALC)
NON-HDL CHOLESTEROL: 79 MG/DL (CALC)
POTASSIUM: 4.5 MMOL/L (ref 3.5–5.3)
PROTEIN, TOTAL: 6.5 G/DL (ref 6.1–8.1)
PSA, TOTAL: 0.8 NG/ML
SODIUM: 139 MMOL/L (ref 135–146)
TRIGLYCERIDES: 93 MG/DL

## 2021-10-06 ENCOUNTER — HOSPITAL ENCOUNTER (OUTPATIENT)
Dept: GENERAL RADIOLOGY | Facility: HOSPITAL | Age: 71
Discharge: HOME | End: 2021-10-06
Attending: UROLOGY
Payer: MEDICARE

## 2021-10-06 DIAGNOSIS — N20.0 RENAL CALCULUS, BILATERAL: ICD-10-CM

## 2021-10-06 PROCEDURE — 74018 RADEX ABDOMEN 1 VIEW: CPT | Performed by: UROLOGY

## 2021-10-11 DIAGNOSIS — E11.51 DM (DIABETES MELLITUS) TYPE II, CONTROLLED, WITH PERIPHERAL VASCULAR DISORDER (HCC): ICD-10-CM

## 2021-10-11 DIAGNOSIS — I10 ESSENTIAL HYPERTENSION: ICD-10-CM

## 2021-10-11 RX ORDER — LISINOPRIL 5 MG/1
TABLET ORAL
Qty: 90 TABLET | Refills: 0 | OUTPATIENT
Start: 2021-10-11

## 2021-10-11 NOTE — TELEPHONE ENCOUNTER
Refill requested too soon:     Pt requesting refill of   Requested Prescriptions     Pending Prescriptions Disp Refills   • LISINOPRIL 5 MG Oral Tab [Pharmacy Med Name: LISINOPRIL 5 MG Tablet] 90 tablet 0     Sig: TAKE 1 TABLET EVERY DAY         Last Time

## 2021-10-12 RX ORDER — LANCETS
1 EACH MISCELLANEOUS 2 TIMES DAILY
Qty: 102 EACH | Refills: 3 | Status: SHIPPED | OUTPATIENT
Start: 2021-10-12 | End: 2022-10-12

## 2021-10-12 NOTE — TELEPHONE ENCOUNTER
Refill Passed Protocol:     Pt requesting refill of   Requested Prescriptions     Pending Prescriptions Disp Refills   • Accu-Chek FastClix Lancets Does not apply Misc 102 each 3     Si lancet by Finger stick route 2 (two) times daily. Use as directed.

## 2021-11-15 ENCOUNTER — LAB ENCOUNTER (OUTPATIENT)
Dept: LAB | Facility: HOSPITAL | Age: 71
End: 2021-11-15
Payer: MEDICARE

## 2021-11-15 DIAGNOSIS — K57.90 DIVERTICULOSIS: ICD-10-CM

## 2021-11-17 ENCOUNTER — HOSPITAL ENCOUNTER (OUTPATIENT)
Facility: HOSPITAL | Age: 71
Setting detail: HOSPITAL OUTPATIENT SURGERY
Discharge: HOME | End: 2021-11-17
Attending: INTERNAL MEDICINE | Admitting: INTERNAL MEDICINE
Payer: MEDICARE

## 2021-11-17 VITALS
HEART RATE: 64 BPM | HEIGHT: 73 IN | BODY MASS INDEX: 33.13 KG/M2 | WEIGHT: 250 LBS | RESPIRATION RATE: 18 BRPM | TEMPERATURE: 98 F | DIASTOLIC BLOOD PRESSURE: 67 MMHG | OXYGEN SATURATION: 95 % | SYSTOLIC BLOOD PRESSURE: 138 MMHG

## 2021-11-17 DIAGNOSIS — Z98.890 HISTORY OF COLONOSCOPY: ICD-10-CM

## 2021-11-17 DIAGNOSIS — K57.90 DIVERTICULOSIS: Primary | ICD-10-CM

## 2021-11-17 PROCEDURE — 82962 GLUCOSE BLOOD TEST: CPT

## 2021-11-17 RX ORDER — SODIUM CHLORIDE, SODIUM LACTATE, POTASSIUM CHLORIDE, CALCIUM CHLORIDE 600; 310; 30; 20 MG/100ML; MG/100ML; MG/100ML; MG/100ML
INJECTION, SOLUTION INTRAVENOUS CONTINUOUS
Status: DISCONTINUED | OUTPATIENT
Start: 2021-11-17 | End: 2021-11-17

## 2021-11-17 RX ORDER — DEXTROSE MONOHYDRATE 25 G/50ML
50 INJECTION, SOLUTION INTRAVENOUS
Status: DISCONTINUED | OUTPATIENT
Start: 2021-11-17 | End: 2021-11-17

## 2021-11-22 ENCOUNTER — IMMUNIZATION (OUTPATIENT)
Dept: LAB | Facility: HOSPITAL | Age: 71
End: 2021-11-22
Attending: EMERGENCY MEDICINE
Payer: MEDICARE

## 2021-11-22 DIAGNOSIS — Z23 NEED FOR VACCINATION: Primary | ICD-10-CM

## 2021-11-22 PROCEDURE — 0004A SARSCOV2 VAC 30MCG/0.3ML IM: CPT

## 2021-11-24 ENCOUNTER — IMMUNIZATION (OUTPATIENT)
Dept: FAMILY MEDICINE CLINIC | Facility: CLINIC | Age: 71
End: 2021-11-24
Payer: MEDICARE

## 2021-11-24 DIAGNOSIS — Z23 NEED FOR VACCINATION: Primary | ICD-10-CM

## 2021-11-24 PROCEDURE — G0008 ADMIN INFLUENZA VIRUS VAC: HCPCS | Performed by: FAMILY MEDICINE

## 2021-11-24 PROCEDURE — 90662 IIV NO PRSV INCREASED AG IM: CPT | Performed by: FAMILY MEDICINE

## 2022-01-11 ENCOUNTER — LAB ENCOUNTER (OUTPATIENT)
Dept: LAB | Facility: HOSPITAL | Age: 72
End: 2022-01-11
Attending: INTERNAL MEDICINE
Payer: MEDICARE

## 2022-01-11 DIAGNOSIS — K57.90 DIVERTICULOSIS: ICD-10-CM

## 2022-01-12 LAB — SARS-COV-2 RNA RESP QL NAA+PROBE: NOT DETECTED

## 2022-01-14 ENCOUNTER — ANESTHESIA EVENT (OUTPATIENT)
Dept: ENDOSCOPY | Facility: HOSPITAL | Age: 72
End: 2022-01-14
Payer: MEDICARE

## 2022-01-14 ENCOUNTER — HOSPITAL ENCOUNTER (OUTPATIENT)
Facility: HOSPITAL | Age: 72
Setting detail: HOSPITAL OUTPATIENT SURGERY
Discharge: HOME | End: 2022-01-14
Attending: INTERNAL MEDICINE | Admitting: INTERNAL MEDICINE
Payer: MEDICARE

## 2022-01-14 ENCOUNTER — ANESTHESIA (OUTPATIENT)
Dept: ENDOSCOPY | Facility: HOSPITAL | Age: 72
End: 2022-01-14
Payer: MEDICARE

## 2022-01-14 VITALS
DIASTOLIC BLOOD PRESSURE: 60 MMHG | OXYGEN SATURATION: 96 % | HEART RATE: 64 BPM | WEIGHT: 250 LBS | BODY MASS INDEX: 33.13 KG/M2 | RESPIRATION RATE: 19 BRPM | HEIGHT: 73 IN | SYSTOLIC BLOOD PRESSURE: 116 MMHG

## 2022-01-14 DIAGNOSIS — Z98.890 HISTORY OF COLONOSCOPY: ICD-10-CM

## 2022-01-14 DIAGNOSIS — K57.90 DIVERTICULOSIS: Primary | ICD-10-CM

## 2022-01-14 LAB — GLUCOSE BLD-MCNC: 111 MG/DL (ref 70–99)

## 2022-01-14 PROCEDURE — 0DBK8ZX EXCISION OF ASCENDING COLON, VIA NATURAL OR ARTIFICIAL OPENING ENDOSCOPIC, DIAGNOSTIC: ICD-10-PCS | Performed by: INTERNAL MEDICINE

## 2022-01-14 PROCEDURE — 88305 TISSUE EXAM BY PATHOLOGIST: CPT | Performed by: INTERNAL MEDICINE

## 2022-01-14 PROCEDURE — 82962 GLUCOSE BLOOD TEST: CPT

## 2022-01-14 RX ORDER — NALOXONE HYDROCHLORIDE 0.4 MG/ML
80 INJECTION, SOLUTION INTRAMUSCULAR; INTRAVENOUS; SUBCUTANEOUS AS NEEDED
Status: DISCONTINUED | OUTPATIENT
Start: 2022-01-14 | End: 2022-01-14

## 2022-01-14 RX ORDER — SODIUM CHLORIDE, SODIUM LACTATE, POTASSIUM CHLORIDE, CALCIUM CHLORIDE 600; 310; 30; 20 MG/100ML; MG/100ML; MG/100ML; MG/100ML
INJECTION, SOLUTION INTRAVENOUS CONTINUOUS
Status: DISCONTINUED | OUTPATIENT
Start: 2022-01-14 | End: 2022-01-14

## 2022-01-14 RX ORDER — METOCLOPRAMIDE HYDROCHLORIDE 5 MG/ML
10 INJECTION INTRAMUSCULAR; INTRAVENOUS AS NEEDED
Status: DISCONTINUED | OUTPATIENT
Start: 2022-01-14 | End: 2022-01-14

## 2022-01-14 RX ORDER — DEXTROSE MONOHYDRATE 25 G/50ML
50 INJECTION, SOLUTION INTRAVENOUS
Status: DISCONTINUED | OUTPATIENT
Start: 2022-01-14 | End: 2022-01-14

## 2022-01-14 RX ORDER — LIDOCAINE HYDROCHLORIDE 10 MG/ML
INJECTION, SOLUTION EPIDURAL; INFILTRATION; INTRACAUDAL; PERINEURAL AS NEEDED
Status: DISCONTINUED | OUTPATIENT
Start: 2022-01-14 | End: 2022-01-14 | Stop reason: SURG

## 2022-01-14 RX ORDER — EPHEDRINE SULFATE 50 MG/ML
INJECTION INTRAVENOUS AS NEEDED
Status: DISCONTINUED | OUTPATIENT
Start: 2022-01-14 | End: 2022-01-14 | Stop reason: SURG

## 2022-01-14 RX ORDER — HYDROMORPHONE HYDROCHLORIDE 1 MG/ML
0.2 INJECTION, SOLUTION INTRAMUSCULAR; INTRAVENOUS; SUBCUTANEOUS EVERY 5 MIN PRN
Status: DISCONTINUED | OUTPATIENT
Start: 2022-01-14 | End: 2022-01-14

## 2022-01-14 RX ORDER — LABETALOL HYDROCHLORIDE 5 MG/ML
5 INJECTION, SOLUTION INTRAVENOUS EVERY 5 MIN PRN
Status: DISCONTINUED | OUTPATIENT
Start: 2022-01-14 | End: 2022-01-14

## 2022-01-14 RX ORDER — ONDANSETRON 2 MG/ML
4 INJECTION INTRAMUSCULAR; INTRAVENOUS AS NEEDED
Status: DISCONTINUED | OUTPATIENT
Start: 2022-01-14 | End: 2022-01-14

## 2022-01-14 RX ADMIN — SODIUM CHLORIDE, SODIUM LACTATE, POTASSIUM CHLORIDE, CALCIUM CHLORIDE: 600; 310; 30; 20 INJECTION, SOLUTION INTRAVENOUS at 07:59:00

## 2022-01-14 RX ADMIN — SODIUM CHLORIDE, SODIUM LACTATE, POTASSIUM CHLORIDE, CALCIUM CHLORIDE: 600; 310; 30; 20 INJECTION, SOLUTION INTRAVENOUS at 08:17:00

## 2022-01-14 RX ADMIN — LIDOCAINE HYDROCHLORIDE 50 MG: 10 INJECTION, SOLUTION EPIDURAL; INFILTRATION; INTRACAUDAL; PERINEURAL at 08:02:00

## 2022-01-14 RX ADMIN — EPHEDRINE SULFATE 10 MG: 50 INJECTION INTRAVENOUS at 08:15:00

## 2022-01-14 NOTE — ANESTHESIA POSTPROCEDURE EVALUATION
[facility] Patient Status:  Hospital Outpatient Surgery   Age/Gender 70year old male MRN [id number]   Location [address] [address] Attending Nida Perez, [address] [facility] Day # 0 PCP Soraya Bernal DO       Anesthesia

## 2022-01-14 NOTE — OPERATIVE REPORT
[facility] Patient Status:  H[facility]patient [facility]    1950 MRN [id number]   Location [id number] [place] [address] Attending Nida Perez DO   Hosp Day # 0 PCP Soraya Bernal DO       PREOPERATIVE DIAGNOSIS/INDICATION: Claudia Arroyo The mucosa and vascular pattern were normal.   Ascending colon: An 8 mm residual sessile polyp tissue was visualized at the prior endoscopic mucosal resection site. This was removed using cold snare. No residual polyp tissue appeared to remain.    Transvers

## 2022-01-14 NOTE — H&P
History & Physical Examination    Patient Name: Wilian Wilks  MRN: [id number]  [facility]: [id number]  YOB: 1950    Diagnosis: history of large ascending colon polyp s/p prior resection    Present Illness: 69 y/o M history as above presents for Colon tablet, Rfl: 3, 1/13/2022 at Unknown time  psyllium 28 % Oral Powd Pack, Take 1 packet by mouth daily.   , Disp: , Rfl: , Past Week at Unknown time  acetaminophen (TYLENOL EXTRA STRENGTH) 500 MG Oral Tab, Take 500 mg by mouth every 6 (six) hours as needed f bilateral hearing aids   • Heart attack ([facility] [address] 2003   • Hemorrhoids    • High blood pressure    • High cholesterol    • History of kidney stones 12/30/2014   • Hx of motion sickness    • Obesity (BMI 30-39. 9) 5/23/2016   • Obstructive sleep apnea (adult) (p 1967        Quit date: 2003        Years since quittin.7      Smokeless tobacco: Never Used    Alcohol use:  Yes      Alcohol/week: 2.0 standard drinks      Types: 2 Glasses of wine per week      Comment: Occasional      SYSTEM Check if Review

## 2022-01-14 NOTE — ANESTHESIA PREPROCEDURE EVALUATION
PRE-OP EVALUATION    Patient Name: Jhonny Rebolledo    Pre-op Diagnosis: History of colon polyps [Z86.010]  Colitis [K52.9]    Procedure(s):  colonoscopy    Surgeon(s) and Role:     * Alberto Stokes, DO - Primary    Pre-op vitals reviewed.   Pulse: 64  Resp: 2018    Right eye-Dr. Romie Duffy   • CATARACT     • CHOLECYSTECTOMY  2003   • COLONOSCOPY  2003   • COLONOSCOPY N/A 1/30/2015    Procedure: COLONOSCOPY;  Surgeon: Jack Gupta MD;  Location: [facility] ENDOSCOPY   • COLONOSCOPY N/A 1/17/2020    Procedure: COLO

## 2022-01-19 DIAGNOSIS — E11.51 DM (DIABETES MELLITUS) TYPE II, CONTROLLED, WITH PERIPHERAL VASCULAR DISORDER (HCC): ICD-10-CM

## 2022-01-19 DIAGNOSIS — I10 ESSENTIAL HYPERTENSION: ICD-10-CM

## 2022-01-19 RX ORDER — LISINOPRIL 5 MG/1
5 TABLET ORAL DAILY
Qty: 90 TABLET | Refills: 0 | Status: SHIPPED | OUTPATIENT
Start: 2022-01-19

## 2022-01-19 NOTE — TELEPHONE ENCOUNTER
Refill Passed Protocol:     Pt requesting refill of   Requested Prescriptions     Pending Prescriptions Disp Refills   • LISINOPRIL 5 MG Oral Tab [Pharmacy Med Name: LISINOPRIL 5 MG Tablet] 90 tablet 0     Sig: TAKE 1 TABLET (5 MG TOTAL) BY MOUTH DAILY.

## 2022-02-21 RX ORDER — AMLODIPINE BESYLATE 5 MG/1
TABLET ORAL
Qty: 90 TABLET | Refills: 0 | Status: SHIPPED | OUTPATIENT
Start: 2022-02-21 | End: 2022-03-08

## 2022-02-22 ENCOUNTER — LAB ENCOUNTER (OUTPATIENT)
Dept: LAB | Age: 72
End: 2022-02-22
Attending: FAMILY MEDICINE
Payer: MEDICARE

## 2022-02-22 DIAGNOSIS — E11.51 TYPE II DIABETES MELLITUS WITH PERIPHERAL CIRCULATORY DISORDER (HCC): Primary | ICD-10-CM

## 2022-02-22 LAB
ALBUMIN SERPL-MCNC: 3.5 G/DL (ref 3.4–5)
ALBUMIN/GLOB SERPL: 1.1 {RATIO} (ref 1–2)
ALP LIVER SERPL-CCNC: 62 U/L
ALT SERPL-CCNC: 33 U/L
ANION GAP SERPL CALC-SCNC: 5 MMOL/L (ref 0–18)
AST SERPL-CCNC: 21 U/L (ref 15–37)
BILIRUB SERPL-MCNC: 0.6 MG/DL (ref 0.1–2)
BUN BLD-MCNC: 17 MG/DL (ref 7–18)
CALCIUM BLD-MCNC: 9.3 MG/DL (ref 8.5–10.1)
CHLORIDE SERPL-SCNC: 108 MMOL/L (ref 98–112)
CHOLEST SERPL-MCNC: 126 MG/DL (ref ?–200)
CO2 SERPL-SCNC: 27 MMOL/L (ref 21–32)
CREAT BLD-MCNC: 0.99 MG/DL
EST. AVERAGE GLUCOSE BLD GHB EST-MCNC: 128 MG/DL (ref 68–126)
FASTING PATIENT LIPID ANSWER: YES
FASTING STATUS PATIENT QL REPORTED: YES
GLOBULIN PLAS-MCNC: 3.1 G/DL (ref 2.8–4.4)
GLUCOSE BLD-MCNC: 124 MG/DL (ref 70–99)
HBA1C MFR BLD: 6.1 % (ref ?–5.7)
HDLC SERPL-MCNC: 41 MG/DL (ref 40–59)
LDLC SERPL CALC-MCNC: 59 MG/DL (ref ?–100)
NONHDLC SERPL-MCNC: 85 MG/DL (ref ?–130)
OSMOLALITY SERPL CALC.SUM OF ELEC: 293 MOSM/KG (ref 275–295)
POTASSIUM SERPL-SCNC: 4.5 MMOL/L (ref 3.5–5.1)
PROT SERPL-MCNC: 6.6 G/DL (ref 6.4–8.2)
TRIGL SERPL-MCNC: 152 MG/DL (ref 30–149)
VLDLC SERPL CALC-MCNC: 22 MG/DL (ref 0–30)

## 2022-02-22 PROCEDURE — 80061 LIPID PANEL: CPT

## 2022-02-22 PROCEDURE — 80053 COMPREHEN METABOLIC PANEL: CPT | Performed by: FAMILY MEDICINE

## 2022-02-22 PROCEDURE — 3044F HG A1C LEVEL LT 7.0%: CPT | Performed by: FAMILY MEDICINE

## 2022-02-22 PROCEDURE — 36415 COLL VENOUS BLD VENIPUNCTURE: CPT | Performed by: FAMILY MEDICINE

## 2022-02-22 PROCEDURE — 83036 HEMOGLOBIN GLYCOSYLATED A1C: CPT | Performed by: FAMILY MEDICINE

## 2022-03-08 ENCOUNTER — OFFICE VISIT (OUTPATIENT)
Dept: FAMILY MEDICINE CLINIC | Facility: CLINIC | Age: 72
End: 2022-03-08
Payer: MEDICARE

## 2022-03-08 VITALS
TEMPERATURE: 97 F | HEART RATE: 73 BPM | OXYGEN SATURATION: 95 % | HEIGHT: 73.23 IN | BODY MASS INDEX: 35.74 KG/M2 | WEIGHT: 272.63 LBS | SYSTOLIC BLOOD PRESSURE: 126 MMHG | DIASTOLIC BLOOD PRESSURE: 70 MMHG | RESPIRATION RATE: 16 BRPM

## 2022-03-08 DIAGNOSIS — E11.51 DM (DIABETES MELLITUS) TYPE II, CONTROLLED, WITH PERIPHERAL VASCULAR DISORDER (HCC): ICD-10-CM

## 2022-03-08 DIAGNOSIS — Z95.1 S/P CABG (CORONARY ARTERY BYPASS GRAFT): ICD-10-CM

## 2022-03-08 DIAGNOSIS — K22.70 BARRETT'S ESOPHAGUS DETERMINED BY ENDOSCOPY: ICD-10-CM

## 2022-03-08 DIAGNOSIS — I77.9 BILATERAL CAROTID ARTERY DISEASE, UNSPECIFIED TYPE (HCC): ICD-10-CM

## 2022-03-08 DIAGNOSIS — I73.9 PAD (PERIPHERAL ARTERY DISEASE) (HCC): ICD-10-CM

## 2022-03-08 DIAGNOSIS — G47.33 OSA ON CPAP: ICD-10-CM

## 2022-03-08 DIAGNOSIS — I25.118 CORONARY ARTERY DISEASE OF NATIVE ARTERY OF NATIVE HEART WITH STABLE ANGINA PECTORIS (HCC): ICD-10-CM

## 2022-03-08 DIAGNOSIS — M50.20 CERVICAL DISC HERNIATION: ICD-10-CM

## 2022-03-08 DIAGNOSIS — N40.0 BENIGN PROSTATIC HYPERPLASIA WITHOUT LOWER URINARY TRACT SYMPTOMS: ICD-10-CM

## 2022-03-08 DIAGNOSIS — E78.2 HYPERLIPIDEMIA, MIXED: ICD-10-CM

## 2022-03-08 DIAGNOSIS — H90.3 SENSORINEURAL HEARING LOSS (SNHL) OF BOTH EARS: ICD-10-CM

## 2022-03-08 DIAGNOSIS — Z23 NEED FOR VACCINATION: ICD-10-CM

## 2022-03-08 DIAGNOSIS — M70.61 TROCHANTERIC BURSITIS OF RIGHT HIP: ICD-10-CM

## 2022-03-08 DIAGNOSIS — I70.203 BILATERAL ATHEROSCLEROSIS OF LEGS (HCC): ICD-10-CM

## 2022-03-08 DIAGNOSIS — M51.36 LUMBAR DEGENERATIVE DISC DISEASE: ICD-10-CM

## 2022-03-08 DIAGNOSIS — M47.22 OSTEOARTHRITIS OF SPINE WITH RADICULOPATHY, CERVICAL REGION: ICD-10-CM

## 2022-03-08 DIAGNOSIS — N52.9 VASCULOGENIC ERECTILE DYSFUNCTION, UNSPECIFIED VASCULOGENIC ERECTILE DYSFUNCTION TYPE: ICD-10-CM

## 2022-03-08 DIAGNOSIS — Z99.89 OSA ON CPAP: ICD-10-CM

## 2022-03-08 DIAGNOSIS — M47.816 ARTHRITIS OF LUMBAR SPINE: ICD-10-CM

## 2022-03-08 DIAGNOSIS — M47.812 FACET ARTHRITIS OF CERVICAL REGION: ICD-10-CM

## 2022-03-08 DIAGNOSIS — M48.02 FORAMINAL STENOSIS OF CERVICAL REGION: ICD-10-CM

## 2022-03-08 DIAGNOSIS — I70.202 ATHEROSCLEROSIS OF ARTERY OF LEFT LOWER EXTREMITY (HCC): ICD-10-CM

## 2022-03-08 DIAGNOSIS — I71.4 ABDOMINAL AORTIC ANEURYSM (AAA) WITHOUT RUPTURE (HCC): ICD-10-CM

## 2022-03-08 DIAGNOSIS — K76.0 FATTY LIVER: ICD-10-CM

## 2022-03-08 DIAGNOSIS — I74.09 AORTOILIAC OCCLUSIVE DISEASE (HCC): ICD-10-CM

## 2022-03-08 DIAGNOSIS — Z95.828 STATUS POST INSERTION OF ILIAC ARTERY STENT: ICD-10-CM

## 2022-03-08 DIAGNOSIS — D12.6 TUBULAR ADENOMA OF COLON: ICD-10-CM

## 2022-03-08 DIAGNOSIS — R63.5 WEIGHT GAIN: ICD-10-CM

## 2022-03-08 DIAGNOSIS — I10 ESSENTIAL HYPERTENSION: ICD-10-CM

## 2022-03-08 DIAGNOSIS — Z00.00 ENCOUNTER FOR ANNUAL HEALTH EXAMINATION: Primary | ICD-10-CM

## 2022-03-08 PROBLEM — D75.1 POLYCYTHEMIA: Status: RESOLVED | Noted: 2017-02-09 | Resolved: 2022-03-08

## 2022-03-08 PROBLEM — M25.551 RIGHT HIP PAIN: Status: RESOLVED | Noted: 2021-06-07 | Resolved: 2022-03-08

## 2022-03-08 PROBLEM — E66.9 CLASS 1 OBESITY WITH SERIOUS COMORBIDITY AND BODY MASS INDEX (BMI) OF 34.0 TO 34.9 IN ADULT: Status: RESOLVED | Noted: 2020-02-03 | Resolved: 2022-03-08

## 2022-03-08 PROBLEM — E66.811 CLASS 1 OBESITY WITH SERIOUS COMORBIDITY AND BODY MASS INDEX (BMI) OF 34.0 TO 34.9 IN ADULT: Status: RESOLVED | Noted: 2020-02-03 | Resolved: 2022-03-08

## 2022-03-08 PROCEDURE — 3074F SYST BP LT 130 MM HG: CPT | Performed by: FAMILY MEDICINE

## 2022-03-08 PROCEDURE — G0439 PPPS, SUBSEQ VISIT: HCPCS | Performed by: FAMILY MEDICINE

## 2022-03-08 PROCEDURE — 99397 PER PM REEVAL EST PAT 65+ YR: CPT | Performed by: FAMILY MEDICINE

## 2022-03-08 PROCEDURE — 3078F DIAST BP <80 MM HG: CPT | Performed by: FAMILY MEDICINE

## 2022-03-08 PROCEDURE — 96160 PT-FOCUSED HLTH RISK ASSMT: CPT | Performed by: FAMILY MEDICINE

## 2022-03-08 PROCEDURE — 3008F BODY MASS INDEX DOCD: CPT | Performed by: FAMILY MEDICINE

## 2022-03-08 RX ORDER — METFORMIN HYDROCHLORIDE 500 MG/1
500 TABLET, EXTENDED RELEASE ORAL
Qty: 90 TABLET | Refills: 1 | Status: SHIPPED | OUTPATIENT
Start: 2022-03-08

## 2022-03-08 RX ORDER — ROSUVASTATIN CALCIUM 40 MG/1
40 TABLET, COATED ORAL NIGHTLY
Qty: 90 TABLET | Refills: 1 | Status: SHIPPED | OUTPATIENT
Start: 2022-03-08

## 2022-03-08 RX ORDER — EZETIMIBE 10 MG/1
10 TABLET ORAL NIGHTLY
Qty: 90 TABLET | Refills: 1 | Status: SHIPPED | OUTPATIENT
Start: 2022-03-08

## 2022-03-08 RX ORDER — AMLODIPINE BESYLATE 5 MG/1
5 TABLET ORAL DAILY
Qty: 90 TABLET | Refills: 1 | Status: SHIPPED | OUTPATIENT
Start: 2022-03-08

## 2022-03-08 RX ORDER — METOPROLOL SUCCINATE 50 MG/1
50 TABLET, EXTENDED RELEASE ORAL DAILY
Qty: 90 TABLET | Refills: 1 | Status: SHIPPED | OUTPATIENT
Start: 2022-03-08

## 2022-03-08 RX ORDER — LISINOPRIL 5 MG/1
5 TABLET ORAL DAILY
Qty: 90 TABLET | Refills: 1 | Status: SHIPPED | OUTPATIENT
Start: 2022-03-08

## 2022-03-08 RX ORDER — CHOLECALCIFEROL (VITAMIN D3) 125 MCG
CAPSULE ORAL
Qty: 365 TABLET | Refills: 0 | Status: SHIPPED | OUTPATIENT
Start: 2022-03-08

## 2022-03-14 ENCOUNTER — TELEPHONE (OUTPATIENT)
Dept: FAMILY MEDICINE CLINIC | Facility: CLINIC | Age: 72
End: 2022-03-14

## 2022-03-14 NOTE — TELEPHONE ENCOUNTER
Incoming fax from pharmacy to clarify Ozempic order titration   Is pt titrating higher after 0.5mg weekly x 1 month or is 0.5mg weekly his end dose?

## 2022-03-14 NOTE — TELEPHONE ENCOUNTER
Spoke to Kevon Frederick from [facility] to clarify order for [employer] as indicated per . Also transferred to [facility] Pharmacist to verify.  Medication Rx adjusted

## 2022-03-14 NOTE — TELEPHONE ENCOUNTER
Have pt take Ozempic: start 0.25 mg subcutaneous weekly x 1 month, then increase to 0.5 mg subcutaneous weekly thereafter. He will f-u with [facility] in 3 months. Thanks.

## 2022-06-14 ENCOUNTER — OFFICE VISIT (OUTPATIENT)
Dept: FAMILY MEDICINE CLINIC | Facility: CLINIC | Age: 72
End: 2022-06-14
Payer: MEDICARE

## 2022-06-14 VITALS
TEMPERATURE: 98 F | HEART RATE: 68 BPM | SYSTOLIC BLOOD PRESSURE: 131 MMHG | RESPIRATION RATE: 16 BRPM | BODY MASS INDEX: 34.62 KG/M2 | HEIGHT: 72 IN | WEIGHT: 255.63 LBS | DIASTOLIC BLOOD PRESSURE: 70 MMHG | OXYGEN SATURATION: 98 %

## 2022-06-14 DIAGNOSIS — E11.51 DM (DIABETES MELLITUS) TYPE II, CONTROLLED, WITH PERIPHERAL VASCULAR DISORDER (HCC): Primary | ICD-10-CM

## 2022-06-14 DIAGNOSIS — Z23 NEED FOR VACCINATION: ICD-10-CM

## 2022-06-14 DIAGNOSIS — I25.118 CORONARY ARTERY DISEASE OF NATIVE ARTERY OF NATIVE HEART WITH STABLE ANGINA PECTORIS (HCC): ICD-10-CM

## 2022-06-14 DIAGNOSIS — M25.562 ACUTE PAIN OF LEFT KNEE: ICD-10-CM

## 2022-06-14 LAB
CREAT UR-SCNC: 109 MG/DL
MICROALBUMIN UR-MCNC: 0.52 MG/DL
MICROALBUMIN/CREAT 24H UR-RTO: 4.8 UG/MG (ref ?–30)

## 2022-06-14 PROCEDURE — 3078F DIAST BP <80 MM HG: CPT | Performed by: FAMILY MEDICINE

## 2022-06-14 PROCEDURE — 82043 UR ALBUMIN QUANTITATIVE: CPT | Performed by: FAMILY MEDICINE

## 2022-06-14 PROCEDURE — 82570 ASSAY OF URINE CREATININE: CPT | Performed by: FAMILY MEDICINE

## 2022-06-14 PROCEDURE — 3008F BODY MASS INDEX DOCD: CPT | Performed by: FAMILY MEDICINE

## 2022-06-14 PROCEDURE — 3061F NEG MICROALBUMINURIA REV: CPT | Performed by: FAMILY MEDICINE

## 2022-06-14 PROCEDURE — 3075F SYST BP GE 130 - 139MM HG: CPT | Performed by: FAMILY MEDICINE

## 2022-06-14 PROCEDURE — 1125F AMNT PAIN NOTED PAIN PRSNT: CPT | Performed by: FAMILY MEDICINE

## 2022-06-14 PROCEDURE — 99214 OFFICE O/P EST MOD 30 MIN: CPT | Performed by: FAMILY MEDICINE

## 2022-06-14 RX ORDER — ZOSTER VACCINE RECOMBINANT, ADJUVANTED 50 MCG/0.5
50 KIT INTRAMUSCULAR ONCE
Qty: 1 EACH | Refills: 1 | Status: SHIPPED | OUTPATIENT
Start: 2022-06-14 | End: 2022-06-14

## 2022-06-14 RX ORDER — SEMAGLUTIDE 1.34 MG/ML
1 INJECTION, SOLUTION SUBCUTANEOUS WEEKLY
Qty: 9 ML | Refills: 0 | Status: SHIPPED | OUTPATIENT
Start: 2022-06-14

## 2022-06-29 ENCOUNTER — TELEPHONE (OUTPATIENT)
Dept: FAMILY MEDICINE CLINIC | Facility: CLINIC | Age: 72
End: 2022-06-29

## 2022-07-13 NOTE — TELEPHONE ENCOUNTER
Medication assistance forms initially placed for  for review and signature, due to pt needing forms for deadline please review and sign if ok.     Placed in provider bin for review

## 2022-08-10 ENCOUNTER — TELEPHONE (OUTPATIENT)
Dept: FAMILY MEDICINE CLINIC | Facility: CLINIC | Age: 72
End: 2022-08-10

## 2022-08-10 NOTE — TELEPHONE ENCOUNTER
Informed pt medication has not been received but we did received paper notification that pt medicine would be shipped to PCP office. Will notify pt once medication is received.  PT ALYSON

## 2022-08-22 ENCOUNTER — TELEPHONE (OUTPATIENT)
Dept: FAMILY MEDICINE CLINIC | Facility: CLINIC | Age: 72
End: 2022-08-22

## 2022-08-23 NOTE — TELEPHONE ENCOUNTER
Letter received from [facility] Patient Assistance Program dated 7/29/22 states a 4 month supply of Ozempic was going to be shipped to [de-identified] office  Letter sent to scanning  Ozempic received in clinic is for this pt    Spoke to pt and let him know Christine Lake in office is here and he can come   Patient voiced understanding and states he will come to office tomorrow 8/23  to

## 2022-08-30 ENCOUNTER — HOSPITAL ENCOUNTER (OUTPATIENT)
Dept: GENERAL RADIOLOGY | Facility: HOSPITAL | Age: 72
Discharge: HOME | End: 2022-08-30
Attending: UROLOGY
Payer: MEDICARE

## 2022-08-30 ENCOUNTER — LAB ENCOUNTER (OUTPATIENT)
Dept: LAB | Facility: HOSPITAL | Age: 72
End: 2022-08-30
Attending: FAMILY MEDICINE
Payer: MEDICARE

## 2022-08-30 DIAGNOSIS — D75.1 POLYCYTHEMIA, SECONDARY: ICD-10-CM

## 2022-08-30 DIAGNOSIS — N20.0 RENAL CALCULUS, BILATERAL: ICD-10-CM

## 2022-08-30 DIAGNOSIS — I10 ESSENTIAL HYPERTENSION, MALIGNANT: ICD-10-CM

## 2022-08-30 LAB
ALBUMIN SERPL-MCNC: 3.7 G/DL (ref 3.4–5)
ALBUMIN/GLOB SERPL: 1.1 {RATIO} (ref 1–2)
ALP LIVER SERPL-CCNC: 64 U/L
ALT SERPL-CCNC: 27 U/L
ANION GAP SERPL CALC-SCNC: 2 MMOL/L (ref 0–18)
AST SERPL-CCNC: 18 U/L (ref 15–37)
BASOPHILS # BLD AUTO: 0.03 X10(3) UL (ref 0–0.2)
BASOPHILS NFR BLD AUTO: 0.4 %
BILIRUB SERPL-MCNC: 0.6 MG/DL (ref 0.1–2)
BUN BLD-MCNC: 16 MG/DL (ref 7–18)
CALCIUM BLD-MCNC: 9.2 MG/DL (ref 8.5–10.1)
CHLORIDE SERPL-SCNC: 111 MMOL/L (ref 98–112)
CHOLEST SERPL-MCNC: 142 MG/DL (ref ?–200)
CO2 SERPL-SCNC: 27 MMOL/L (ref 21–32)
CREAT BLD-MCNC: 1.01 MG/DL
EOSINOPHIL # BLD AUTO: 0.14 X10(3) UL (ref 0–0.7)
EOSINOPHIL NFR BLD AUTO: 1.9 %
ERYTHROCYTE [DISTWIDTH] IN BLOOD BY AUTOMATED COUNT: 12.8 %
EST. AVERAGE GLUCOSE BLD GHB EST-MCNC: 120 MG/DL (ref 68–126)
FASTING PATIENT LIPID ANSWER: YES
FASTING STATUS PATIENT QL REPORTED: YES
GFR SERPLBLD BASED ON 1.73 SQ M-ARVRAT: 79 ML/MIN/1.73M2 (ref 60–?)
GLOBULIN PLAS-MCNC: 3.4 G/DL (ref 2.8–4.4)
GLUCOSE BLD-MCNC: 114 MG/DL (ref 70–99)
HBA1C MFR BLD: 5.8 % (ref ?–5.7)
HCT VFR BLD AUTO: 47.4 %
HDLC SERPL-MCNC: 50 MG/DL (ref 40–59)
HGB BLD-MCNC: 15.5 G/DL
IMM GRANULOCYTES # BLD AUTO: 0.02 X10(3) UL (ref 0–1)
IMM GRANULOCYTES NFR BLD: 0.3 %
LDLC SERPL CALC-MCNC: 72 MG/DL (ref ?–100)
LYMPHOCYTES # BLD AUTO: 2.45 X10(3) UL (ref 1–4)
LYMPHOCYTES NFR BLD AUTO: 33.9 %
MCH RBC QN AUTO: 30.5 PG (ref 26–34)
MCHC RBC AUTO-ENTMCNC: 32.7 G/DL (ref 31–37)
MCV RBC AUTO: 93.1 FL
MONOCYTES # BLD AUTO: 0.65 X10(3) UL (ref 0.1–1)
MONOCYTES NFR BLD AUTO: 9 %
NEUTROPHILS # BLD AUTO: 3.94 X10 (3) UL (ref 1.5–7.7)
NEUTROPHILS # BLD AUTO: 3.94 X10(3) UL (ref 1.5–7.7)
NEUTROPHILS NFR BLD AUTO: 54.5 %
NONHDLC SERPL-MCNC: 92 MG/DL (ref ?–130)
OSMOLALITY SERPL CALC.SUM OF ELEC: 292 MOSM/KG (ref 275–295)
PLATELET # BLD AUTO: 204 10(3)UL (ref 150–450)
POTASSIUM SERPL-SCNC: 4.7 MMOL/L (ref 3.5–5.1)
PROT SERPL-MCNC: 7.1 G/DL (ref 6.4–8.2)
RBC # BLD AUTO: 5.09 X10(6)UL
SODIUM SERPL-SCNC: 140 MMOL/L (ref 136–145)
TRIGL SERPL-MCNC: 108 MG/DL (ref 30–149)
TSI SER-ACNC: 2.06 MIU/ML (ref 0.36–3.74)
VLDLC SERPL CALC-MCNC: 17 MG/DL (ref 0–30)
WBC # BLD AUTO: 7.2 X10(3) UL (ref 4–11)

## 2022-08-30 PROCEDURE — 83036 HEMOGLOBIN GLYCOSYLATED A1C: CPT

## 2022-08-30 PROCEDURE — 74018 RADEX ABDOMEN 1 VIEW: CPT | Performed by: UROLOGY

## 2022-08-30 PROCEDURE — 85025 COMPLETE CBC W/AUTO DIFF WBC: CPT

## 2022-08-30 PROCEDURE — 80053 COMPREHEN METABOLIC PANEL: CPT

## 2022-08-30 PROCEDURE — 80061 LIPID PANEL: CPT

## 2022-08-30 PROCEDURE — 3044F HG A1C LEVEL LT 7.0%: CPT | Performed by: FAMILY MEDICINE

## 2022-08-30 PROCEDURE — 36415 COLL VENOUS BLD VENIPUNCTURE: CPT | Performed by: FAMILY MEDICINE

## 2022-08-30 PROCEDURE — 84443 ASSAY THYROID STIM HORMONE: CPT | Performed by: FAMILY MEDICINE

## 2022-08-31 DIAGNOSIS — I25.118 CORONARY ARTERY DISEASE OF NATIVE ARTERY OF NATIVE HEART WITH STABLE ANGINA PECTORIS (HCC): ICD-10-CM

## 2022-08-31 DIAGNOSIS — I10 ESSENTIAL HYPERTENSION: ICD-10-CM

## 2022-08-31 DIAGNOSIS — E11.51 DM (DIABETES MELLITUS) TYPE II, CONTROLLED, WITH PERIPHERAL VASCULAR DISORDER (HCC): ICD-10-CM

## 2022-08-31 RX ORDER — METOPROLOL SUCCINATE 50 MG/1
TABLET, EXTENDED RELEASE ORAL
Qty: 90 TABLET | Refills: 1 | Status: SHIPPED | OUTPATIENT
Start: 2022-08-31

## 2022-08-31 RX ORDER — METFORMIN HYDROCHLORIDE 500 MG/1
TABLET, EXTENDED RELEASE ORAL
Qty: 90 TABLET | Refills: 1 | Status: SHIPPED | OUTPATIENT
Start: 2022-08-31

## 2022-09-08 ENCOUNTER — OFFICE VISIT (OUTPATIENT)
Dept: FAMILY MEDICINE CLINIC | Facility: CLINIC | Age: 72
End: 2022-09-08
Payer: MEDICARE

## 2022-09-08 VITALS
DIASTOLIC BLOOD PRESSURE: 68 MMHG | RESPIRATION RATE: 16 BRPM | HEART RATE: 77 BPM | WEIGHT: 253.19 LBS | SYSTOLIC BLOOD PRESSURE: 132 MMHG | BODY MASS INDEX: 34 KG/M2 | TEMPERATURE: 98 F | OXYGEN SATURATION: 96 %

## 2022-09-08 DIAGNOSIS — I73.9 PAD (PERIPHERAL ARTERY DISEASE) (HCC): ICD-10-CM

## 2022-09-08 DIAGNOSIS — I70.203 BILATERAL ATHEROSCLEROSIS OF LEGS (HCC): ICD-10-CM

## 2022-09-08 DIAGNOSIS — Z95.1 S/P CABG (CORONARY ARTERY BYPASS GRAFT): ICD-10-CM

## 2022-09-08 DIAGNOSIS — I10 ESSENTIAL HYPERTENSION: ICD-10-CM

## 2022-09-08 DIAGNOSIS — K76.0 FATTY LIVER: ICD-10-CM

## 2022-09-08 DIAGNOSIS — E78.2 HYPERLIPIDEMIA, MIXED: ICD-10-CM

## 2022-09-08 DIAGNOSIS — E11.51 DM (DIABETES MELLITUS) TYPE II, CONTROLLED, WITH PERIPHERAL VASCULAR DISORDER (HCC): Primary | ICD-10-CM

## 2022-09-08 DIAGNOSIS — I25.118 CORONARY ARTERY DISEASE OF NATIVE ARTERY OF NATIVE HEART WITH STABLE ANGINA PECTORIS (HCC): ICD-10-CM

## 2022-09-08 DIAGNOSIS — K22.70 BARRETT'S ESOPHAGUS DETERMINED BY ENDOSCOPY: ICD-10-CM

## 2022-09-08 DIAGNOSIS — I77.9 BILATERAL CAROTID ARTERY DISEASE, UNSPECIFIED TYPE (HCC): ICD-10-CM

## 2022-09-08 PROBLEM — G89.29 CHRONIC PAIN OF LEFT KNEE: Status: ACTIVE | Noted: 2022-09-08

## 2022-09-08 PROBLEM — M25.561 CHRONIC PAIN OF RIGHT KNEE: Status: ACTIVE | Noted: 2022-09-08

## 2022-09-08 PROBLEM — M25.562 CHRONIC PAIN OF LEFT KNEE: Status: ACTIVE | Noted: 2022-09-08

## 2022-09-08 PROBLEM — G89.29 CHRONIC PAIN OF RIGHT KNEE: Status: ACTIVE | Noted: 2022-09-08

## 2022-09-08 PROCEDURE — 3075F SYST BP GE 130 - 139MM HG: CPT | Performed by: FAMILY MEDICINE

## 2022-09-08 PROCEDURE — 99214 OFFICE O/P EST MOD 30 MIN: CPT | Performed by: FAMILY MEDICINE

## 2022-09-08 PROCEDURE — 3078F DIAST BP <80 MM HG: CPT | Performed by: FAMILY MEDICINE

## 2022-09-08 RX ORDER — LISINOPRIL 5 MG/1
5 TABLET ORAL DAILY
Qty: 90 TABLET | Refills: 1 | Status: SHIPPED | OUTPATIENT
Start: 2022-09-08

## 2022-09-08 RX ORDER — EZETIMIBE 10 MG/1
10 TABLET ORAL NIGHTLY
Qty: 90 TABLET | Refills: 1 | Status: SHIPPED | OUTPATIENT
Start: 2022-09-08

## 2022-09-08 RX ORDER — SEMAGLUTIDE 1.34 MG/ML
1 INJECTION, SOLUTION SUBCUTANEOUS WEEKLY
Qty: 9 ML | Refills: 0 | Status: SHIPPED | OUTPATIENT
Start: 2022-09-08

## 2022-09-08 RX ORDER — ROSUVASTATIN CALCIUM 40 MG/1
40 TABLET, COATED ORAL NIGHTLY
Qty: 90 TABLET | Refills: 1 | Status: SHIPPED | OUTPATIENT
Start: 2022-09-08

## 2022-09-08 RX ORDER — AMLODIPINE BESYLATE 5 MG/1
5 TABLET ORAL DAILY
Qty: 90 TABLET | Refills: 1 | Status: SHIPPED | OUTPATIENT
Start: 2022-09-08

## 2022-09-18 ENCOUNTER — APPOINTMENT (OUTPATIENT)
Dept: GENERAL RADIOLOGY | Age: 72
End: 2022-09-18
Attending: EMERGENCY MEDICINE

## 2022-09-18 ENCOUNTER — HOSPITAL ENCOUNTER (EMERGENCY)
Age: 72
Discharge: HOME | End: 2022-09-18
Attending: EMERGENCY MEDICINE

## 2022-09-18 VITALS
SYSTOLIC BLOOD PRESSURE: 135 MMHG | BODY MASS INDEX: 33.13 KG/M2 | WEIGHT: 250 LBS | HEIGHT: 73 IN | HEART RATE: 81 BPM | RESPIRATION RATE: 18 BRPM | OXYGEN SATURATION: 96 % | DIASTOLIC BLOOD PRESSURE: 56 MMHG | TEMPERATURE: 99 F

## 2022-09-18 DIAGNOSIS — M72.2 PLANTAR FASCIITIS, LEFT: Primary | ICD-10-CM

## 2022-09-18 PROCEDURE — 99283 EMERGENCY DEPT VISIT LOW MDM: CPT

## 2022-09-18 PROCEDURE — 73630 X-RAY EXAM OF FOOT: CPT | Performed by: EMERGENCY MEDICINE

## 2022-09-18 NOTE — ED INITIAL ASSESSMENT (HPI)
Pt c/o left poster foot pain x few days, denies trauma. Denies fevers/chills. Pt ambulatory to room.

## 2022-09-19 ENCOUNTER — TELEPHONE (OUTPATIENT)
Dept: PODIATRY CLINIC | Facility: CLINIC | Age: 72
End: 2022-09-19

## 2022-09-19 ENCOUNTER — MED REC SCAN ONLY (OUTPATIENT)
Dept: FAMILY MEDICINE CLINIC | Facility: CLINIC | Age: 72
End: 2022-09-19

## 2022-09-19 NOTE — ED PROVIDER NOTES
The patient was seen and examined. Note reviewed and agreed. I provided a substantive portion of the care of this patient. I personally performed the Medical Decision Making for this encounter. Patient has increased his physical mobility due to a trip that is going to require walking. He was evaluated. Symptoms and clinical findings consistent with Planter fasciitis. NSAIDs, symptomatic treatment were recommended. Patient already has an orthotic.

## 2022-10-31 ENCOUNTER — TELEPHONE (OUTPATIENT)
Dept: FAMILY MEDICINE CLINIC | Facility: CLINIC | Age: 72
End: 2022-10-31

## 2022-10-31 DIAGNOSIS — M72.2 PLANTAR FASCIITIS, LEFT: Primary | ICD-10-CM

## 2022-11-01 NOTE — TELEPHONE ENCOUNTER
Patient has appt w/ Dr. Favian Singer on   Future Appointments   Date Time Provider Liane Lopez   11/2/2022  3:30 PM Sil Jeong [id number]   12/9/2022  8:30 AM Gena Ahmadi DO [facility] [place]

## 2022-11-01 NOTE — TELEPHONE ENCOUNTER
Pt called stating he needs referral has his appt tomorrow. Did ask pt that in the future to give us a few day notice.

## 2022-11-01 NOTE — TELEPHONE ENCOUNTER
S/w Maria Dolores [facility]    Informed patient referral signed. Patient v/u Dr. Hua Rodriguez will see patient only when referral approved. Awaiting approval to r/s. Informed patient referral good for 1 year.  Patient v/u

## 2022-11-07 ENCOUNTER — TELEPHONE (OUTPATIENT)
Dept: PODIATRY CLINIC | Facility: CLINIC | Age: 72
End: 2022-11-07

## 2022-11-15 ENCOUNTER — TELEPHONE (OUTPATIENT)
Dept: FAMILY MEDICINE CLINIC | Facility: CLINIC | Age: 72
End: 2022-11-15

## 2022-11-15 DIAGNOSIS — M72.2 PLANTAR FASCIITIS: Primary | ICD-10-CM

## 2022-11-15 NOTE — TELEPHONE ENCOUNTER
Referral pended. Patient was originally referred to Dr. Peggy Arenas.  Would like alternate provider

## 2022-11-15 NOTE — TELEPHONE ENCOUNTER
Pt called office stating that he needs a new referral to a podiatrist since the one he was referred to has cancelled on him 3 time.

## 2022-11-15 NOTE — TELEPHONE ENCOUNTER
Left detailed message with referral information. Advised patient to contact our office with questions. Provided pt office phone [phone] along with office hours.

## 2022-11-16 NOTE — TELEPHONE ENCOUNTER
Spoke to pt informed of referral information.  Pt VU understanding denies further questions at this time

## 2022-11-22 ENCOUNTER — TELEPHONE (OUTPATIENT)
Dept: FAMILY MEDICINE CLINIC | Facility: CLINIC | Age: 72
End: 2022-11-22

## 2022-11-22 DIAGNOSIS — D12.6 TUBULAR ADENOMA OF COLON: Primary | ICD-10-CM

## 2022-11-22 NOTE — TELEPHONE ENCOUNTER
Referral signed for Dr. Rebecca Perez. Please update his flu shot if he has completed it. I queried the state but is not available. If he has not gotten it tell him to get it ASAP influenza a and B are on the rise.   He is up-to-date on his COVID-vaccine    Please inform

## 2022-12-01 ENCOUNTER — TELEPHONE (OUTPATIENT)
Dept: FAMILY MEDICINE CLINIC | Facility: CLINIC | Age: 72
End: 2022-12-01

## 2022-12-01 NOTE — TELEPHONE ENCOUNTER
[facility] [employer] Patient assistance program refill request order has been signed and review faxed.     Fax # [phone]

## 2022-12-07 PROCEDURE — 3044F HG A1C LEVEL LT 7.0%: CPT | Performed by: FAMILY MEDICINE

## 2022-12-09 ENCOUNTER — OFFICE VISIT (OUTPATIENT)
Dept: FAMILY MEDICINE CLINIC | Facility: CLINIC | Age: 72
End: 2022-12-09
Payer: MEDICARE

## 2022-12-09 VITALS
RESPIRATION RATE: 18 BRPM | TEMPERATURE: 98 F | WEIGHT: 250.63 LBS | OXYGEN SATURATION: 94 % | SYSTOLIC BLOOD PRESSURE: 122 MMHG | HEART RATE: 79 BPM | BODY MASS INDEX: 33 KG/M2 | DIASTOLIC BLOOD PRESSURE: 60 MMHG

## 2022-12-09 DIAGNOSIS — E11.51 DM (DIABETES MELLITUS) TYPE II, CONTROLLED, WITH PERIPHERAL VASCULAR DISORDER (HCC): Primary | ICD-10-CM

## 2022-12-09 DIAGNOSIS — Z99.89 OSA ON CPAP: ICD-10-CM

## 2022-12-09 DIAGNOSIS — K76.0 FATTY LIVER: ICD-10-CM

## 2022-12-09 DIAGNOSIS — Z23 NEED FOR VACCINATION: ICD-10-CM

## 2022-12-09 DIAGNOSIS — I25.118 CORONARY ARTERY DISEASE OF NATIVE ARTERY OF NATIVE HEART WITH STABLE ANGINA PECTORIS (HCC): ICD-10-CM

## 2022-12-09 DIAGNOSIS — I10 ESSENTIAL HYPERTENSION: ICD-10-CM

## 2022-12-09 DIAGNOSIS — M72.2 PLANTAR FASCIITIS: ICD-10-CM

## 2022-12-09 DIAGNOSIS — E78.2 HYPERLIPIDEMIA, MIXED: ICD-10-CM

## 2022-12-09 DIAGNOSIS — I73.9 PAD (PERIPHERAL ARTERY DISEASE) (HCC): ICD-10-CM

## 2022-12-09 DIAGNOSIS — K22.70 BARRETT'S ESOPHAGUS DETERMINED BY ENDOSCOPY: ICD-10-CM

## 2022-12-09 DIAGNOSIS — G47.33 OSA ON CPAP: ICD-10-CM

## 2022-12-09 LAB
ALBUMIN/GLOBULIN RATIO: 1.7
ALBUMIN: 4.2 G/DL
ALKALINE PHOSPHATASE: 53
ALT (SGPT): 26 IU/L
AST: 21
BILIRUBIN, TOTAL: 0.6 MG/DL
BUN: 17
CALCIUM: 9.6
CARBON DIOXIDE: 26
CHLORIDE: 106
CHOL/HDLC RATIO: 2.6
CHOLESTEROL, TOTAL: 126
CREATININE: 0.98 MG/DL (ref 0.6–1.2)
EGFR IF NONAFRICN AM: 82
GLOBULIN: 2.5
GLUCOSE: 100
HDL CHOL: 48
HEMOGLOBIN A1C: 5.6
LDL CHOLESTEROL: 58 MG/DL (ref ?–130)
NON HDL CHOL: 78
POTASSIUM: 4.7
PROTEIN, TOTAL: 6.7
SODIUM: 138
TRIGLYCERIDES: 118 MG/DL

## 2022-12-09 PROCEDURE — 90662 IIV NO PRSV INCREASED AG IM: CPT | Performed by: FAMILY MEDICINE

## 2022-12-09 PROCEDURE — G0008 ADMIN INFLUENZA VIRUS VAC: HCPCS | Performed by: FAMILY MEDICINE

## 2022-12-09 PROCEDURE — 99214 OFFICE O/P EST MOD 30 MIN: CPT | Performed by: FAMILY MEDICINE

## 2022-12-09 PROCEDURE — 3074F SYST BP LT 130 MM HG: CPT | Performed by: FAMILY MEDICINE

## 2022-12-09 PROCEDURE — 1126F AMNT PAIN NOTED NONE PRSNT: CPT | Performed by: FAMILY MEDICINE

## 2022-12-09 PROCEDURE — 3078F DIAST BP <80 MM HG: CPT | Performed by: FAMILY MEDICINE

## 2022-12-14 ENCOUNTER — OFFICE VISIT (OUTPATIENT)
Dept: ORTHOPEDICS CLINIC | Facility: CLINIC | Age: 72
End: 2022-12-14
Payer: MEDICARE

## 2022-12-14 VITALS — BODY MASS INDEX: 33.13 KG/M2 | HEIGHT: 73 IN | WEIGHT: 250 LBS

## 2022-12-14 DIAGNOSIS — M72.2 PLANTAR FASCIITIS: Primary | ICD-10-CM

## 2022-12-14 DIAGNOSIS — M77.32 HEEL SPUR, LEFT: ICD-10-CM

## 2022-12-14 DIAGNOSIS — M79.674 PAIN OF TOE OF RIGHT FOOT: ICD-10-CM

## 2022-12-14 PROCEDURE — 99204 OFFICE O/P NEW MOD 45 MIN: CPT | Performed by: PODIATRIST

## 2022-12-14 PROCEDURE — 20551 NJX 1 TENDON ORIGIN/INSJ: CPT | Performed by: PODIATRIST

## 2022-12-14 PROCEDURE — 3008F BODY MASS INDEX DOCD: CPT | Performed by: PODIATRIST

## 2022-12-14 PROCEDURE — 1125F AMNT PAIN NOTED PAIN PRSNT: CPT | Performed by: PODIATRIST

## 2022-12-14 RX ORDER — BETAMETHASONE SODIUM PHOSPHATE AND BETAMETHASONE ACETATE 3; 3 MG/ML; MG/ML
4 INJECTION, SUSPENSION INTRA-ARTICULAR; INTRALESIONAL; INTRAMUSCULAR; SOFT TISSUE ONCE
Status: COMPLETED | OUTPATIENT
Start: 2022-12-14 | End: 2022-12-14

## 2022-12-14 RX ADMIN — BETAMETHASONE SODIUM PHOSPHATE AND BETAMETHASONE ACETATE 4.2 MG: 3; 3 INJECTION, SUSPENSION INTRA-ARTICULAR; INTRALESIONAL; INTRAMUSCULAR; SOFT TISSUE at 10:47:00

## 2022-12-29 ENCOUNTER — TELEPHONE (OUTPATIENT)
Dept: FAMILY MEDICINE CLINIC | Facility: CLINIC | Age: 72
End: 2022-12-29

## 2022-12-29 DIAGNOSIS — N40.0 BENIGN PROSTATIC HYPERPLASIA WITHOUT LOWER URINARY TRACT SYMPTOMS: Primary | ICD-10-CM

## 2022-12-29 NOTE — TELEPHONE ENCOUNTER
Pt calling and asking for urology referral with Dr. Faviola Choi. He has an appointment today, they don't have one on file. He said it is routine urology appointment. Referral pended.

## 2022-12-29 NOTE — TELEPHONE ENCOUNTER
Referred to Provider Information:  Provider Address Phone   Roberto Enriquez [phone] [facility] [phone]     Referral signed.

## 2023-01-10 ENCOUNTER — TELEPHONE (OUTPATIENT)
Dept: PHYSICAL THERAPY | Facility: HOSPITAL | Age: 73
End: 2023-01-10

## 2023-01-11 ENCOUNTER — OFFICE VISIT (OUTPATIENT)
Dept: PHYSICAL THERAPY | Age: 73
End: 2023-01-11
Attending: PODIATRIST
Payer: MEDICARE

## 2023-01-11 DIAGNOSIS — M72.2 PLANTAR FASCIITIS: ICD-10-CM

## 2023-01-11 DIAGNOSIS — M77.32 HEEL SPUR, LEFT: ICD-10-CM

## 2023-01-11 PROCEDURE — 97110 THERAPEUTIC EXERCISES: CPT | Performed by: PHYSICAL THERAPIST

## 2023-01-11 PROCEDURE — 97140 MANUAL THERAPY 1/> REGIONS: CPT | Performed by: PHYSICAL THERAPIST

## 2023-01-11 PROCEDURE — 97161 PT EVAL LOW COMPLEX 20 MIN: CPT | Performed by: PHYSICAL THERAPIST

## 2023-01-13 ENCOUNTER — OFFICE VISIT (OUTPATIENT)
Dept: PHYSICAL THERAPY | Age: 73
End: 2023-01-13
Attending: PODIATRIST
Payer: MEDICARE

## 2023-01-13 PROCEDURE — 97110 THERAPEUTIC EXERCISES: CPT | Performed by: PHYSICAL THERAPIST

## 2023-01-16 ENCOUNTER — APPOINTMENT (OUTPATIENT)
Dept: PHYSICAL THERAPY | Age: 73
End: 2023-01-16
Attending: PODIATRIST
Payer: MEDICARE

## 2023-01-19 ENCOUNTER — OFFICE VISIT (OUTPATIENT)
Dept: PHYSICAL THERAPY | Age: 73
End: 2023-01-19
Attending: PODIATRIST
Payer: MEDICARE

## 2023-01-19 PROCEDURE — 97110 THERAPEUTIC EXERCISES: CPT | Performed by: PHYSICAL THERAPIST

## 2023-01-24 ENCOUNTER — APPOINTMENT (OUTPATIENT)
Dept: PHYSICAL THERAPY | Age: 73
End: 2023-01-24
Attending: PODIATRIST
Payer: MEDICARE

## 2023-01-24 ENCOUNTER — OFFICE VISIT (OUTPATIENT)
Dept: PHYSICAL THERAPY | Age: 73
End: 2023-01-24
Attending: PODIATRIST
Payer: MEDICARE

## 2023-01-24 PROCEDURE — 97140 MANUAL THERAPY 1/> REGIONS: CPT | Performed by: PHYSICAL THERAPIST

## 2023-01-24 PROCEDURE — 97110 THERAPEUTIC EXERCISES: CPT | Performed by: PHYSICAL THERAPIST

## 2023-01-26 ENCOUNTER — OFFICE VISIT (OUTPATIENT)
Dept: PHYSICAL THERAPY | Age: 73
End: 2023-01-26
Attending: PODIATRIST
Payer: MEDICARE

## 2023-01-26 PROCEDURE — 97110 THERAPEUTIC EXERCISES: CPT | Performed by: PHYSICAL THERAPIST

## 2023-01-26 PROCEDURE — 97140 MANUAL THERAPY 1/> REGIONS: CPT | Performed by: PHYSICAL THERAPIST

## 2023-03-15 ENCOUNTER — OFFICE VISIT (OUTPATIENT)
Dept: ORTHOPEDICS CLINIC | Facility: CLINIC | Age: 73
End: 2023-03-15
Payer: MEDICARE

## 2023-03-15 VITALS — WEIGHT: 250 LBS | HEIGHT: 73 IN | BODY MASS INDEX: 33.13 KG/M2

## 2023-03-15 DIAGNOSIS — M72.2 PLANTAR FASCIITIS: Primary | ICD-10-CM

## 2023-03-15 DIAGNOSIS — M77.32 HEEL SPUR, LEFT: ICD-10-CM

## 2023-03-15 PROCEDURE — 3008F BODY MASS INDEX DOCD: CPT | Performed by: PODIATRIST

## 2023-03-15 PROCEDURE — 99213 OFFICE O/P EST LOW 20 MIN: CPT | Performed by: PODIATRIST

## 2023-03-15 PROCEDURE — 1126F AMNT PAIN NOTED NONE PRSNT: CPT | Performed by: PODIATRIST

## 2023-03-15 NOTE — PROGRESS NOTES
[facility] Progress Note    Subjective:     Mr. Say Graves is here for follow up of PF and heel pain  PT been very effective  Shot with me was helpful this visit. He had a little \"twinge\" of his heel the other day and so decided to come in in case another shot would be necessary  He has some good OTC Spenco inserts and uses good tennis shoes      Objective:     Exam left foot minimal to no palpable tenderness about the heel and the arch. There is some residual heel pain directly plantar and may be more of a bursitis actually. No side to side compression pain of the heel                  Assessment/Plan:     Diagnoses and all orders for this visit:    Plantar fasciitis    Heel spur, left        He is doing well and I think we can avoid a cortisone shot today. He can always come back for another as needed but we talked again about the fact that maintaining his exercises from PT, continued good shoes arch supports and then some sort of sports sandal in the home is jane            Biju Colon. Brandon Reno DPM  [facility]    [employer] speech recognition software was used to prepare this note. If a word or phrase is confusing, it is likely do to a failure of recognition. Please contact me with any questions or clarifications.

## 2023-03-17 PROCEDURE — 3044F HG A1C LEVEL LT 7.0%: CPT | Performed by: FAMILY MEDICINE

## 2023-03-20 ENCOUNTER — LAB ENCOUNTER (OUTPATIENT)
Dept: LAB | Age: 73
End: 2023-03-20
Attending: INTERNAL MEDICINE
Payer: MEDICARE

## 2023-03-20 DIAGNOSIS — Z01.818 PRE-OP TESTING: ICD-10-CM

## 2023-03-20 NOTE — TELEPHONE ENCOUNTER
Pt requesting refill of   Requested Prescriptions     Pending Prescriptions Disp Refills   • METFORMIN HCL  MG Oral Tablet 24 Hr [Pharmacy Med Name: METFORMIN HYDROCHLORIDE  MG Tablet Extended Release 24 Hour] 90 tablet 0     Sig: TAKE 1 TABL Opt out

## 2023-03-21 ENCOUNTER — OFFICE VISIT (OUTPATIENT)
Dept: FAMILY MEDICINE CLINIC | Facility: CLINIC | Age: 73
End: 2023-03-21
Payer: MEDICARE

## 2023-03-21 VITALS
HEART RATE: 74 BPM | RESPIRATION RATE: 18 BRPM | SYSTOLIC BLOOD PRESSURE: 130 MMHG | OXYGEN SATURATION: 96 % | TEMPERATURE: 97 F | DIASTOLIC BLOOD PRESSURE: 70 MMHG | WEIGHT: 258 LBS | BODY MASS INDEX: 35.33 KG/M2 | HEIGHT: 71.65 IN

## 2023-03-21 DIAGNOSIS — D12.6 TUBULAR ADENOMA OF COLON: ICD-10-CM

## 2023-03-21 DIAGNOSIS — I10 ESSENTIAL HYPERTENSION: ICD-10-CM

## 2023-03-21 DIAGNOSIS — G89.29 CHRONIC PAIN OF RIGHT KNEE: ICD-10-CM

## 2023-03-21 DIAGNOSIS — I25.118 CORONARY ARTERY DISEASE OF NATIVE ARTERY OF NATIVE HEART WITH STABLE ANGINA PECTORIS (HCC): ICD-10-CM

## 2023-03-21 DIAGNOSIS — E11.51 DM (DIABETES MELLITUS) TYPE II, CONTROLLED, WITH PERIPHERAL VASCULAR DISORDER (HCC): ICD-10-CM

## 2023-03-21 DIAGNOSIS — M48.02 FORAMINAL STENOSIS OF CERVICAL REGION: ICD-10-CM

## 2023-03-21 DIAGNOSIS — G47.33 OSA ON CPAP: ICD-10-CM

## 2023-03-21 DIAGNOSIS — Z95.1 S/P CABG (CORONARY ARTERY BYPASS GRAFT): ICD-10-CM

## 2023-03-21 DIAGNOSIS — K57.90 DIVERTICULOSIS: ICD-10-CM

## 2023-03-21 DIAGNOSIS — K22.70 BARRETT'S ESOPHAGUS DETERMINED BY ENDOSCOPY: ICD-10-CM

## 2023-03-21 DIAGNOSIS — R63.5 WEIGHT GAIN: ICD-10-CM

## 2023-03-21 DIAGNOSIS — Z95.828 STATUS POST INSERTION OF ILIAC ARTERY STENT: ICD-10-CM

## 2023-03-21 DIAGNOSIS — M47.812 FACET ARTHRITIS OF CERVICAL REGION: ICD-10-CM

## 2023-03-21 DIAGNOSIS — M25.561 CHRONIC PAIN OF RIGHT KNEE: ICD-10-CM

## 2023-03-21 DIAGNOSIS — I74.09 AORTOILIAC OCCLUSIVE DISEASE (HCC): ICD-10-CM

## 2023-03-21 DIAGNOSIS — H90.3 SENSORINEURAL HEARING LOSS (SNHL) OF BOTH EARS: ICD-10-CM

## 2023-03-21 DIAGNOSIS — N52.9 VASCULOGENIC ERECTILE DYSFUNCTION, UNSPECIFIED VASCULOGENIC ERECTILE DYSFUNCTION TYPE: ICD-10-CM

## 2023-03-21 DIAGNOSIS — I70.203 BILATERAL ATHEROSCLEROSIS OF LEGS (HCC): ICD-10-CM

## 2023-03-21 DIAGNOSIS — M47.816 ARTHRITIS OF LUMBAR SPINE: ICD-10-CM

## 2023-03-21 DIAGNOSIS — Z99.89 OSA ON CPAP: ICD-10-CM

## 2023-03-21 DIAGNOSIS — M47.22 OSTEOARTHRITIS OF SPINE WITH RADICULOPATHY, CERVICAL REGION: ICD-10-CM

## 2023-03-21 DIAGNOSIS — I73.9 PAD (PERIPHERAL ARTERY DISEASE) (HCC): ICD-10-CM

## 2023-03-21 DIAGNOSIS — I70.202 ATHEROSCLEROSIS OF ARTERY OF LEFT LOWER EXTREMITY (HCC): ICD-10-CM

## 2023-03-21 DIAGNOSIS — K76.0 FATTY LIVER: ICD-10-CM

## 2023-03-21 DIAGNOSIS — E78.2 HYPERLIPIDEMIA, MIXED: ICD-10-CM

## 2023-03-21 DIAGNOSIS — Z23 NEED FOR VACCINATION: ICD-10-CM

## 2023-03-21 DIAGNOSIS — Z12.5 SCREENING FOR MALIGNANT NEOPLASM OF PROSTATE: ICD-10-CM

## 2023-03-21 DIAGNOSIS — I71.43 INFRARENAL ABDOMINAL AORTIC ANEURYSM (AAA) WITHOUT RUPTURE (HCC): ICD-10-CM

## 2023-03-21 DIAGNOSIS — M51.36 LUMBAR DEGENERATIVE DISC DISEASE: ICD-10-CM

## 2023-03-21 DIAGNOSIS — I77.9 BILATERAL CAROTID ARTERY DISEASE, UNSPECIFIED TYPE (HCC): ICD-10-CM

## 2023-03-21 DIAGNOSIS — Z00.00 ENCOUNTER FOR ANNUAL HEALTH EXAMINATION: Primary | ICD-10-CM

## 2023-03-21 DIAGNOSIS — N40.0 BENIGN PROSTATIC HYPERPLASIA WITHOUT LOWER URINARY TRACT SYMPTOMS: ICD-10-CM

## 2023-03-21 PROBLEM — M77.32 HEEL SPUR, LEFT: Status: RESOLVED | Noted: 2022-12-14 | Resolved: 2023-03-21

## 2023-03-21 PROBLEM — M72.2 PLANTAR FASCIITIS: Status: RESOLVED | Noted: 2022-12-14 | Resolved: 2023-03-21

## 2023-03-21 PROBLEM — J42 CHRONIC BRONCHITIS, UNSPECIFIED CHRONIC BRONCHITIS TYPE (HCC): Status: RESOLVED | Noted: 2023-03-21 | Resolved: 2023-03-21

## 2023-03-21 PROBLEM — J42 CHRONIC BRONCHITIS, UNSPECIFIED CHRONIC BRONCHITIS TYPE (HCC): Status: ACTIVE | Noted: 2023-03-21

## 2023-03-21 LAB — SARS-COV-2 RNA RESP QL NAA+PROBE: NOT DETECTED

## 2023-03-21 PROCEDURE — 3078F DIAST BP <80 MM HG: CPT | Performed by: FAMILY MEDICINE

## 2023-03-21 PROCEDURE — 99397 PER PM REEVAL EST PAT 65+ YR: CPT | Performed by: FAMILY MEDICINE

## 2023-03-21 PROCEDURE — G0010 ADMIN HEPATITIS B VACCINE: HCPCS | Performed by: FAMILY MEDICINE

## 2023-03-21 PROCEDURE — 3008F BODY MASS INDEX DOCD: CPT | Performed by: FAMILY MEDICINE

## 2023-03-21 PROCEDURE — 3075F SYST BP GE 130 - 139MM HG: CPT | Performed by: FAMILY MEDICINE

## 2023-03-21 PROCEDURE — 1126F AMNT PAIN NOTED NONE PRSNT: CPT | Performed by: FAMILY MEDICINE

## 2023-03-21 PROCEDURE — 96160 PT-FOCUSED HLTH RISK ASSMT: CPT | Performed by: FAMILY MEDICINE

## 2023-03-21 PROCEDURE — G0439 PPPS, SUBSEQ VISIT: HCPCS | Performed by: FAMILY MEDICINE

## 2023-03-21 PROCEDURE — 90746 HEPB VACCINE 3 DOSE ADULT IM: CPT | Performed by: FAMILY MEDICINE

## 2023-03-21 RX ORDER — EZETIMIBE 10 MG/1
10 TABLET ORAL NIGHTLY
Qty: 90 TABLET | Refills: 1 | Status: SHIPPED | OUTPATIENT
Start: 2023-03-21

## 2023-03-21 RX ORDER — METOPROLOL SUCCINATE 50 MG/1
50 TABLET, EXTENDED RELEASE ORAL DAILY
Qty: 90 TABLET | Refills: 1 | Status: SHIPPED | OUTPATIENT
Start: 2023-03-21

## 2023-03-21 RX ORDER — LISINOPRIL 5 MG/1
5 TABLET ORAL DAILY
Qty: 90 TABLET | Refills: 1 | Status: SHIPPED | OUTPATIENT
Start: 2023-03-21

## 2023-03-21 RX ORDER — ROSUVASTATIN CALCIUM 40 MG/1
40 TABLET, COATED ORAL NIGHTLY
Qty: 90 TABLET | Refills: 1 | Status: SHIPPED | OUTPATIENT
Start: 2023-03-21

## 2023-03-21 RX ORDER — SEMAGLUTIDE 1.34 MG/ML
1 INJECTION, SOLUTION SUBCUTANEOUS WEEKLY
Qty: 9 ML | Refills: 0 | Status: SHIPPED | OUTPATIENT
Start: 2023-03-21

## 2023-03-21 RX ORDER — AMLODIPINE BESYLATE 5 MG/1
5 TABLET ORAL DAILY
Qty: 90 TABLET | Refills: 1 | Status: SHIPPED | OUTPATIENT
Start: 2023-03-21

## 2023-03-21 NOTE — PATIENT INSTRUCTIONS
Earlene Ennis's SCREENING SCHEDULE   Tests on this list are recommended by your physician but may not be covered, or covered at this frequency, by your insurer. Please check with your insurance carrier before scheduling to verify coverage.    PREVENTATIVE SERVICES FREQUENCY &  COVERAGE DETAILS LAST COMPLETION DATE   Diabetes Screening    Fasting Blood Sugar / Glucose    One screening every 12 months if never tested or if previously tested but not diagnosed with pre-diabetes   One screening every 6 months if diagnosed with pre-diabetes Lab Results   Component Value Date     (H) 08/30/2022        Cardiovascular Disease Screening    Lipid Panel  Cholesterol  Lipoprotein (HDL)  Triglycerides Covered every 5 years for all Medicare beneficiaries without apparent signs or symptoms of cardiovascular disease Lab Results   Component Value Date    CHOLEST 126 12/07/2022    HDL 48 12/07/2022    LDL 58 12/07/2022    TRIG 108 08/30/2022         Electrocardiogram (EKG)   Covered if needed at Welcome to Medicare, and non-screening if indicated for medical reasons 07/16/2020      Ultrasound Screening for Abdominal Aortic Aneurysm (AAA) Covered once in a lifetime for one of the following risk factors    Men who are 73-68 years old and have ever smoked    Anyone with a family history -     Colorectal Cancer Screening  Covered for ages 52-80; only need ONE of the following:    Colonoscopy   Covered every 10 years    Covered every 2 years if patient is at high risk or previous colonoscopy was abnormal 01/14/2022    Colorectal Cancer Screening due on 01/14/2025    Flexible Sigmoidoscopy   Covered every 4 years -    Fecal Occult Blood Test Covered annually -   Prostate Cancer Screening    Prostate-Specific Antigen (PSA) Annually No results found for: PSA  PSA due on 09/09/2023   Immunizations    Influenza Covered once per flu season  Please get every year 12/09/2022  No recommendations at this time    Pneumococcal Each vaccine (Koicbfb61 & [id number]) covered once after 65 Prevnar 13: 10/11/2017    Xtcquhflf74: 09/27/2019     No recommendations at this time    Hepatitis B One screening covered for patients with certain risk factors   -  No recommendations at this time    Tetanus Toxoid Not covered by Medicare Part B unless medically necessary (cut with metal); may be covered with your pharmacy prescription benefits -    Tetanus, Diptheria and Pertusis TD and TDaP Not covered by Medicare Part B -  No recommendations at this time    Zoster Not covered by Medicare Part B; may be covered with your pharmacy  prescription benefits -  No recommendations at this time       Diabetes      Hemoglobin A1C Annually; if last result is elevated, may be asked to retest more frequently.     Medicare covers every 3 months Lab Results   Component Value Date     08/30/2022    A1C 5.6 12/07/2022       No recommendations at this time    Creat/alb ratio Annually Lab Results   Component Value Date    MICROALBCREA 4.8 06/14/2022    MALBCRECALC 12.9 09/25/2012       LDL Annually Lab Results   Component Value Date    LDL 58 12/07/2022       Dilated Eye Exam Annually 09/19/2022       Annual Monitoring of Persistent Medications (ACE/ARB, digoxin diuretics, anticonvulsants)    Potassium Annually Lab Results   Component Value Date    K 4.7 12/07/2022         Creatinine   Annually Lab Results   Component Value Date    CREATSERUM 1.01 08/30/2022         BUN Annually Lab Results   Component Value Date    BUN 17 12/07/2022       Drug Serum Conc Annually No results found for: [address], [facility], [address] financial assistance with medication: [phone] to see if qualify   [employer] provides Silver sneakers where  to work out for free or reduced cost at [phone]  [employer] provides free transportation - [address] [facility] card: [address] on Wheels: [address] home health nurse: [place] line if feeling [facility]: [phone]

## 2023-03-27 LAB
ALBUMIN/GLOBULIN RATIO: 1.8
ALBUMIN: 4.3 G/DL
ALKALINE PHOSPHATASE: 59
ALT: 32
AST: 25
BILIRUBIN, TOTAL: 0.6 MG/DL
BUN: 15
CALCIUM: 9.8
CARBON DIOXIDE: 28
CHLORIDE: 104
CHOL/HDL RATIO: 2.9
CHOLESTEROL, TOTAL: 137
CREATININE: 0.99 MG/DL (ref 0.6–1.2)
GLOBULIN: 2.4
GLUCOSE: 99
HDL CHOL: 48
HEMOGLOBIN A1C: 5.6 %
LDL CHOLESTEROL: 67 MG/DL (ref ?–130)
NON HDL CHOL: 89
POTASSIUM: 4.9
PROTEIN, TOTAL: 6.7
SODIUM: 138
TRIGLYCERIDES: 134 MG/DL

## 2023-03-29 ENCOUNTER — TELEPHONE (OUTPATIENT)
Dept: FAMILY MEDICINE CLINIC | Facility: CLINIC | Age: 73
End: 2023-03-29

## 2023-04-06 ENCOUNTER — TELEPHONE (OUTPATIENT)
Dept: FAMILY MEDICINE CLINIC | Facility: CLINIC | Age: 73
End: 2023-04-06

## 2023-04-06 NOTE — TELEPHONE ENCOUNTER
Pt called stating his forms for his medication Ozempic were filled out incorrect and the forms were sent over again to be filled out and faxed. Pt wants to be called once forms have been faxed.

## 2023-04-06 NOTE — TELEPHONE ENCOUNTER
Spoke to patient he states the dosage is incorrect. He states there should be a note attached with details.

## 2023-04-07 ENCOUNTER — TELEPHONE (OUTPATIENT)
Dept: FAMILY MEDICINE CLINIC | Facility: CLINIC | Age: 73
End: 2023-04-07

## 2023-04-07 NOTE — PROGRESS NOTES
Patient's dose was increased to on Ozempic 2 mg weekly on 3/21/2023. He MG 30 nurses please review the paperwork and resent to [facility].     Thank you

## 2023-04-07 NOTE — TELEPHONE ENCOUNTER
Forms reviewed completed and signed by provider Dr. Mani Webster. Forms faxed to [address] [place] [address].  Fax confirmed 4/7/23

## 2023-04-07 NOTE — TELEPHONE ENCOUNTER
Patient's dose was increased to Ozempic 2 mg at 3/21/2023 visit. [facility] nurses-please review paperwork submitted for notable medication assistance.     Thank you

## 2023-04-18 ENCOUNTER — ANESTHESIA EVENT (OUTPATIENT)
Dept: ENDOSCOPY | Facility: HOSPITAL | Age: 73
End: 2023-04-18
Payer: MEDICARE

## 2023-04-19 ENCOUNTER — HOSPITAL ENCOUNTER (OUTPATIENT)
Facility: HOSPITAL | Age: 73
Setting detail: HOSPITAL OUTPATIENT SURGERY
Discharge: HOME | End: 2023-04-19
Attending: INTERNAL MEDICINE | Admitting: INTERNAL MEDICINE
Payer: MEDICARE

## 2023-04-19 ENCOUNTER — ANESTHESIA (OUTPATIENT)
Dept: ENDOSCOPY | Facility: HOSPITAL | Age: 73
End: 2023-04-19
Payer: MEDICARE

## 2023-04-19 VITALS
WEIGHT: 250 LBS | BODY MASS INDEX: 33.13 KG/M2 | HEIGHT: 73 IN | DIASTOLIC BLOOD PRESSURE: 65 MMHG | RESPIRATION RATE: 18 BRPM | SYSTOLIC BLOOD PRESSURE: 127 MMHG | HEART RATE: 65 BPM | OXYGEN SATURATION: 95 % | TEMPERATURE: 98 F

## 2023-04-19 DIAGNOSIS — K22.70 BARRETT'S ESOPHAGUS WITHOUT DYSPLASIA: ICD-10-CM

## 2023-04-19 LAB — GLUCOSE BLD-MCNC: 96 MG/DL (ref 70–99)

## 2023-04-19 PROCEDURE — 88305 TISSUE EXAM BY PATHOLOGIST: CPT | Performed by: INTERNAL MEDICINE

## 2023-04-19 PROCEDURE — 82962 GLUCOSE BLOOD TEST: CPT

## 2023-04-19 PROCEDURE — 0DB48ZX EXCISION OF ESOPHAGOGASTRIC JUNCTION, VIA NATURAL OR ARTIFICIAL OPENING ENDOSCOPIC, DIAGNOSTIC: ICD-10-PCS | Performed by: INTERNAL MEDICINE

## 2023-04-19 RX ORDER — NICOTINE POLACRILEX 4 MG
30 LOZENGE BUCCAL
Status: DISCONTINUED | OUTPATIENT
Start: 2023-04-19 | End: 2023-04-19

## 2023-04-19 RX ORDER — METOCLOPRAMIDE HYDROCHLORIDE 5 MG/ML
10 INJECTION INTRAMUSCULAR; INTRAVENOUS AS NEEDED
Status: DISCONTINUED | OUTPATIENT
Start: 2023-04-19 | End: 2023-04-19

## 2023-04-19 RX ORDER — LIDOCAINE HYDROCHLORIDE 10 MG/ML
INJECTION, SOLUTION EPIDURAL; INFILTRATION; INTRACAUDAL; PERINEURAL AS NEEDED
Status: DISCONTINUED | OUTPATIENT
Start: 2023-04-19 | End: 2023-04-19 | Stop reason: SURG

## 2023-04-19 RX ORDER — HYDROMORPHONE HYDROCHLORIDE 1 MG/ML
0.4 INJECTION, SOLUTION INTRAMUSCULAR; INTRAVENOUS; SUBCUTANEOUS EVERY 5 MIN PRN
Status: DISCONTINUED | OUTPATIENT
Start: 2023-04-19 | End: 2023-04-19

## 2023-04-19 RX ORDER — SODIUM CHLORIDE, SODIUM LACTATE, POTASSIUM CHLORIDE, CALCIUM CHLORIDE 600; 310; 30; 20 MG/100ML; MG/100ML; MG/100ML; MG/100ML
INJECTION, SOLUTION INTRAVENOUS CONTINUOUS
Status: DISCONTINUED | OUTPATIENT
Start: 2023-04-19 | End: 2023-04-19

## 2023-04-19 RX ORDER — DEXTROSE MONOHYDRATE 25 G/50ML
50 INJECTION, SOLUTION INTRAVENOUS
Status: DISCONTINUED | OUTPATIENT
Start: 2023-04-19 | End: 2023-04-19

## 2023-04-19 RX ORDER — ONDANSETRON 2 MG/ML
4 INJECTION INTRAMUSCULAR; INTRAVENOUS AS NEEDED
Status: DISCONTINUED | OUTPATIENT
Start: 2023-04-19 | End: 2023-04-19

## 2023-04-19 RX ORDER — NICOTINE POLACRILEX 4 MG
15 LOZENGE BUCCAL
Status: DISCONTINUED | OUTPATIENT
Start: 2023-04-19 | End: 2023-04-19

## 2023-04-19 RX ORDER — NALOXONE HYDROCHLORIDE 0.4 MG/ML
80 INJECTION, SOLUTION INTRAMUSCULAR; INTRAVENOUS; SUBCUTANEOUS AS NEEDED
Status: DISCONTINUED | OUTPATIENT
Start: 2023-04-19 | End: 2023-04-19

## 2023-04-19 RX ADMIN — SODIUM CHLORIDE, SODIUM LACTATE, POTASSIUM CHLORIDE, CALCIUM CHLORIDE: 600; 310; 30; 20 INJECTION, SOLUTION INTRAVENOUS at 10:30:00

## 2023-04-19 RX ADMIN — LIDOCAINE HYDROCHLORIDE 50 MG: 10 INJECTION, SOLUTION EPIDURAL; INFILTRATION; INTRACAUDAL; PERINEURAL at 10:21:00

## 2023-04-19 RX ADMIN — LIDOCAINE HYDROCHLORIDE 50 MG: 10 INJECTION, SOLUTION EPIDURAL; INFILTRATION; INTRACAUDAL; PERINEURAL at 10:22:00

## 2023-04-19 NOTE — DISCHARGE INSTRUCTIONS
Home Care Instructions for Gastroscopy with Biopsy with Sedation    Diet:  - Resume your regular diet as tolerated unless otherwise instructed. - Start with light meals to minimize bloating.  - Do not drink alcohol today. Medication:  - If you have questions about resuming your normal medications, please contact your Primary Care Physician. Activities:  - Take it easy today. Do not return to work today. - Do not drive today. - Do not operate any machinery today (including kitchen equipment). Gastroscopy:  - You may have a sore throat for 2-3 days following the exam. This is normal. Gargling with warm salt water (1/2 tsp salt to 1 glass warm water) or using throat lozenges will help. - If you experience any sharp pain in your neck, abdomen or chest, vomiting of blood, oral temperature over 100 degrees Fahrenheit, light-headedness or dizziness, or any other problems, contact your doctor. **If unable to reach your doctor, please go to the [facility] Emergency Room**    - Your referring physician will receive a full report of your examination.  - If you do not hear from your doctor's office within two weeks of your biopsy, please call them for your results. You may be able to see your laboratory results in [facility] between 4 and 7 business days. In some cases, your physician may not have viewed the results before they are released to [address] If you have questions regarding your results contact the physician who ordered the test/exam by phone or via [address] by choosing \"Ask a Medical Question. \"

## 2023-04-19 NOTE — INTERVAL H&P NOTE
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 I discussed with the patient and/or legal representative the potential benefits, risks and side effects of this procedure; the likelihood of the patient achieving goals; and potential problems that might occur during recuperation. I discussed reasonable alternatives to the procedure, including risks, benefits and side effects related to the alternatives and risks related to not receiving this procedure. We will proceed with procedure as planned.

## 2023-04-19 NOTE — ANESTHESIA POSTPROCEDURE EVALUATION
923 [facility] Patient Status:  Hospital Outpatient Surgery   Age/Gender 67year old male MRN [id number]   Location [address] [facility] 28 Attending Kareen Merritt, 160[facility] # 0 PCP Laurent Roe DO       Anesthesia Post-op Note    ESOPHAGOGASTRODUODENOSCOPY (EGD) with biopsies    Procedure Summary     Date: 04/19/23 Room / Location: [facility] ENDOSCOPY 02 / 1404 [facility] ENDOSCOPY    Anesthesia Start: 2924 Anesthesia Stop: 56    Procedure: ESOPHAGOGASTRODUODENOSCOPY (EGD) with biopsies Diagnosis:       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      (barretts esophagus, hiatal hernia)    Surgeons: Kareen Merritt DO Anesthesiologist: Mesha Rothman MD    Anesthesia Type: MAC ASA Status: 3          Anesthesia Type: MAC    Vitals Value Taken Time   /62 04/19/23 1030   Temp  04/19/23 1031   Pulse 86 04/19/23 1030   Resp 18 04/19/23 1030   SpO2 94 % 04/19/23 1030       Patient Location: Endoscopy    Anesthesia Type: MAC    Airway Patency: patent    Postop Pain Control: adequate    Mental Status: mildly sedated but able to meaningfully participate in the post-anesthesia evaluation    Nausea/Vomiting: none    Cardiopulmonary/Hydration status: stable euvolemic    Complications: no apparent anesthesia related complications    Postop vital signs: stable    Comments: The patient was responsive in a meaningful way and demonstrated a good airway. Full report, identifications, history, procedure, anesthesia course, recovery expectations with chance for questions was provided to a responsible recovery RN from the endoscopy staff. Dental Exam: Unchanged from Preop    Patient to be discharged home when criteria met. Report to Elvie Bergman RN.

## 2023-04-26 NOTE — LETTER
[facility] Hematology Consultation    Outpatient Visit Note:    Patient: Sruthi Mac  MRN: [id number]  : 1988  STEVE: 2023    Reason for Consultation:  JAK2+ MPN    Assessment:  In summary, Sruthi Mac is a 34 year old woman with [id number] positive myeloproliferative neoplasm and arterial thrombosis, diagnosed in spring 2021.    1. WND1F100T+ MPN, likely ET v prefibrotic MF  2. Arterial thrombosis secondary to #1  3. Preconception counseling    High risk (IPSET>2) ET    Sruthi is currently attempting to get pregnant. Due to this, as well as the recent publication of the CONTINUATION trials demonstrating compared to hydroxyurea, response to ropeginterferon fouzia-2b continued to increase over time with improved responses compared with hydroxyurea at 36 months, She has been on Pegasys since 21. She has tolerated medication well with recent platelets in the normal range, though it is very expensive and she can no longer afford this. We will check her allele frequency of the JAK2 V617F gene, and if lower than prior value in  this would be evidence that her thrombosis risk is lower. Held Pegasys starting 2023 pending reevaluation after allele frequency comes back. NGS panel is being drawn today (23). Of note, she came off Pegasys due to financial concerns, but her insurance has changed and she is not certain if it would be covered now or not (she previously paid for her Lovenox until she hit her deductible, but has not been charged for the Lovenox since the insurance switch).     She will be 13 weeks pregnant tomorrow (23).  She should continue on LMWH and baby aspirin. If counts are increasing, may need to discuss interferon again.  Will get monthly CBCs and follow up with Dr. Irving around weeks 26-28.  Following with Ayad CEDILLO.  Reports 7 week US looked good, meets with genetic counseling later this week and will get a level 2 US  [address] FOR BLOOD OR BLOOD COMPONENTS      In the course of your treatment, it may become necessary to administer a transfusion of blood or blood components.  This form provides basic information concerning this proc explain the alternatives to you if it has not already been done. I,Jatinder Ennis, have read/had read to me the above. I understand the matters bearing on the decision whether or not to authorize a transfusion of blood or blood components.  I have no questi in the near future.    For her arterial thrombosis, she is currently on LMWH 100 mg BID during preconception, pregnancy and post-partum. (Xa level too high on 120mg BID, Xa level acceptable on 100mg BID) We briefly discussed that during delivery she will be off anticoagulation, but that shortly after delivery we would resume anticoagulation.     She will continue to follow with obstetrics.  For future reference, her antiphospholipid antibody testing was negative (missing anti-cardiolipin, but negative beta-2 glycoprotein and lupus anticoagulant) during the clotting episode. MHTFR testing normal (sent in 2021 by [facility])--- of note--- in 2013 both the American College of Medical Genetics as well as the American College of Obstetricians and Gynecologists recommended against screening for hyper homocystinuria as well as MTHFR mutations as there has was found to be a lack of association between these conditions and negative pregnancy outcomes.  Overall, compared to the general population, patients with MTHFR mutations and elevated homocysteine have an increased risk for cardiovascular disease, VTE/PE and adverse pregnancy outcomes, however this risk is still low and it is NOT considered a thrombophilia.     Continue iron supplementation every other day.     Plan:  1. Hold interferon as above. Will reevaluate after allele frequency returns (drawing NGS sample today, 4/26/23) She unfortunately cannot take the hydroxyurea while she is pregnant.  If counts are increasing, may need to reconsider interferon.  2. Will need to monitor her CBC monthly while pregnant.  Prefers lab appointments at [facility].  3. Continue oral iron supplement every other day  4. Continue with LMWH 100 mg subcutaneous twice daily.   5. Follow-up with Dr. Irving around week 26-28 of pregnancy (around July 27 - Aug 10)    The patient is given our center's contact information and is instructed to call if she should have any further questions or  concerns.  .     36 minutes spent on the date of the encounter doing chart review, review of test results, patient visit and documentation     VESTA Carrasco CNP  [facility]  [address] [place] [id number]  [phone]  ----------------------------------------------------------------------------------------------------------------------    History of Present Illness/Interval History:  Sruthi Mac is a 34 year old woman with history of depression and anxiety who presetned to  [facility] in April 2021 with acute onset of left leg pain. She was found to have left popliteal arterial occlusion that required extensive interventions. She was evaluated in June 2021 by my former collegue Dr. David due to thrombocytosis. Evaluation included MPN panel that revealed a [id number] mutation. Bone marrow biopsy was completed. She established care in Dr. Irving's clinic in late 2021. Please refer to her initial note for further details.       Doing well today. She will be at week 13 of her pregnancy tomorrow.  Had some constipation and nausea throughout 1st trimester which is starting to improve some.  Feeling more fatigued.  No additional concerns. No headache, congestion, sore throat, cough, chest pain, dyspnea, n/v/d/c, abdominal pain, limb pain or joint pain.       Past Medical History:  Past Medical History:   Diagnosis Date     Anxiety      Depression      Essential thrombocythemia (H)      History of arterial thrombosis      Hyperlipidemia      JAK2 gene mutation      Obesity      Panic attack      Subclinical hypothyroidism        Past Surgical History:  Past Surgical History:   Procedure Laterality Date     EMBOLECTOMY LOWER EXTREMITY Left 4/11/2021    Procedure: LEFT LEG ANTERIOR TIBIAL AND POSTERIOR TIBIAL EMBOLECTOMIES, FEMORAL POPITEAL VEIN PATCH, ANGIOGRAM, FASCIOTOMIES;  Surgeon: Ulises Gonzalez;  Location:  OR     IR ANGIOGRAM THROUGH CATHETER FOLLOW UP  4/9/2021     IR  ANGIOGRAM THROUGH CATHETER FOLLOW UP  4/10/2021     IR LOWER EXTREMITY ANGIOGRAM LEFT  4/8/2021     THROMBECTOMY LOWER EXTREMITY Left 4/12/2021    Procedure: Re-Exploration Left Lower Extremity With Popliteal and Tibial Embolectomy.;  Surgeon: Ulises Gonzalez;  Location: [facility] OR     wisdom teeth remova         Medications:  Current Outpatient Medications   Medication Sig Dispense Refill     busPIRone (BUSPAR) 10 MG tablet Take 10 mg by mouth 2 times daily       enoxaparin ANTICOAGULANT (LOVENOX) 100 MG/ML syringe Inject 1 mL (100 mg) Subcutaneous 2 times daily 60 mL 11     Ferrous Sulfate (IRON) 325 (65 Fe) MG tablet Take 1 tablet by mouth daily       Lactobacillus (PROBIOTIC ACIDOPHILUS PO) Take by mouth daily       levothyroxine (SYNTHROID/LEVOTHROID) 50 MCG tablet        peginterferon fouzia-2a (PEGASYS) 180 MCG/ML injection Inject 0.25 mLs (45 mcg) Subcutaneous every 7 days 4 mL 3     Prenatal MV-Min-Fe Fum-FA-DHA (PRENATAL 1 PO) Take 1 tablet by mouth       sertraline (ZOLOFT) 100 MG tablet Take 100 mg by mouth       Vitamin D3 (CHOLECALCIFEROL) 25 mcg (1000 units) tablet Take 1 tablet by mouth daily       letrozole (FEMARA) 2.5 MG tablet Take 2.5 mg by mouth (Patient not taking: Reported on 4/26/2023)       progesterone (PROMETRIUM) 200 MG capsule        STATIN NOT PRESCRIBED (INTENTIONAL) Please choose reason not prescribed from choices below.          Allergies:  Allergies   Allergen Reactions     Erythromycin Unknown     Pcn [Penicillins] Unknown       ROS:  A 10 point ROS is negative except as stated in the HPI    Social History:  Denies any tobacco use. No significant alcohol use. Denies any illicit drug use. Patient works as a  for [employer]. .    Family History:  Dad- T2 DM, hyperlipidemia, testicular cancer, melanoma  Mother- hyperlipidemia     Objective:  General: Well appearing.  HEENT: Sclerae anicteric.  Lungs: Breathing comfortably. No cough.  MSK: Grossly  normal movement. No tenderness to palpation in the lower extremities. No homans sign.   Neuro: Grossly non-focal. Decreased range of motion with flexion and extension of the L ankle.   Skin/access: Normal skin tone. No visible lesions, petechiae   Psych: Alert and oriented. No distress.    Lab Results  Results for orders placed or performed in visit on 04/26/23   CBC with platelets and differential     Status: Abnormal   Result Value Ref Range    WBC Count 9.9 4.0 - 11.0 10e3/uL    RBC Count 3.98 3.80 - 5.20 10e6/uL    Hemoglobin 11.5 (L) 11.7 - 15.7 g/dL    Hematocrit 33.5 (L) 35.0 - 47.0 %    MCV 84 78 - 100 fL    MCH 28.9 26.5 - 33.0 pg    MCHC 34.3 31.5 - 36.5 g/dL    RDW 14.1 10.0 - 15.0 %    Platelet Count 329 150 - 450 10e3/uL    % Neutrophils 72 %    % Lymphocytes 19 %    % Monocytes 6 %    % Eosinophils 1 %    % Basophils 1 %    % Immature Granulocytes 1 %    NRBCs per 100 WBC 0 <1 /100    Absolute Neutrophils 7.3 1.6 - 8.3 10e3/uL    Absolute Lymphocytes 1.8 0.8 - 5.3 10e3/uL    Absolute Monocytes 0.6 0.0 - 1.3 10e3/uL    Absolute Eosinophils 0.1 0.0 - 0.7 10e3/uL    Absolute Basophils 0.1 0.0 - 0.2 10e3/uL    Absolute Immature Granulocytes 0.1 <=0.4 10e3/uL    Absolute NRBCs 0.0 10e3/uL   CBC with Platelets & Differential     Status: Abnormal    Narrative    The following orders were created for panel order CBC with Platelets & Differential.  Procedure                               Abnormality         Status                     ---------                               -----------         ------                     CBC with platelets and d...[[id number]]  Abnormal            Final result                 Please view results for these tests on the individual orders.     Notable other labs  Negative evaluation for lupus anticoagulant, beta-2 glycoprotein    MTHFR- no mutation    Bone marrow: 4/14/21  The overall morphology of megakaryocytes is unremarkable with only rare forms showing complex or hyperchromatic  "nuclear features.  The review of available electronic medical record shows that the patient has a persistent thrombocytosis since at least April 2021, history multiple arterial thromboses and the molecular study showed presence of JAK2 V617F pathogenic mutation.  The molecular findings are consistent with clonal myeloid neoplasm with a differential diagnosis including essential thrombocytosis and primary myelofibrosis.  There is no morphological evidence for primary myelofibrosis however, the morphologic findings are not \"classic\" for ET in this suboptimal core biopsy.     Variant allele frequency: VAF: 0.0506 Variant Read Depth: 158 Total Read Depth: 3126     Imaging:  No recent relevant imaging  "

## 2023-09-12 ENCOUNTER — TELEPHONE (OUTPATIENT)
Dept: FAMILY MEDICINE CLINIC | Facility: CLINIC | Age: 73
End: 2023-09-12

## 2023-09-13 ENCOUNTER — TELEPHONE (OUTPATIENT)
Dept: FAMILY MEDICINE CLINIC | Facility: CLINIC | Age: 73
End: 2023-09-13

## 2023-09-13 DIAGNOSIS — E11.51 DM (DIABETES MELLITUS) TYPE II, CONTROLLED, WITH PERIPHERAL VASCULAR DISORDER (HCC): Primary | ICD-10-CM

## 2023-09-19 ENCOUNTER — TELEPHONE (OUTPATIENT)
Dept: FAMILY MEDICINE CLINIC | Facility: CLINIC | Age: 73
End: 2023-09-19

## 2023-09-19 ENCOUNTER — OFFICE VISIT (OUTPATIENT)
Dept: FAMILY MEDICINE CLINIC | Facility: CLINIC | Age: 73
End: 2023-09-19
Payer: MEDICARE

## 2023-09-19 VITALS
RESPIRATION RATE: 20 BRPM | DIASTOLIC BLOOD PRESSURE: 70 MMHG | HEART RATE: 68 BPM | BODY MASS INDEX: 33.77 KG/M2 | HEIGHT: 73 IN | TEMPERATURE: 97 F | WEIGHT: 254.81 LBS | OXYGEN SATURATION: 97 % | SYSTOLIC BLOOD PRESSURE: 136 MMHG

## 2023-09-19 DIAGNOSIS — I10 ESSENTIAL HYPERTENSION: ICD-10-CM

## 2023-09-19 DIAGNOSIS — I25.118 CORONARY ARTERY DISEASE OF NATIVE ARTERY OF NATIVE HEART WITH STABLE ANGINA PECTORIS (HCC): ICD-10-CM

## 2023-09-19 DIAGNOSIS — I73.9 PAD (PERIPHERAL ARTERY DISEASE) (HCC): ICD-10-CM

## 2023-09-19 DIAGNOSIS — Z12.5 SCREENING FOR MALIGNANT NEOPLASM OF PROSTATE: ICD-10-CM

## 2023-09-19 DIAGNOSIS — E11.51 DM (DIABETES MELLITUS) TYPE II, CONTROLLED, WITH PERIPHERAL VASCULAR DISORDER (HCC): Primary | ICD-10-CM

## 2023-09-19 DIAGNOSIS — M72.2 PLANTAR FASCIITIS: ICD-10-CM

## 2023-09-19 DIAGNOSIS — M15.9 PRIMARY OSTEOARTHRITIS INVOLVING MULTIPLE JOINTS: ICD-10-CM

## 2023-09-19 DIAGNOSIS — K76.0 FATTY LIVER: ICD-10-CM

## 2023-09-19 DIAGNOSIS — Z23 NEED FOR VACCINATION: ICD-10-CM

## 2023-09-19 DIAGNOSIS — K22.70 BARRETT'S ESOPHAGUS DETERMINED BY ENDOSCOPY: ICD-10-CM

## 2023-09-19 DIAGNOSIS — E78.2 HYPERLIPIDEMIA, MIXED: ICD-10-CM

## 2023-09-19 DIAGNOSIS — G47.33 OSA ON CPAP: ICD-10-CM

## 2023-09-19 PROCEDURE — 99214 OFFICE O/P EST MOD 30 MIN: CPT | Performed by: FAMILY MEDICINE

## 2023-09-19 PROCEDURE — 3078F DIAST BP <80 MM HG: CPT | Performed by: FAMILY MEDICINE

## 2023-09-19 PROCEDURE — G0008 ADMIN INFLUENZA VIRUS VAC: HCPCS | Performed by: FAMILY MEDICINE

## 2023-09-19 PROCEDURE — 1170F FXNL STATUS ASSESSED: CPT | Performed by: FAMILY MEDICINE

## 2023-09-19 PROCEDURE — 1125F AMNT PAIN NOTED PAIN PRSNT: CPT | Performed by: FAMILY MEDICINE

## 2023-09-19 PROCEDURE — G0010 ADMIN HEPATITIS B VACCINE: HCPCS | Performed by: FAMILY MEDICINE

## 2023-09-19 PROCEDURE — 3008F BODY MASS INDEX DOCD: CPT | Performed by: FAMILY MEDICINE

## 2023-09-19 PROCEDURE — 90662 IIV NO PRSV INCREASED AG IM: CPT | Performed by: FAMILY MEDICINE

## 2023-09-19 PROCEDURE — 1160F RVW MEDS BY RX/DR IN RCRD: CPT | Performed by: FAMILY MEDICINE

## 2023-09-19 PROCEDURE — 90746 HEPB VACCINE 3 DOSE ADULT IM: CPT | Performed by: FAMILY MEDICINE

## 2023-09-19 PROCEDURE — 3075F SYST BP GE 130 - 139MM HG: CPT | Performed by: FAMILY MEDICINE

## 2023-09-19 PROCEDURE — 3072F LOW RISK FOR RETINOPATHY: CPT | Performed by: FAMILY MEDICINE

## 2023-09-19 PROCEDURE — 1159F MED LIST DOCD IN RCRD: CPT | Performed by: FAMILY MEDICINE

## 2023-09-19 RX ORDER — SENNOSIDES 8.6 MG
1300 CAPSULE ORAL EVERY 8 HOURS PRN
Qty: 180 TABLET | Refills: 1 | Status: SHIPPED | OUTPATIENT
Start: 2023-09-19

## 2023-09-19 NOTE — TELEPHONE ENCOUNTER
Connected office of Awa Dunn MD looking to get patient Diabatic eye exam.    LMOM to return call to the office. Provided pt office phone [phone] along with office hours.

## 2023-09-21 LAB
ALBUMIN/GLOBULIN RATIO: 1.8 (CALC) (ref 1–2.5)
ALBUMIN: 4.1 G/DL (ref 3.6–5.1)
ALKALINE PHOSPHATASE: 56 U/L (ref 35–144)
ALT: 24 U/L (ref 9–46)
AST: 22 U/L (ref 10–35)
BILIRUBIN, TOTAL: 0.6 MG/DL (ref 0.2–1.2)
BUN: 16 MG/DL (ref 7–25)
CALCIUM: 9.5 MG/DL (ref 8.6–10.3)
CARBON DIOXIDE: 27 MMOL/L (ref 20–32)
CHLORIDE: 106 MMOL/L (ref 98–110)
CHOL/HDLC RATIO: 2.9 (CALC)
CHOLESTEROL, TOTAL: 141 MG/DL
CREATININE, RANDOM URINE: 170 MG/DL (ref 20–320)
CREATININE: 1 MG/DL (ref 0.7–1.28)
EGFR: 79 ML/MIN/1.73M2
GLOBULIN: 2.3 G/DL (CALC) (ref 1.9–3.7)
GLUCOSE: 101 MG/DL (ref 65–99)
HDL CHOLESTEROL: 49 MG/DL
HEMOGLOBIN A1C: 5.6 % OF TOTAL HGB
LDL-CHOLESTEROL: 71 MG/DL (CALC)
MICROALBUMIN/CREATININE RATIO, RANDOM URINE: 3 MCG/MG CREAT
MICROALBUMIN: 0.5 MG/DL
NON-HDL CHOLESTEROL: 92 MG/DL (CALC)
POTASSIUM: 4.5 MMOL/L (ref 3.5–5.3)
PROTEIN, TOTAL: 6.4 G/DL (ref 6.1–8.1)
PSA, TOTAL: 1.25 NG/ML
SODIUM: 141 MMOL/L (ref 135–146)
TRIGLYCERIDES: 120 MG/DL

## 2024-01-04 ENCOUNTER — HOSPITAL ENCOUNTER (OUTPATIENT)
Age: 74
Discharge: HOME | End: 2024-01-04
Payer: MEDICARE

## 2024-01-04 ENCOUNTER — TELEPHONE (OUTPATIENT)
Dept: FAMILY MEDICINE CLINIC | Facility: CLINIC | Age: 74
End: 2024-01-04

## 2024-01-04 VITALS
WEIGHT: 253 LBS | HEIGHT: 73 IN | OXYGEN SATURATION: 96 % | RESPIRATION RATE: 16 BRPM | TEMPERATURE: 97 F | SYSTOLIC BLOOD PRESSURE: 122 MMHG | BODY MASS INDEX: 33.53 KG/M2 | HEART RATE: 79 BPM | DIASTOLIC BLOOD PRESSURE: 62 MMHG

## 2024-01-04 DIAGNOSIS — J10.1 INFLUENZA A: Primary | ICD-10-CM

## 2024-01-04 DIAGNOSIS — J02.9 SORE THROAT: ICD-10-CM

## 2024-01-04 LAB
POCT INFLUENZA A: POSITIVE
POCT INFLUENZA B: NEGATIVE
SARS-COV-2 RNA RESP QL NAA+PROBE: NOT DETECTED

## 2024-01-04 PROCEDURE — 87502 INFLUENZA DNA AMP PROBE: CPT | Performed by: NURSE PRACTITIONER

## 2024-01-04 PROCEDURE — 99203 OFFICE O/P NEW LOW 30 MIN: CPT | Performed by: NURSE PRACTITIONER

## 2024-01-04 PROCEDURE — U0002 COVID-19 LAB TEST NON-CDC: HCPCS | Performed by: NURSE PRACTITIONER

## 2024-01-04 NOTE — ED PROVIDER NOTES
Patient Seen in: [facility]      History     Chief Complaint   Patient presents with    Cough/URI     Stated Complaint: cough    Subjective:   73-year-old male presents today with complaints of congestion runny nose sore throat and cough over the last 2 to 3 days.  Denies any nausea vomiting diarrhea.  Has had chills, no body aches joint pain but has had generalized fatigue.  Patient is alert oriented x 3.  Denies any recent known exposure to illness.  The patient's medication list, past medical history and social history elements as listed in today's nurse's notes were reviewed and agreed (except as otherwise stated in the HPI).  The patient's family history reviewed and determined to be noncontributory to the presenting problem            Objective:   Past Medical History:   Diagnosis Date    AAA (abdominal aortic aneurysm) ([facility]) 12/30/2014    Arthritis     Back pain     Bilateral hip pain 09/25/2019    Bleeding nose     BPH (benign prostatic hyperplasia)     Calculus of kidney     Carotid stenosis 07/14/2014    mild    Cataract     Cataract, left     already had R eye update 2016    Cervical disc herniation 05/23/2016    Change in hair 1979    Started going grey.    Chest pain on exertion 01/01/2018    Drs could not get stint put in    Coronary atherosclerosis     COVID-19 virus detected 01/16/2021    patient has loss of taste and smell    Diastasis recti 12/30/2014    ventral    Disorder of prostate     Diverticulosis 12/30/2014    Easy bruising     Erythrocytosis 02/09/2017    Frequent urination     Hearing impairment     bilateral hearing aids    Hearing loss     Heart attack (HCC) 2003    Heel spur, left 12/14/2022    Hemorrhoids     High blood pressure     High cholesterol     History of kidney stones 12/30/2014    Obesity (BMI 30-39.9) 05/23/2016    Obstructive sleep apnea (adult) (pediatric) SPLIT NIGHT 5-4-17    AHI 35.8 autoPAP 10-20 HME    Osteoarthrosis, unspecified whether generalized or  localized, unspecified site     Pain in joints     Peripheral vascular disease (HCC)     Personal history of other diseases of circulatory system     CAD    Plantar fasciitis 12/14/2022    Polycythemia 02/09/2017    History of polycythemia due to obstructive sleep apnea and followed previously by Dr. Celaya.  Denies headache or abdominal pain.  Denies bruising.  Last CBC 1/15/2019 is normal.  Recommend annual CBC by hematology.    PVD (peripheral vascular disease) (HCC) 07/14/2014    On screening 2014 legs erum    Shortness of breath     with exertion    Sleep apnea     Stented coronary artery     Not in heart, off of aorta to both legs    Trochanteric bursitis of right hip 03/19/2016    Type II or unspecified type diabetes mellitus without mention of complication, not stated as uncontrolled               Past Surgical History:   Procedure Laterality Date    ANGIOGRAM      stents in aorta    ANGIOPLASTY (CORONARY)      CABG  2003    X 4 vessels    CATARACT  2018    Right eye-Dr. Oppenheim    CATARACT      CATH BARE METAL STENT (BMS)      CHOLECYSTECTOMY  2003    COLONOSCOPY  2003    COLONOSCOPY N/A 01/30/2015    Procedure: COLONOSCOPY;  Surgeon: Bayron Barlow MD;  Location:  ENDOSCOPY    COLONOSCOPY N/A 01/17/2020    Procedure: COLONOSCOPY;  Surgeon: Alberto Stokes DO;  Location:  ENDOSCOPY    COLONOSCOPY N/A 03/03/2021    Procedure: COLONOSCOPY;  Surgeon: Alberto Stokes DO;  Location:  ENDOSCOPY    COLONOSCOPY N/A 01/14/2022    Procedure: COLONOSCOPY with colds snare polypectomy;  Surgeon: Alberto Stokes DO;  Location:  ENDOSCOPY    CYSTOSCOPY,INSERT URETERAL STENT  12/2014    OTHER SURGICAL HISTORY  2014    passed kidney stone via stent    TONSILLECTOMY                  No pertinent social history.            Review of Systems    Positive for stated complaint: cough  Other systems are as noted in HPI.  Constitutional and vital signs reviewed.      All other systems reviewed and negative  except as noted above.    Physical Exam     ED Triage Vitals [01/04/24 1224]   /62   Pulse 79   Resp 16   Temp 97 °F (36.1 °C)   Temp src Temporal   SpO2 96 %   O2 Device None (Room air)       Current:/62   Pulse 79   Temp 97 °F (36.1 °C) (Temporal)   Resp 16   Ht 185.4 cm (6' 1\")   Wt 114.8 kg   SpO2 96%   BMI 33.38 kg/m²         Physical Exam  Vitals and nursing note reviewed.   Constitutional:       Appearance: He is well-developed.   HENT:      Head: Normocephalic.      Right Ear: Tympanic membrane and ear canal normal.      Left Ear: Tympanic membrane and ear canal normal.      Nose: Mucosal edema and congestion present.      Mouth/Throat:      Pharynx: Uvula midline. Posterior oropharyngeal erythema present.   Eyes:      Conjunctiva/sclera: Conjunctivae normal.      Pupils: Pupils are equal, round, and reactive to light.   Cardiovascular:      Rate and Rhythm: Normal rate and regular rhythm.   Pulmonary:      Effort: Pulmonary effort is normal.      Breath sounds: Normal breath sounds.   Musculoskeletal:      Cervical back: Normal range of motion and neck supple.   Lymphadenopathy:      Cervical: No cervical adenopathy.   Skin:     General: Skin is warm and dry.   Neurological:      Mental Status: He is alert and oriented to person, place, and time.               ED Course     Labs Reviewed   POCT FLU TEST - Abnormal; Notable for the following components:       Result Value    POCT INFLUENZA A Positive (*)     All other components within normal limits    Narrative:     This assay is a rapid molecular in vitro test utilizing nucleic acid amplification of influenza A and B viral RNA.   RAPID SARS-COV-2 BY PCR - Normal                      MDM     Please note that this report has been produced using speech recognition software and may contain errors related to that system including, but not limited to, errors in grammar, punctuation, and spelling, as well as words and phrases that possibly may  have been recognized inappropriately.  If there are any questions or concerns, contact the dictating provider for clarification.        Note to patient: The 21st Century Cures Act makes medical notes like these available to patients in the interest of transparency. However, this is a medical document intended as peer to peer communication. It is written in medical language and may contain abbreviations or verbiage that are unfamiliar. It may appear blunt or direct. Medical documents are intended to carry relevant information, facts as evident, and the clinical opinion of the practitioner.                                   Medical Decision Making  Differential diagnosis includes but is not limited to: COVID-19, viral URI, strep throat, influenza, pneumonia, sinusitis, bronchitis      Is in today with flulike symptoms.  Rapid COVID-19 testing was done and negative.  Rapid flu however is positive for influenza A.  Patient outside the window to receive Tamiflu.  Encouraged to Push fluids and rest, alternate Tylenol and Motrin for any fever pain.  Take over-the-counter antihistamine and cough suppressant as needed.  Use a cool-mist humidifier at the bedside.  To follow primary care physician in 1 week if symptoms do not improve.  Take over-the-counter zinc and vitamin C to boost your immune system.  Patient verbalized understanding agree with plan of care.        Amount and/or Complexity of Data Reviewed  Labs: ordered.     Details: Rapid flu-positive influenza A  Rapid 19-negative    Risk  OTC drugs.        Disposition and Plan     Clinical Impression:  1. Influenza A    2. Sore throat         Disposition:  Discharge  1/4/2024  1:00 pm    Follow-up:  Caitlin Samayoa DO  [address] [facility] [id number] [phone]    In 1 week  As needed          Medications Prescribed:  Current Discharge Medication List

## 2024-01-04 NOTE — TELEPHONE ENCOUNTER
Pt called, states he thought he had a video visit with PCP today, but noticed it was for 1/11/2024. Reports sore throat, coughing, runny nose, clear phlegm x 2 days. Tested negative for COVID and denies fever. Reports he has been taking Mucinex tablets, but they are not really helping a whole lot. Pt wants antibiotics.    Since no appt with PCP, offered appt with Dr. Bowie tomorrow. Pt declined. States he will ho to  to get evaluated.    Routed to provider for review.

## 2024-01-04 NOTE — ED INITIAL ASSESSMENT (HPI)
Pt sts sore throat, runny nose, productive cough for the past 2 days. Denies fever.  Negative at home covid test last night.

## 2024-01-04 NOTE — TELEPHONE ENCOUNTER
Agree with advice given.  Patient needs to be seen for any antibiotics to be prescribed.  Declined office visit tomorrow.

## 2024-01-08 ENCOUNTER — TELEMEDICINE (OUTPATIENT)
Dept: FAMILY MEDICINE CLINIC | Facility: CLINIC | Age: 74
End: 2024-01-08
Payer: MEDICARE

## 2024-01-08 DIAGNOSIS — J10.1 INFLUENZA A: Primary | ICD-10-CM

## 2024-01-08 RX ORDER — NITROGLYCERIN 0.4 MG/1
TABLET SUBLINGUAL
COMMUNITY
Start: 2023-10-06

## 2024-01-08 NOTE — PROGRESS NOTES
Due to COVID-19 ACTION PLAN, the patient's office visit was converted to a video visit.  Time Spent: 10 min    Chief Complaint   Patient presents with    Cough     Positive flu A   Negative Covid      Subjective     HPI:   Jatinder Ennis is a 73 year old male who presents for follow up influenza A diagnosed on 1/4/2024 with influenza A at [facility] care and told to treat with supportive OTC medications.  Symptoms started on 1/2/2024 with sore throat, runny nose and productive clear phlegm, body aches, chills, and fatigue. Denies fever. Today much better.  Has less cough and phlegm and nasal congestion.  No shortness of breath or chest tightness.  Energy level is increasing.  Never lost appetite.  States blood sugars are good fasting blood sugar this morning was 106.  Using NyQuil and Tylenol to treat his symptoms.  Patient denies any nausea, vomiting, diarrhea.         Chief Complaint Reviewed and Verified  Nursing Notes Reviewed and   Verified  Tobacco Reviewed  Allergies Reviewed  Medications Reviewed    Problem List Reviewed  Medical History Reviewed  Surgical History   Reviewed  Family History Reviewed                Current Outpatient Medications on File Prior to Visit   Medication Sig Dispense Refill    nitroglycerin 0.4 MG Sublingual SL Tab TAKE 1 TABLET SUBLINGUAL ONCE A DAY AS NEEDED FOR CHEST PAIN      Acetaminophen ER (TYLENOL 8 HOUR ARTHRITIS PAIN) 650 MG Oral Tab CR Take 2 tablets (1,300 mg total) by mouth every 8 (eight) hours as needed for Pain (Max is 6 tablets in 24 hours or 4000 mg of Tylenol/acetaminophen in 24 hours). 180 tablet 1    diclofenac 1 % External Gel Apply 2 g topically 4 (four) times daily. Buy otc 150 g 1    semaglutide 8 MG/3ML Subcutaneous Solution Pen-injector Inject 2 mg into the skin once a week. 9 mL 1    PANTOPRAZOLE 40 MG Oral Tab EC TAKE 1 TABLET ONE TIME DAILY 90 tablet 3    rosuvastatin 40 MG Oral Tab Take 1 tablet (40 mg total) by mouth nightly. 90 tablet 1     ezetimibe 10 MG Oral Tab Take 1 tablet (10 mg total) by mouth nightly. 90 tablet 1    lisinopril 5 MG Oral Tab Take 1 tablet (5 mg total) by mouth daily. 90 tablet 1    amLODIPine 5 MG Oral Tab Take 1 tablet (5 mg total) by mouth daily. 90 tablet 1    metoprolol succinate ER 50 MG Oral Tablet 24 Hr Take 1 tablet (50 mg total) by mouth daily. 90 tablet 1    sildenafil 20 MG Oral Tab TAKE 5 TABLETS BY MOUTH 1 HOUR BEFORE SEXUAL ACTIVITY. DO NOT EXCEED 5 TABLETS PER 24 HOURS      Vitamin D3, Cholecalciferol, 50 MCG (2000 UT) Oral Tab vitamin D3  2000 IUs take 1 tablet daily 365 tablet 0    tamsulosin (FLOMAX) cap Take 2 capsules (0.8 mg total) by mouth daily. 180 capsule 3    psyllium 28 % Oral Powd Pack Take 1 packet by mouth daily.      Clopidogrel Bisulfate 75 MG Oral Tab Take 1 tablet (75 mg total) by mouth daily. 30 tablet 0    acetaminophen (TYLENOL EXTRA STRENGTH) 500 MG Oral Tab Take 1 tablet (500 mg total) by mouth every 6 (six) hours as needed for Pain.      aspirin 81 MG Oral Tab EC Take 1 tablet (81 mg total) by mouth daily.       No current facility-administered medications on file prior to visit.           Physical Exam:  alert and cooperative, well-nourished/well-hydrated, no no pallor or tachypnea, appropriately groomed, speaking in full sentences comfortably and Normal work of breathing, answers questions appropriately      Admission on 01/04/2024, Discharged on 01/04/2024   Component Date Value    POCT INFLUENZA A 01/04/2024 Positive (A)     POCT INFLUENZA B 01/04/2024 Negative     Rapid SARS-CoV-2 by PCR 01/04/2024 Not Detected          Assessment    Diagnoses and all orders for this visit:    Influenza A  Outside the window for Tamiflu and symptoms are improving  Agrees to buy over-the-counter Mucinex DM 12-hour extended release  Declined prescription for Tessalon Perles  Continue increasing fluids and rest  Stop NyQuil since it can raise blood pressure and continue to monitor if feels  febrile.  May use Tylenol for body aches  Discussed at risk of secondary bacterial infections with influenza if develops sinus infection, ear infection, symptoms of pneumonia short miss of breath, chest pain, worsening cough or fever or other returning or worsening or new symptoms and needs OV ASAP-call office.  If severe then go to the ER.       Recommended OTC medications- Mucinex DM 12-hour extended release 1 tablet p.o. every 12 hours as needed for cough and congestion    Return in about 1 week (around 1/15/2024), or if symptoms worsen or fail to improve.    Caitlin Samayoa, DO Jatinder Ennis understands video or phone evaluation is not a substitute for face-to-face examination or emergency care. Patient advised to go to ER or call 911 for worsening symptoms or acute distress.     Patient/Caregiver Education: Patient/Caregiver Education: There are no barriers to learning. Medical education done.   Outcome: Patient verbalizes understanding. Patient is notified to call with any questions, complications, allergies, or worsening or changing symptoms.  Patient is to call with any side effects or complications from the treatments as a result of today.     Please note that the following visit was completed using two-way, real-time interactive audio and/or video communication.  This has been done in good tamar to provide continuity of care in the best interest of the provider-patient relationship, due to the on-going public health crisis/national emergency and because of restrictions of visitation.  There are limitations of this visit as no physical exam could be performed.  Every conscious effort was taken to allow for sufficient and adequate time.  This billing visit was spent on reviewing labs, medications, radiology tests and decision making.  Appropriate medical decision-making and tests are ordered as detailed in the plan of care above.

## 2024-01-26 DIAGNOSIS — I10 ESSENTIAL HYPERTENSION: ICD-10-CM

## 2024-01-26 DIAGNOSIS — I25.118 CORONARY ARTERY DISEASE OF NATIVE ARTERY OF NATIVE HEART WITH STABLE ANGINA PECTORIS (HCC): ICD-10-CM

## 2024-01-29 RX ORDER — METOPROLOL SUCCINATE 50 MG/1
50 TABLET, EXTENDED RELEASE ORAL DAILY
Qty: 90 TABLET | Refills: 3 | Status: SHIPPED | OUTPATIENT
Start: 2024-01-29

## 2024-01-29 NOTE — TELEPHONE ENCOUNTER
Refill Passed Protocol:     Pt requesting refill of   Requested Prescriptions     Pending Prescriptions Disp Refills    METOPROLOL SUCCINATE ER 50 MG Oral Tablet 24 Hr [Pharmacy Med Name: METOPROLOL SUCCINATE ER 50 MG Tablet Extended Release 24 Hour] 90 tablet 3     Sig: TAKE 1 TABLET EVERY DAY     Refill was approved and sent to pharmacy:     Last Time Medication was Filled:  3/21/2023    Last Office Visit with Provider: 1/8/2024 via telemedicine    Appt. scheduled on 3/26/2024

## 2024-02-21 ENCOUNTER — TELEPHONE (OUTPATIENT)
Dept: FAMILY MEDICINE CLINIC | Facility: CLINIC | Age: 74
End: 2024-02-21

## 2024-02-21 NOTE — TELEPHONE ENCOUNTER
Reached patient, introduced self and program. Patient declined needing my services and not interested in speaking about medications over the phone. Will close encounter.

## 2024-03-15 ENCOUNTER — LAB ENCOUNTER (OUTPATIENT)
Dept: LAB | Facility: HOSPITAL | Age: 74
End: 2024-03-15
Attending: FAMILY MEDICINE
Payer: MEDICARE

## 2024-03-15 ENCOUNTER — TELEPHONE (OUTPATIENT)
Dept: FAMILY MEDICINE CLINIC | Facility: CLINIC | Age: 74
End: 2024-03-15

## 2024-03-15 DIAGNOSIS — I70.202 ATHEROSCLEROSIS OF ARTERY OF LEFT LOWER EXTREMITY (HCC): ICD-10-CM

## 2024-03-15 DIAGNOSIS — Z95.1 S/P CABG (CORONARY ARTERY BYPASS GRAFT): ICD-10-CM

## 2024-03-15 DIAGNOSIS — I77.9 BILATERAL CAROTID ARTERY DISEASE, UNSPECIFIED TYPE (HCC): ICD-10-CM

## 2024-03-15 DIAGNOSIS — I73.9 PAD (PERIPHERAL ARTERY DISEASE) (HCC): ICD-10-CM

## 2024-03-15 DIAGNOSIS — I71.43 INFRARENAL ABDOMINAL AORTIC ANEURYSM (AAA) WITHOUT RUPTURE (HCC): ICD-10-CM

## 2024-03-15 DIAGNOSIS — Z95.828 STATUS POST INSERTION OF ILIAC ARTERY STENT: ICD-10-CM

## 2024-03-15 DIAGNOSIS — I25.118 CORONARY ARTERY DISEASE OF NATIVE ARTERY OF NATIVE HEART WITH STABLE ANGINA PECTORIS (HCC): ICD-10-CM

## 2024-03-15 DIAGNOSIS — I74.09 AORTOILIAC OCCLUSIVE DISEASE (HCC): Primary | ICD-10-CM

## 2024-03-15 LAB
ALBUMIN SERPL-MCNC: 3.8 G/DL (ref 3.4–5)
ALBUMIN/GLOB SERPL: 1.2 {RATIO} (ref 1–2)
ALP LIVER SERPL-CCNC: 61 U/L
ALT SERPL-CCNC: 30 U/L
ANION GAP SERPL CALC-SCNC: 4 MMOL/L (ref 0–18)
AST SERPL-CCNC: 23 U/L (ref 15–37)
BASOPHILS # BLD AUTO: 0.04 X10(3) UL (ref 0–0.2)
BASOPHILS NFR BLD AUTO: 0.6 %
BILIRUB SERPL-MCNC: 0.6 MG/DL (ref 0.1–2)
BUN BLD-MCNC: 16 MG/DL (ref 9–23)
CALCIUM BLD-MCNC: 9.3 MG/DL (ref 8.5–10.1)
CHLORIDE SERPL-SCNC: 111 MMOL/L (ref 98–112)
CHOLEST SERPL-MCNC: 129 MG/DL (ref ?–200)
CO2 SERPL-SCNC: 24 MMOL/L (ref 21–32)
CREAT BLD-MCNC: 1.06 MG/DL
CREAT UR-SCNC: 96.6 MG/DL
EGFRCR SERPLBLD CKD-EPI 2021: 74 ML/MIN/1.73M2 (ref 60–?)
EOSINOPHIL # BLD AUTO: 0.17 X10(3) UL (ref 0–0.7)
EOSINOPHIL NFR BLD AUTO: 2.8 %
ERYTHROCYTE [DISTWIDTH] IN BLOOD BY AUTOMATED COUNT: 12.5 %
EST. AVERAGE GLUCOSE BLD GHB EST-MCNC: 134 MG/DL (ref 68–126)
FASTING PATIENT LIPID ANSWER: YES
FASTING STATUS PATIENT QL REPORTED: YES
GLOBULIN PLAS-MCNC: 3.3 G/DL (ref 2.8–4.4)
GLUCOSE BLD-MCNC: 124 MG/DL (ref 70–99)
HBA1C MFR BLD: 6.3 % (ref ?–5.7)
HCT VFR BLD AUTO: 47.2 %
HDLC SERPL-MCNC: 50 MG/DL (ref 40–59)
HGB BLD-MCNC: 16 G/DL
IMM GRANULOCYTES # BLD AUTO: 0.01 X10(3) UL (ref 0–1)
IMM GRANULOCYTES NFR BLD: 0.2 %
LDLC SERPL CALC-MCNC: 58 MG/DL (ref ?–100)
LYMPHOCYTES # BLD AUTO: 2.19 X10(3) UL (ref 1–4)
LYMPHOCYTES NFR BLD AUTO: 35.4 %
MCH RBC QN AUTO: 30.3 PG (ref 26–34)
MCHC RBC AUTO-ENTMCNC: 33.9 G/DL (ref 31–37)
MCV RBC AUTO: 89.4 FL
MICROALBUMIN UR-MCNC: 1.08 MG/DL
MICROALBUMIN/CREAT 24H UR-RTO: 11.2 UG/MG (ref ?–30)
MONOCYTES # BLD AUTO: 0.61 X10(3) UL (ref 0.1–1)
MONOCYTES NFR BLD AUTO: 9.9 %
NEUTROPHILS # BLD AUTO: 3.16 X10 (3) UL (ref 1.5–7.7)
NEUTROPHILS # BLD AUTO: 3.16 X10(3) UL (ref 1.5–7.7)
NEUTROPHILS NFR BLD AUTO: 51.1 %
NONHDLC SERPL-MCNC: 79 MG/DL (ref ?–130)
OSMOLALITY SERPL CALC.SUM OF ELEC: 291 MOSM/KG (ref 275–295)
PLATELET # BLD AUTO: 226 10(3)UL (ref 150–450)
POTASSIUM SERPL-SCNC: 4.5 MMOL/L (ref 3.5–5.1)
PROT SERPL-MCNC: 7.1 G/DL (ref 6.4–8.2)
RBC # BLD AUTO: 5.28 X10(6)UL
SODIUM SERPL-SCNC: 139 MMOL/L (ref 136–145)
TRIGL SERPL-MCNC: 117 MG/DL (ref 30–149)
TSI SER-ACNC: 2.61 MIU/ML (ref 0.36–3.74)
VLDLC SERPL CALC-MCNC: 17 MG/DL (ref 0–30)
WBC # BLD AUTO: 6.2 X10(3) UL (ref 4–11)

## 2024-03-15 PROCEDURE — 85025 COMPLETE CBC W/AUTO DIFF WBC: CPT | Performed by: FAMILY MEDICINE

## 2024-03-15 PROCEDURE — 80061 LIPID PANEL: CPT | Performed by: FAMILY MEDICINE

## 2024-03-15 PROCEDURE — 83036 HEMOGLOBIN GLYCOSYLATED A1C: CPT | Performed by: FAMILY MEDICINE

## 2024-03-15 PROCEDURE — 36415 COLL VENOUS BLD VENIPUNCTURE: CPT | Performed by: FAMILY MEDICINE

## 2024-03-15 PROCEDURE — 84443 ASSAY THYROID STIM HORMONE: CPT | Performed by: FAMILY MEDICINE

## 2024-03-15 PROCEDURE — 82570 ASSAY OF URINE CREATININE: CPT | Performed by: FAMILY MEDICINE

## 2024-03-15 PROCEDURE — 82043 UR ALBUMIN QUANTITATIVE: CPT | Performed by: FAMILY MEDICINE

## 2024-03-15 PROCEDURE — 80053 COMPREHEN METABOLIC PANEL: CPT | Performed by: FAMILY MEDICINE

## 2024-03-15 NOTE — TELEPHONE ENCOUNTER
Patient calling for test results and also asking if they can be sent to Dr Matt Stewart his Cardiologist

## 2024-03-15 NOTE — TELEPHONE ENCOUNTER
Lab results faxed to Dr. Stewart's office at [phone].    LMOM to return call to the office. Provided pt office phone [phone] along with office hours.

## 2024-03-19 NOTE — TELEPHONE ENCOUNTER
Pt called, states he needs a referral for cardiology faxed to Dr. Gutierrez's office. Reports he has appt today.    Referral pended for provider review and signature.

## 2024-03-26 ENCOUNTER — OFFICE VISIT (OUTPATIENT)
Dept: FAMILY MEDICINE CLINIC | Facility: CLINIC | Age: 74
End: 2024-03-26
Payer: MEDICARE

## 2024-03-26 VITALS
RESPIRATION RATE: 18 BRPM | HEIGHT: 73 IN | WEIGHT: 266 LBS | BODY MASS INDEX: 35.25 KG/M2 | TEMPERATURE: 98 F | DIASTOLIC BLOOD PRESSURE: 60 MMHG | OXYGEN SATURATION: 94 % | HEART RATE: 54 BPM | SYSTOLIC BLOOD PRESSURE: 132 MMHG

## 2024-03-26 DIAGNOSIS — E78.2 HYPERLIPIDEMIA, MIXED: ICD-10-CM

## 2024-03-26 DIAGNOSIS — I25.118 CORONARY ARTERY DISEASE OF NATIVE ARTERY OF NATIVE HEART WITH STABLE ANGINA PECTORIS (HCC): ICD-10-CM

## 2024-03-26 DIAGNOSIS — E11.51 DM (DIABETES MELLITUS) TYPE II, CONTROLLED, WITH PERIPHERAL VASCULAR DISORDER (HCC): ICD-10-CM

## 2024-03-26 DIAGNOSIS — K76.0 FATTY LIVER: ICD-10-CM

## 2024-03-26 DIAGNOSIS — K22.70 BARRETT'S ESOPHAGUS DETERMINED BY ENDOSCOPY: ICD-10-CM

## 2024-03-26 DIAGNOSIS — D12.6 TUBULAR ADENOMA OF COLON: ICD-10-CM

## 2024-03-26 DIAGNOSIS — N40.0 BENIGN PROSTATIC HYPERPLASIA WITHOUT LOWER URINARY TRACT SYMPTOMS: ICD-10-CM

## 2024-03-26 DIAGNOSIS — I70.203 BILATERAL ATHEROSCLEROSIS OF LEGS (HCC): ICD-10-CM

## 2024-03-26 DIAGNOSIS — H90.3 SENSORINEURAL HEARING LOSS (SNHL) OF BOTH EARS: ICD-10-CM

## 2024-03-26 DIAGNOSIS — I74.09 AORTOILIAC OCCLUSIVE DISEASE (HCC): ICD-10-CM

## 2024-03-26 DIAGNOSIS — R31.21 ASYMPTOMATIC MICROSCOPIC HEMATURIA: ICD-10-CM

## 2024-03-26 DIAGNOSIS — I71.43 INFRARENAL ABDOMINAL AORTIC ANEURYSM (AAA) WITHOUT RUPTURE (HCC): ICD-10-CM

## 2024-03-26 DIAGNOSIS — I10 ESSENTIAL HYPERTENSION: ICD-10-CM

## 2024-03-26 DIAGNOSIS — G47.33 OSA ON CPAP: ICD-10-CM

## 2024-03-26 DIAGNOSIS — I73.9 PAD (PERIPHERAL ARTERY DISEASE) (HCC): ICD-10-CM

## 2024-03-26 DIAGNOSIS — Z00.00 ENCOUNTER FOR ANNUAL HEALTH EXAMINATION: Primary | ICD-10-CM

## 2024-03-26 DIAGNOSIS — Z95.828 STATUS POST INSERTION OF ILIAC ARTERY STENT: ICD-10-CM

## 2024-03-26 DIAGNOSIS — Z95.1 S/P CABG (CORONARY ARTERY BYPASS GRAFT): ICD-10-CM

## 2024-03-26 DIAGNOSIS — I77.9 BILATERAL CAROTID ARTERY DISEASE, UNSPECIFIED TYPE (HCC): ICD-10-CM

## 2024-03-26 RX ORDER — LISINOPRIL 10 MG/1
10 TABLET ORAL DAILY
Qty: 90 TABLET | Refills: 1 | Status: SHIPPED | OUTPATIENT
Start: 2024-03-26

## 2024-03-26 RX ORDER — EZETIMIBE 10 MG/1
10 TABLET ORAL NIGHTLY
Qty: 90 TABLET | Refills: 1 | Status: SHIPPED | OUTPATIENT
Start: 2024-03-26

## 2024-03-26 RX ORDER — AMLODIPINE BESYLATE 5 MG/1
5 TABLET ORAL DAILY
Qty: 90 TABLET | Refills: 1 | Status: SHIPPED | OUTPATIENT
Start: 2024-03-26

## 2024-03-26 RX ORDER — METOPROLOL SUCCINATE 50 MG/1
50 TABLET, EXTENDED RELEASE ORAL DAILY
Qty: 90 TABLET | Refills: 3 | Status: SHIPPED | OUTPATIENT
Start: 2024-03-26

## 2024-03-26 RX ORDER — TAMSULOSIN HYDROCHLORIDE 0.4 MG/1
0.8 CAPSULE ORAL DAILY
Qty: 180 CAPSULE | Refills: 0 | Status: SHIPPED | OUTPATIENT
Start: 2024-03-26

## 2024-03-26 RX ORDER — ROSUVASTATIN CALCIUM 40 MG/1
40 TABLET, COATED ORAL NIGHTLY
Qty: 90 TABLET | Refills: 1 | Status: SHIPPED | OUTPATIENT
Start: 2024-03-26

## 2024-03-26 NOTE — PATIENT INSTRUCTIONS
Jatinder Ennis's SCREENING SCHEDULE   Tests on this list are recommended by your physician but may not be covered, or covered at this frequency, by your insurer.   Please check with your insurance carrier before scheduling to verify coverage.   PREVENTATIVE SERVICES FREQUENCY &  COVERAGE DETAILS LAST COMPLETION DATE   Diabetes Screening    Fasting Blood Sugar / Glucose    One screening every 12 months if never tested or if previously tested but not diagnosed with pre-diabetes   One screening every 6 months if diagnosed with pre-diabetes Lab Results   Component Value Date     (H) 03/15/2024        Cardiovascular Disease Screening    Lipid Panel  Cholesterol  Lipoprotein (HDL)  Triglycerides Covered every 5 years for all Medicare beneficiaries without apparent signs or symptoms of cardiovascular disease Lab Results   Component Value Date    CHOLEST 129 03/15/2024    HDL 50 03/15/2024    LDL 58 03/15/2024    TRIG 117 03/15/2024         Electrocardiogram (EKG)   Covered if needed at Welcome to Medicare, and non-screening if indicated for medical reasons 07/16/2020      Ultrasound Screening for Abdominal Aortic Aneurysm (AAA) Covered once in a lifetime for one of the following risk factors   • Men who are 65-75 years old and have ever smoked   • Anyone with a family history -     Colorectal Cancer Screening  Covered for ages 50-85; only need ONE of the following:    Colonoscopy   Covered every 10 years    Covered every 2 years if patient is at high risk or previous colonoscopy was abnormal 01/14/2022    Health Maintenance   Topic Date Due   • Colorectal Cancer Screening  01/14/2025       Flexible Sigmoidoscopy   Covered every 4 years -    Fecal Occult Blood Test Covered annually -   Prostate Cancer Screening    Prostate-Specific Antigen (PSA) Annually No results found for: \"PSA\"  Health Maintenance   Topic Date Due   • PSA  09/20/2025      Immunizations    Influenza Covered once per flu season  Please get every  year 09/19/2023  No recommendations at this time    Pneumococcal Each vaccine ([id number] & [id number]) covered once after 65 Prevnar 13: 10/11/2017    [id number]: 09/27/2019     No recommendations at this time    Hepatitis B One screening covered for patients with certain risk factors   09/19/2023  No recommendations at this time    Tetanus Toxoid Not covered by Medicare Part B unless medically necessary (cut with metal); may be covered with your pharmacy prescription benefits -    Tetanus, Diptheria and Pertusis TD and TDaP Not covered by Medicare Part B -  No recommendations at this time    Zoster Not covered by Medicare Part B; may be covered with your pharmacy  prescription benefits -  No recommendations at this time     Diabetes      Hemoglobin A1C Annually; if last result is elevated, may be asked to retest more frequently.    Medicare covers every 3 months Lab Results   Component Value Date     (H) 03/15/2024    A1C 6.3 (H) 03/15/2024       No recommendations at this time    Creat/alb ratio Annually Lab Results   Component Value Date    MICROALBCREA 11.2 03/15/2024    MALBCRECALC 12.9 09/25/2012       LDL Annually Lab Results   Component Value Date    LDL 58 03/15/2024       Dilated Eye Exam Annually [unfilled]     Annual Monitoring of Persistent Medications (ACE/ARB, digoxin diuretics, anticonvulsants)    Potassium Annually Lab Results   Component Value Date    K 4.5 03/15/2024         Creatinine   Annually Lab Results   Component Value Date    CREATSERUM 1.06 03/15/2024         BUN Annually Lab Results   Component Value Date    BUN 16 03/15/2024       Drug Serum Conc Annually No results found for: \"DIGOXIN\", \"DIG\", \"VALP\"

## 2024-03-26 NOTE — PROGRESS NOTES
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 biopsies positive for Martinez's -aspirin decreased to 81 mg from 325 mg at last endoscopy in 2020.  Repeat endoscopy in 3 years-scheduled EGD 4/19/2023  Orders:  -     Detailed, Mod Complex (74575)  Continue pantoprazole  EGD every 3 years    15. Tubular adenoma of colon  Overview:  1/14/2022 colonoscopy- tubular adenoma- Dr. Stokes  Orders:  -     Detailed, Mod Complex (90121)  Repeat in 3 years  16. Benign prostatic hyperplasia without lower urinary tract symptoms  -     Detailed, Mod Complex (51459)  -     Tamsulosin HCl; Take 2 capsules (0.8 mg total) by mouth daily.  Dispense: 180 capsule; Refill: 0  Controlled  Follow-up with urology as needed    17. Asymptomatic microscopic hematuria  -     Detailed, Mod Complex (86169)  Follow-up with Dr. Eduardo  18. KRISTEN on CPAP  -     Detailed, Mod Complex (49837)  Schedule annual visit with pulmonologist    19. Sensorineural hearing loss (SNHL) of both ears  Continue hearing aids bilateral    The patient indicates understanding of these issues and agrees to the plan.  Consult ordered.  Lab work ordered.  Prescription medication ordered.  Reinforced healthy diet, lifestyle, and exercise.      Return in 6 months (on 9/26/2024).  Discussed diabetes and labs    Caitlin Samayoa DO, 3/26/2024      Supplementary Documentation:   General Health:  In the past six months, have you lost more than 10 pounds without trying?: 2 - No  Has your appetite been poor?: No  Type of Diet: Balanced  How would you describe your daily physical activity?: Moderate  How would you describe your current health state?: Fair  How do you maintain positive mental well-being?: Visiting Friends;Visiting Family  On a scale of 0 to 10, with 0 being no pain and 10 being severe pain, what is your pain level?: 6 - (Moderate)  In the past six months, have you experienced urine leakage?: 0-No  At any time do you feel concerned for the safety/well-being of yourself and/or your children, in your home or elsewhere?: No  Have you had any immunizations at another office such as Influenza, Hepatitis B, Tetanus, or Pneumococcal?: No        Jatinder Ennis's SCREENING SCHEDULE   Tests on this list are recommended by your physician but may not be covered, or covered at this frequency, by your insurer.   Please check with your insurance carrier before scheduling to verify coverage.   PREVENTATIVE SERVICES FREQUENCY &  COVERAGE DETAILS LAST COMPLETION DATE   Diabetes Screening    Fasting Blood Sugar / Glucose    One screening every 12 months if never tested or if previously tested but not diagnosed with pre-diabetes   One screening every 6 months if diagnosed with pre-diabetes Lab Results   Component Value Date     (H) 03/15/2024        Cardiovascular Disease Screening    Lipid Panel  Cholesterol  Lipoprotein (HDL)  Triglycerides Covered every 5 years for all Medicare beneficiaries without apparent signs or symptoms of cardiovascular disease Lab Results   Component Value Date    CHOLEST 129 03/15/2024    HDL 50 03/15/2024    LDL 58 03/15/2024    TRIG 117 03/15/2024         Electrocardiogram (EKG)   Covered if needed at Welcome to Medicare, and non-screening if indicated for medical reasons 07/16/2020      Ultrasound Screening for Abdominal Aortic  Aneurysm (AAA) Covered once in a lifetime for one of the following risk factors    Men who are 65-75 years old and have ever smoked    Anyone with a family history -     Colorectal Cancer Screening  Covered for ages 50-85; only need ONE of the following:    Colonoscopy   Covered every 10 years    Covered every 2 years if patient is at high risk or previous colonoscopy was abnormal 01/14/2022    Health Maintenance   Topic Date Due    Colorectal Cancer Screening  01/14/2025       Flexible Sigmoidoscopy   Covered every 4 years -    Fecal Occult Blood Test Covered annually -   Prostate Cancer Screening    Prostate-Specific Antigen (PSA) Annually No results found for: \"PSA\"  Health Maintenance   Topic Date Due    PSA  09/20/2025      Immunizations    Influenza Covered once per flu season  Please get every year 09/19/2023  No recommendations at this time    Pneumococcal Each vaccine ([id number] & [id number]) covered once after 65 Prevnar 13: 10/11/2017    Bowslbskd22: 09/27/2019     No recommendations at this time    Hepatitis B One screening covered for patients with certain risk factors   09/19/2023  No recommendations at this time    Tetanus Toxoid Not covered by Medicare Part B unless medically necessary (cut with metal); may be covered with your pharmacy prescription benefits -    Tetanus, Diptheria and Pertusis TD and TDaP Not covered by Medicare Part B -  No recommendations at this time    Zoster Not covered by Medicare Part B; may be covered with your pharmacy  prescription benefits -  No recommendations at this time     Diabetes      Hemoglobin A1C Annually; if last result is elevated, may be asked to retest more frequently.    Medicare covers every 3 months Lab Results   Component Value Date     (H) 03/15/2024    A1C 6.3 (H) 03/15/2024       No recommendations at this time    Creat/alb ratio Annually Lab Results   Component Value Date    MICROALBCREA 11.2 03/15/2024    MALBCRECALC 12.9 09/25/2012        LDL Annually Lab Results   Component Value Date    LDL 58 03/15/2024       Dilated Eye Exam Annually Last Diabetic Eye Exam:  Last Dilated Eye Exam: 09/18/23  Eye Exam shows Diabetic Retinopathy?: No       Annual Monitoring of Persistent Medications (ACE/ARB, digoxin diuretics, anticonvulsants)    Potassium Annually Lab Results   Component Value Date    K 4.5 03/15/2024         Creatinine   Annually Lab Results   Component Value Date    CREATSERUM 1.06 03/15/2024         BUN Annually Lab Results   Component Value Date    BUN 16 03/15/2024       Drug Serum Conc Annually No results found for: \"DIGOXIN\", \"DIG\", \"VALP\"

## 2024-03-27 PROBLEM — J10.1 INFLUENZA A: Status: RESOLVED | Noted: 2024-01-08 | Resolved: 2024-03-27

## 2024-03-27 PROBLEM — I70.202 ATHEROSCLEROSIS OF ARTERY OF LEFT LOWER EXTREMITY (HCC): Status: RESOLVED | Noted: 2020-06-22 | Resolved: 2024-03-27

## 2024-03-27 PROBLEM — I70.202 ATHEROSCLEROSIS OF ARTERY OF LEFT LOWER EXTREMITY: Status: RESOLVED | Noted: 2020-06-22 | Resolved: 2024-03-27

## 2024-03-27 RX ORDER — DIAZEPAM 10 MG/1
1 TABLET ORAL EVERY 6 HOURS PRN
COMMUNITY
Start: 2024-03-27

## 2024-04-02 ENCOUNTER — MED REC SCAN ONLY (OUTPATIENT)
Dept: FAMILY MEDICINE CLINIC | Facility: CLINIC | Age: 74
End: 2024-04-02

## 2024-05-23 ENCOUNTER — HOSPITAL ENCOUNTER (EMERGENCY)
Age: 74
Discharge: HOME | End: 2024-05-24
Attending: EMERGENCY MEDICINE

## 2024-05-23 VITALS
RESPIRATION RATE: 17 BRPM | BODY MASS INDEX: 33 KG/M2 | DIASTOLIC BLOOD PRESSURE: 60 MMHG | OXYGEN SATURATION: 97 % | TEMPERATURE: 99 F | SYSTOLIC BLOOD PRESSURE: 129 MMHG | WEIGHT: 250 LBS | HEART RATE: 67 BPM

## 2024-05-23 DIAGNOSIS — J18.9 COMMUNITY ACQUIRED PNEUMONIA OF LEFT LOWER LOBE OF LUNG: Primary | ICD-10-CM

## 2024-05-23 PROCEDURE — 99284 EMERGENCY DEPT VISIT MOD MDM: CPT

## 2024-05-24 ENCOUNTER — APPOINTMENT (OUTPATIENT)
Dept: GENERAL RADIOLOGY | Age: 74
End: 2024-05-24
Attending: EMERGENCY MEDICINE

## 2024-05-24 LAB
POCT INFLUENZA A: NEGATIVE
POCT INFLUENZA B: NEGATIVE
SARS-COV-2 RNA RESP QL NAA+PROBE: NOT DETECTED

## 2024-05-24 PROCEDURE — 71045 X-RAY EXAM CHEST 1 VIEW: CPT | Performed by: EMERGENCY MEDICINE

## 2024-05-24 PROCEDURE — 87502 INFLUENZA DNA AMP PROBE: CPT | Performed by: EMERGENCY MEDICINE

## 2024-05-24 PROCEDURE — 87502 INFLUENZA DNA AMP PROBE: CPT

## 2024-05-24 RX ORDER — BENZONATATE 100 MG/1
100 CAPSULE ORAL 3 TIMES DAILY PRN
Qty: 20 CAPSULE | Refills: 0 | Status: SHIPPED | OUTPATIENT
Start: 2024-05-24

## 2024-05-24 RX ORDER — AZITHROMYCIN 250 MG/1
TABLET, FILM COATED ORAL
Qty: 6 TABLET | Refills: 0 | Status: SHIPPED | OUTPATIENT
Start: 2024-05-24 | End: 2024-05-29

## 2024-05-24 NOTE — ED PROVIDER NOTES
Patient Seen in: [facility] In [place]      History     Chief Complaint   Patient presents with    Cough/URI     Stated Complaint: coughing x1 week, denies fever    Subjective:   HPI    Patient is a 73-year-old male who has been having a productive cough and nasal drainage for the last week.  Also has been running low-grade fever.  States his sputum has been green and yellow.  No other complaints.  No nausea or vomiting.    Objective:   Past Medical History:    AAA (abdominal aortic aneurysm) (HCC)    Arthritis    Atherosclerosis of coronary artery    Back pain    Martinez's esophagus determined by endoscopy    EEN-Fiwhsqpz-dthkbboio, biopsies positive for Martinez's -aspirin decreased to 81 mg from 325 mg at last endoscopy in 2020.  Repeat endoscopy in 3 years-scheduled EGD 4/19/2023    Bilateral hip pain    Bleeding nose    BPH (benign prostatic hyperplasia)    Calculus of kidney    Carotid stenosis    mild    Cataract    Cataract, left    already had R eye update 2016    Cervical disc herniation    Change in hair    Started going grey.    Chest pain on exertion    Drs could not get stint put in    Congestive heart disease (HCC)    Coronary atherosclerosis    COVID-19 virus detected    patient has loss of taste and smell    Diastasis recti    ventral    Disorder of prostate    Diverticulosis    Easy bruising    Erythrocytosis    Frequent urination    Hearing impairment    bilateral hearing aids    Hearing loss    Heart attack (HCC)    Heel spur, left    Hemorrhoids    High blood pressure    High cholesterol    History of kidney stones    Hyperlipidemia    Obesity (BMI 30-39.9)    Obstructive sleep apnea (adult) (pediatric)    AHI 35.8 autoPAP 10-20 HME    KRISTEN on CPAP    Osteoarthrosis, unspecified whether generalized or localized, unspecified site    Pain in joints    Peripheral vascular disease (HCC)    Personal history of other diseases of circulatory system    CAD    Plantar fasciitis     Polycythemia    History of polycythemia due to obstructive sleep apnea and followed previously by Dr. Celaya.  Denies headache or abdominal pain.  Denies bruising.  Last CBC 1/15/2019 is normal.  Recommend annual CBC by hematology.    PVD (peripheral vascular disease) (HCC)    On screening  legs erum    S/P CABG (coronary artery bypass graft)    Shortness of breath    with exertion    Sleep apnea    Stented coronary artery    Not in heart, off of aorta to both legs    Trochanteric bursitis of right hip    Type II or unspecified type diabetes mellitus without mention of complication, not stated as uncontrolled              Past Surgical History:   Procedure Laterality Date    Angiogram      stents in aorta    Angioplasty (coronary)      Cabg  2003    X 4 vessels    Cataract  2018    Right eye-Dr. Oppenheim    Cataract      Cath bare metal stent (bms)      Cholecystectomy      Colonoscopy      Colonoscopy N/A 2015    Procedure: COLONOSCOPY;  Surgeon: Bayron Barlow MD;  Location:  ENDOSCOPY    Colonoscopy N/A 2020    Procedure: COLONOSCOPY;  Surgeon: Alberto Stokes DO;  Location:  ENDOSCOPY    Colonoscopy N/A 2021    Procedure: COLONOSCOPY;  Surgeon: Alberto Stokes DO;  Location:  ENDOSCOPY    Colonoscopy N/A 2022    Procedure: COLONOSCOPY with colds snare polypectomy;  Surgeon: Alberto Stokes DO;  Location:  ENDOSCOPY    Cystoscopy,insert ureteral stent  2014    Other surgical history      passed kidney stone via stent    Tonsillectomy      Vasectomy                  Social History     Socioeconomic History    Marital status:    Tobacco Use    Smoking status: Former     Current packs/day: 0.00     Average packs/day: 1 pack/day for 40.0 years (40.0 ttl pk-yrs)     Types: Cigarettes     Start date: 1967     Quit date: 2003     Years since quittin.0    Smokeless tobacco: Never   Vaping Use    Vaping status: Never Used   Substance and  Sexual Activity    Alcohol use: Yes     Alcohol/week: 4.0 standard drinks of alcohol     Types: 2 Glasses of wine, 1 Cans of beer, 1 Shots of liquor per week     Comment: Occasional    Drug use: Never    Sexual activity: Yes   Other Topics Concern    Caffeine Concern No    Stress Concern No    Weight Concern Yes    Special Diet No    Exercise Yes    Seat Belt No     Social Determinants of Health     Physical Activity: Sufficiently Active (3/1/2021)    Received from [facility]    Exercise Vital Sign     Days of Exercise per Week: 5 days     Minutes of Exercise per Session: 30 min              Review of Systems    Positive for stated complaint: coughing x1 week, denies fever  Other systems are as noted in HPI.  Constitutional and vital signs reviewed.      All other systems reviewed and negative except as noted above.    Physical Exam     ED Triage Vitals [05/23/24 2351]   /60   Pulse 67   Resp 17   Temp 99.1 °F (37.3 °C)   Temp src    SpO2 97 %   O2 Device None (Room air)       Current Vitals:   Vital Signs  BP: 129/60  Pulse: 67  Resp: 17  Temp: 99.1 °F (37.3 °C)    Oxygen Therapy  SpO2: 97 %  O2 Device: None (Room air)            Physical Exam      Vital signs reviewed  General appearance: Patient is alert and in no acute distress does have a coarse sounding cough  HEENT: Pupils equal react to light extraocular muscles intact no scleral icterus, mucous membranes are moist, there is no erythema or exudate in the posterior pharynx  Neck: Supple no JVD no lymphadenopathy no meningismus no carotid bruit  CV: Regular rate and rhythm no murmur rub  Respiratory: Crackles in the left base.  Right lung is clear no accessory muscle use  Abdomen: Soft nontender nondistended, no rebound no guarding  no hepatosplenomegaly bowel sounds are present , no pulsatile mass  Extremities: No clubbing cyanosis or edema 2+ distal pulses.  Neuro: Cranial nerves II through XII intact with no gross  focal sensory or motor abnormality.      ED Course     Labs Reviewed   POCT FLU TEST - Normal    Narrative:     This assay is a rapid molecular in vitro test utilizing nucleic acid amplification of influenza A and B viral RNA.   RAPID SARS-COV-2 BY PCR - Normal             Patient was evaluated had a flu and COVID which were unremarkable.  CXR 00:57 single AP view      IMPRESSION:  Comparison: 12/20/2018.    No focal consolidation.  Possible small right pleural effusion.  Heart size is normal.  Sternotomy wires, with the uppermost wire fractured, similar compared to the prior.      Radiologist only identified a small possible right pleural effusion but in my opinion there is some consolidation in the left lower lobe and he does have crackles there.  Also temp of 99 1 and yellow and green sputum do feel he is got a low-lying pneumonia.  Will prescribe a Z-Parth and also some cough medication but would like him to have a repeat x-ray in a week.  He agrees with plan       MDM      Differential diagnosis reflecting the complexity of care include: Bronchitis, viral URI, pneumonia      My independent interpretation of studies of: Chest x-ray shows some consolidation in the left lower lobe could be atelectasis versus early infiltrate      Shared decision making was done by myself and patient.  Will give him a Z-Parth along with some cough medication.  Would like him to follow-up for repeat x-ray in a week.  Return if any worsening symptoms.    Patient was screened and evaluated during this visit.  As the treating physician attending to the patient, I determined within reasonable clinical confidence and prior to discharge, that an emergency medical condition was not or was no longer present.  There was no indication for further evaluation, treatment, or admission on an emergency basis.  Comprehensive verbal and written discharge and follow-up instructions were provided to help prevent relapse or worsening.  Patient was  instructed to follow-up with primary care provider for further evaluation treatment, return immediately to ER for worsening, concerning, new, or changing/persisting symptoms.  I discussed the case with the patient and they had no questions, complaints, or concerns.  Patient was comfortable going home.      Dictation Disclaimer Note:   To increase efficiency this document may have been prepared using voice recognition technology. Every effort has been made to correct any errors made during preparation of this note. However, if a word or phrase is confusing, or does not make sense, this is likely due to a recognition error within the program which was not discovered during editing. Please do not hesitate to contact to address any significant errors.    Note to Patient:   The 21st Century Cures Act makes medical notes like these available to patients in the interest of transparency. Please be advised this is a medical document. Medical documents are intended to carry relevant information, facts as evident, and the clinical opinion of the practitioner. The medical note is intended as peer to peer communication and may appear blunt or direct. It is written in medical language and may contain abbreviations or verbiage that are unfamiliar.                                          Medical Decision Making      Disposition and Plan     Clinical Impression:  1. Community acquired pneumonia of left lower lobe of lung         Disposition:  Discharge  5/24/2024  1:41 am    Follow-up:  Caitlin Samayoa DO  [address] [facility] [address]  [phone]    Follow up            Medications Prescribed:  Current Discharge Medication List        START taking these medications    Details   azithromycin (ZITHROMAX Z-RICK) 250 MG Oral Tab 500 mg once followed by 250 mg daily x 4 days  Qty: 6 tablet, Refills: 0      benzonatate (TESSALON PERLES) 100 MG Oral Cap Take 1 capsule (100 mg total) by mouth 3 (three) times daily as needed for  cough.  Qty: 20 capsule, Refills: 0

## 2024-05-29 ENCOUNTER — PATIENT OUTREACH (OUTPATIENT)
Dept: INTERNAL MEDICINE CLINIC | Facility: CLINIC | Age: 74
End: 2024-05-29

## 2024-05-29 NOTE — PROGRESS NOTES
Called pt 1st attempt ER f/u appt request.  No answer, LVMTCB to schedule appts  PCP - unable to contact.    Closing encounter

## 2024-06-01 ENCOUNTER — APPOINTMENT (OUTPATIENT)
Dept: GENERAL RADIOLOGY | Age: 74
End: 2024-06-01
Attending: EMERGENCY MEDICINE
Payer: MEDICARE

## 2024-06-01 ENCOUNTER — HOSPITAL ENCOUNTER (EMERGENCY)
Age: 74
Discharge: HOME | End: 2024-06-01
Attending: EMERGENCY MEDICINE
Payer: MEDICARE

## 2024-06-01 VITALS
OXYGEN SATURATION: 99 % | BODY MASS INDEX: 33 KG/M2 | SYSTOLIC BLOOD PRESSURE: 105 MMHG | TEMPERATURE: 98 F | HEART RATE: 66 BPM | RESPIRATION RATE: 18 BRPM | DIASTOLIC BLOOD PRESSURE: 56 MMHG | WEIGHT: 250 LBS

## 2024-06-01 DIAGNOSIS — J40 BRONCHITIS: Primary | ICD-10-CM

## 2024-06-01 LAB
ALBUMIN SERPL-MCNC: 3.2 G/DL (ref 3.4–5)
ALBUMIN/GLOB SERPL: 0.9 {RATIO} (ref 1–2)
ALP LIVER SERPL-CCNC: 54 U/L
ALT SERPL-CCNC: 37 U/L
ANION GAP SERPL CALC-SCNC: 4 MMOL/L (ref 0–18)
AST SERPL-CCNC: 27 U/L (ref 15–37)
BASOPHILS # BLD AUTO: 0.04 X10(3) UL (ref 0–0.2)
BASOPHILS NFR BLD AUTO: 0.5 %
BILIRUB SERPL-MCNC: 0.5 MG/DL (ref 0.1–2)
BUN BLD-MCNC: 21 MG/DL (ref 9–23)
CALCIUM BLD-MCNC: 8.6 MG/DL (ref 8.5–10.1)
CHLORIDE SERPL-SCNC: 109 MMOL/L (ref 98–112)
CO2 SERPL-SCNC: 24 MMOL/L (ref 21–32)
CREAT BLD-MCNC: 1.33 MG/DL
EGFRCR SERPLBLD CKD-EPI 2021: 56 ML/MIN/1.73M2 (ref 60–?)
EOSINOPHIL # BLD AUTO: 0.18 X10(3) UL (ref 0–0.7)
EOSINOPHIL NFR BLD AUTO: 2.1 %
ERYTHROCYTE [DISTWIDTH] IN BLOOD BY AUTOMATED COUNT: 12.8 %
GLOBULIN PLAS-MCNC: 3.6 G/DL (ref 2.8–4.4)
GLUCOSE BLD-MCNC: 115 MG/DL (ref 70–99)
HCT VFR BLD AUTO: 43.2 %
HGB BLD-MCNC: 14.9 G/DL
IMM GRANULOCYTES # BLD AUTO: 0.03 X10(3) UL (ref 0–1)
IMM GRANULOCYTES NFR BLD: 0.4 %
LYMPHOCYTES # BLD AUTO: 2.29 X10(3) UL (ref 1–4)
LYMPHOCYTES NFR BLD AUTO: 27 %
MCH RBC QN AUTO: 30.7 PG (ref 26–34)
MCHC RBC AUTO-ENTMCNC: 34.5 G/DL (ref 31–37)
MCV RBC AUTO: 88.9 FL
MONOCYTES # BLD AUTO: 0.82 X10(3) UL (ref 0.1–1)
MONOCYTES NFR BLD AUTO: 9.7 %
NEUTROPHILS # BLD AUTO: 5.11 X10 (3) UL (ref 1.5–7.7)
NEUTROPHILS # BLD AUTO: 5.11 X10(3) UL (ref 1.5–7.7)
NEUTROPHILS NFR BLD AUTO: 60.3 %
OSMOLALITY SERPL CALC.SUM OF ELEC: 288 MOSM/KG (ref 275–295)
PLATELET # BLD AUTO: 253 10(3)UL (ref 150–450)
POTASSIUM SERPL-SCNC: 4.1 MMOL/L (ref 3.5–5.1)
PROT SERPL-MCNC: 6.8 G/DL (ref 6.4–8.2)
RBC # BLD AUTO: 4.86 X10(6)UL
SARS-COV-2 RNA RESP QL NAA+PROBE: NOT DETECTED
SODIUM SERPL-SCNC: 137 MMOL/L (ref 136–145)
TROPONIN I SERPL HS-MCNC: 12 NG/L
WBC # BLD AUTO: 8.5 X10(3) UL (ref 4–11)

## 2024-06-01 PROCEDURE — 84484 ASSAY OF TROPONIN QUANT: CPT | Performed by: EMERGENCY MEDICINE

## 2024-06-01 PROCEDURE — 85025 COMPLETE CBC W/AUTO DIFF WBC: CPT | Performed by: EMERGENCY MEDICINE

## 2024-06-01 PROCEDURE — 93005 ELECTROCARDIOGRAM TRACING: CPT

## 2024-06-01 PROCEDURE — 99284 EMERGENCY DEPT VISIT MOD MDM: CPT

## 2024-06-01 PROCEDURE — 80053 COMPREHEN METABOLIC PANEL: CPT | Performed by: EMERGENCY MEDICINE

## 2024-06-01 PROCEDURE — 71046 X-RAY EXAM CHEST 2 VIEWS: CPT | Performed by: EMERGENCY MEDICINE

## 2024-06-01 PROCEDURE — 93010 ELECTROCARDIOGRAM REPORT: CPT

## 2024-06-01 PROCEDURE — 94640 AIRWAY INHALATION TREATMENT: CPT

## 2024-06-01 RX ORDER — IPRATROPIUM BROMIDE AND ALBUTEROL SULFATE 2.5; .5 MG/3ML; MG/3ML
3 SOLUTION RESPIRATORY (INHALATION) ONCE
Status: COMPLETED | OUTPATIENT
Start: 2024-06-01 | End: 2024-06-01

## 2024-06-01 RX ORDER — PREDNISONE 20 MG/1
60 TABLET ORAL ONCE
Status: COMPLETED | OUTPATIENT
Start: 2024-06-01 | End: 2024-06-01

## 2024-06-01 RX ORDER — ALBUTEROL SULFATE 90 UG/1
2 AEROSOL, METERED RESPIRATORY (INHALATION) EVERY 4 HOURS PRN
Qty: 1 EACH | Refills: 0 | Status: SHIPPED | OUTPATIENT
Start: 2024-06-01 | End: 2024-07-01

## 2024-06-01 RX ORDER — PREDNISONE 20 MG/1
40 TABLET ORAL DAILY
Qty: 8 TABLET | Refills: 0 | Status: SHIPPED | OUTPATIENT
Start: 2024-06-01 | End: 2024-06-05

## 2024-06-01 NOTE — ED PROVIDER NOTES
Patient Seen in: [facility] In [place]      History     Chief Complaint   Patient presents with    Cough/URI     Stated Complaint: Pt seen here last week, dx with pneumonia but patient states he is feeling wors*    Subjective:   HPI    This is a 73-year-old male past medical history of AAA, Martinez's esophagus, carotid stenosis, cataracts, congestive heart failure, kidney stones, obesity, obstructive sleep apnea/CPAP, coronary disease with stents, diabetes who presents today he is not feeling better after being diagnosed with pneumonia on 5/24/2024.  He states when he was first seen he had a cough.  No fevers.  He was seen in our ED.  There is a questionable area of infiltrate in the left lower lobe.  He was placed on a Z-Parth.  He initially felt better for couple days but states he felt much worse last night and started complaining of congestion and a cough with yellow sputum.  No fevers.  No shortness of breath.  No chest pain.  Quit smoking tobacco 2003.  20-pack-year history.  Social alcohol.  No vomiting or diarrhea.  Because he was not improving he came here for further evaluation.    Objective:   Past Medical History:    AAA (abdominal aortic aneurysm) (HCC)    Arthritis    Atherosclerosis of coronary artery    Back pain    Martinez's esophagus determined by endoscopy    TVX-Rtoiobxl-lqejxraif, biopsies positive for Martinez's -aspirin decreased to 81 mg from 325 mg at last endoscopy in 2020.  Repeat endoscopy in 3 years-scheduled EGD 4/19/2023    Bilateral hip pain    Bleeding nose    BPH (benign prostatic hyperplasia)    Calculus of kidney    Carotid stenosis    mild    Cataract    Cataract, left    already had R eye update 2016    Cervical disc herniation    Change in hair    Started going grey.    Chest pain on exertion    Drs could not get stint put in    Congestive heart disease (HCC)    Coronary atherosclerosis    COVID-19 virus detected    patient has loss of taste and smell     Diastasis recti    ventral    Disorder of prostate    Diverticulosis    Easy bruising    Erythrocytosis    Frequent urination    Hearing impairment    bilateral hearing aids    Hearing loss    Heart attack (HCC)    Heel spur, left    Hemorrhoids    High blood pressure    High cholesterol    History of kidney stones    Hyperlipidemia    Obesity (BMI 30-39.9)    Obstructive sleep apnea (adult) (pediatric)    AHI 35.8 autoPAP 10-20 HME    KRISTEN on CPAP    Osteoarthrosis, unspecified whether generalized or localized, unspecified site    Pain in joints    Peripheral vascular disease (HCC)    Personal history of other diseases of circulatory system    CAD    Plantar fasciitis    Polycythemia    History of polycythemia due to obstructive sleep apnea and followed previously by Dr. Celaya.  Denies headache or abdominal pain.  Denies bruising.  Last CBC 1/15/2019 is normal.  Recommend annual CBC by hematology.    PVD (peripheral vascular disease) (HCC)    On screening 2014 legs erum    S/P CABG (coronary artery bypass graft)    Shortness of breath    with exertion    Sleep apnea    Stented coronary artery    Not in heart, off of aorta to both legs    Trochanteric bursitis of right hip    Type II or unspecified type diabetes mellitus without mention of complication, not stated as uncontrolled              Past Surgical History:   Procedure Laterality Date    Angiogram      stents in aorta    Angioplasty (coronary)      Cabg  2003    X 4 vessels    Cataract  2018    Right eye-Dr. Oppenheim    Cataract      Cath bare metal stent (bms)      Cholecystectomy  2003    Colonoscopy  2003    Colonoscopy N/A 01/30/2015    Procedure: COLONOSCOPY;  Surgeon: Bayron Barlow MD;  Location:  ENDOSCOPY    Colonoscopy N/A 01/17/2020    Procedure: COLONOSCOPY;  Surgeon: Alberto Stokes DO;  Location:  ENDOSCOPY    Colonoscopy N/A 03/03/2021    Procedure: COLONOSCOPY;  Surgeon: Alberto Stokes DO;  Location:  ENDOSCOPY    Colonoscopy  N/A 2022    Procedure: COLONOSCOPY with colds snare polypectomy;  Surgeon: Alberto Stokes DO;  Location:  ENDOSCOPY    Cystoscopy,insert ureteral stent  2014    Other surgical history      passed kidney stone via stent    Tonsillectomy      Vasectomy                  Social History     Socioeconomic History    Marital status:    Tobacco Use    Smoking status: Former     Current packs/day: 0.00     Average packs/day: 1 pack/day for 40.0 years (40.0 ttl pk-yrs)     Types: Cigarettes     Start date: 1967     Quit date: 2003     Years since quittin.1    Smokeless tobacco: Never   Vaping Use    Vaping status: Never Used   Substance and Sexual Activity    Alcohol use: Yes     Alcohol/week: 4.0 standard drinks of alcohol     Types: 2 Glasses of wine, 1 Cans of beer, 1 Shots of liquor per week     Comment: Occasional    Drug use: Never    Sexual activity: Yes   Other Topics Concern    Caffeine Concern No    Stress Concern No    Weight Concern Yes    Special Diet No    Exercise Yes    Seat Belt No     Social Determinants of Health     Physical Activity: Sufficiently Active (3/1/2021)    Received from [facility]    Exercise Vital Sign     Days of Exercise per Week: 5 days     Minutes of Exercise per Session: 30 min              Review of Systems    Positive for stated complaint: Pt seen here last week, dx with pneumonia but patient states he is feeling wors*  Other systems are as noted in HPI.  Constitutional and vital signs reviewed.      All other systems reviewed and negative except as noted above.    Physical Exam     ED Triage Vitals [24 1602]   /61   Pulse 60   Resp 20   Temp 98.1 °F (36.7 °C)   Temp src Temporal   SpO2 100 %   O2 Device None (Room air)       Current Vitals:   Vital Signs  BP: 114/61  Pulse: 60  Resp: 20  Temp: 98.1 °F (36.7 °C)  Temp src: Temporal    Oxygen Therapy  SpO2: 100 %  O2 Device: None (Room  air)            Physical Exam    GENERAL: Awake, alert oriented x3, nontoxic appearing.   SKIN: Normal, warm, and dry.  HEENT:  Pupils equally round and reactive to light. Conjuctiva clear.  Oropharynx is clear and moist.   Lungs: Crackles at the left base.  Inspiratory expiratory wheezing.  HEART:  Regular rate and rhythm. S1 and S2. No murmurs, no rubs or gallops.   ABDOMEN: Soft, nontender and nondistended. Normoactive bowel sounds. No rebound. No guarding.   EXTREMITIES: Warm with brisk capillary refill.         ED Course     Labs Reviewed   COMP METABOLIC PANEL (14) - Abnormal; Notable for the following components:       Result Value    Glucose 115 (*)     Creatinine 1.33 (*)     eGFR-Cr 56 (*)     Albumin 3.2 (*)     A/G Ratio 0.9 (*)     All other components within normal limits   TROPONIN I HIGH SENSITIVITY - Normal   RAPID SARS-COV-2 BY PCR - Normal   CBC WITH DIFFERENTIAL WITH PLATELET    Narrative:     The following orders were created for panel order CBC With Differential With Platelet.  Procedure                               Abnormality         Status                     ---------                               -----------         ------                     CBC W/ DIFFERENTIAL[[id number]]                              Final result                 Please view results for these tests on the individual orders.   RAINBOW DRAW BLUE   CBC W/ DIFFERENTIAL             EKG    Rate, intervals and axes as noted on EKG Report.  Rate: 66  Rhythm: Sinus Rhythm  Reading: Inferior infarct.  Present on previous EKGs.      XR CHEST PA + LAT CHEST (CPT=71046)    Result Date: 6/1/2024  PROCEDURE:  XR CHEST PA + LAT CHEST (CPT=71046)  INDICATIONS:  Pt seen here last week, dx with pneumonia but patient states he is feeling worse.  COMPARISON:  [facility], XR, XR CHEST AP PORTABLE  (CPT=71045), 5/24/2024, 0:57 AM.  TECHNIQUE:  PA and lateral chest radiographs were obtained.  PATIENT STATED HISTORY: (As transcribed by  Technologist)  Patient states persistant cough, diagnosis of pneumonia last week.   FINDINGS:  Sternotomy and bypass changes.  The lungs are hyperinflated. The heart is mildly increased in size.  The lungs are clear.  The costophrenic angles are sharp.  There is no active disease seen.            CONCLUSION:  Hyperinflated lungs. Otherwise, no active disease seen.  Postsurgical changes.   LOCATION:  [facility]   Dictated by (CST): Eliseo Da Silva MD on 6/01/2024 at 5:09 PM     Finalized by (CST): Eliseo Da Silva MD on 6/01/2024 at 5:09 PM       XR CHEST AP PORTABLE  (CPT=71045)    Result Date: 5/24/2024  PROCEDURE:  XR CHEST AP PORTABLE  (CPT=71045)  TECHNIQUE:  AP chest radiograph was obtained.  COMPARISON:  [facility] , XR, XR CHEST PA + LAT CHEST (CPT=71046), 12/20/2018, 3:10 PM.  INDICATIONS:  coughing x1 week, denies fever  PATIENT STATED HISTORY: (As transcribed by Technologist)  Pt complains of a productive cough with a runny nose,  coughing x1 week, denies fever.     FINDINGS:  Median sternotomy changes noted.  Stable fractured upper median sternotomy wire. Cardiomegaly with normal pulmonary vascularity.  Mild scarring/atelectasis in the lower lungs.  No significant pleural effusion or pneumothorax.             CONCLUSION:  No lobar pneumonia or overt congestive failure.  Mild scarring/atelectasis in the lower lungs.  A preliminary report was provided by the [facility]eradiology service.  LOCATION:  [facility]      Dictated by (CST): Rudy Malone MD on 5/24/2024 at 7:42 AM     Finalized by (CST): Rudy Malone MD on 5/24/2024 at 7:42 AM         MDM        This is a 73-year-old male past medical history of AAA, Martinez's esophagus, carotid stenosis, cataracts, congestive heart failure, kidney stones, obesity, obstructive sleep apnea/CPAP, coronary disease with stents, diabetes who presents today he is not feeling better after being diagnosed with pneumonia on 5/24/2024.  Differential includes pneumonia,  bronchitis.    Patient placed on cardiac monitor, continuous pulse oximetry and IV line was established of normal saline.  Basic labs were obtained.  CBC: White blood cell count 8.5.  Hemoglobin 14.9.  Platelet 252.  CMP: BUN 21.  Creatinine 1.3.  Glucose 115.  Bicarb 24.  Troponin 12.    Rapid COVID: Negative        I independently reviewed the chest x-ray which demonstrated no evidence of pneumonia.  Lungs are hyperinflated.  I also reviewed the radiology interpretation and in agreement.      Clinically, suspect a bronchitis.  There is no evidence of pneumonia.  He did just complete a Z-Parth.  Will add albuterol and steroids to his regimen.  Return if worse.  Recheck with PCP on Monday.  Patient discharged home in good condition.          Clinical Impression:  1. Bronchitis         Disposition:  Discharge  6/1/2024  5:48 pm    Follow-up:  Caitlin Samayoa DO  [address] [facility] [address]  [phone]    Follow up on 6/3/2024            Medications Prescribed:  Current Discharge Medication List        START taking these medications    Details   predniSONE 20 MG Oral Tab Take 2 tablets (40 mg total) by mouth daily for 4 days.  Qty: 8 tablet, Refills: 0      albuterol 108 (90 Base) MCG/ACT Inhalation Aero Soln Inhale 2 puffs into the lungs every 4 (four) hours as needed for Wheezing.  Qty: 1 each, Refills: 0

## 2024-06-01 NOTE — DISCHARGE INSTRUCTIONS
Prednisone as prescribed  Albuterol inhaler 2 puffs every 4-6 hours, wean as tolerated  Tylenol for any pain or discomfort  Continue regular medications  Return if worse  Recheck with primary care physician on Monday

## 2024-06-01 NOTE — ED QUICK NOTES
MDI training done by RN, PT able to demonstrate as well as verbalizes understanding of use.   
Other

## 2024-06-02 LAB
ATRIAL RATE: 66 BPM
P AXIS: 60 DEGREES
P-R INTERVAL: 174 MS
Q-T INTERVAL: 426 MS
QRS DURATION: 114 MS
QTC CALCULATION (BEZET): 446 MS
R AXIS: 56 DEGREES
T AXIS: -28 DEGREES
VENTRICULAR RATE: 66 BPM

## 2024-06-04 ENCOUNTER — OFFICE VISIT (OUTPATIENT)
Dept: FAMILY MEDICINE CLINIC | Facility: CLINIC | Age: 74
End: 2024-06-04
Payer: MEDICARE

## 2024-06-04 VITALS
RESPIRATION RATE: 18 BRPM | SYSTOLIC BLOOD PRESSURE: 130 MMHG | BODY MASS INDEX: 34.17 KG/M2 | TEMPERATURE: 99 F | HEART RATE: 70 BPM | HEIGHT: 73 IN | DIASTOLIC BLOOD PRESSURE: 76 MMHG | OXYGEN SATURATION: 95 % | WEIGHT: 257.81 LBS

## 2024-06-04 DIAGNOSIS — S89.92XA INJURY OF LEFT KNEE, INITIAL ENCOUNTER: ICD-10-CM

## 2024-06-04 DIAGNOSIS — E11.51 DM (DIABETES MELLITUS) TYPE II, CONTROLLED, WITH PERIPHERAL VASCULAR DISORDER (HCC): ICD-10-CM

## 2024-06-04 DIAGNOSIS — J40 BRONCHITIS: Primary | ICD-10-CM

## 2024-06-04 LAB
GLUCOSE BLOOD: 117
TEST STRIP LOT #: NORMAL NUMERIC

## 2024-06-04 PROCEDURE — 99213 OFFICE O/P EST LOW 20 MIN: CPT | Performed by: FAMILY MEDICINE

## 2024-06-04 PROCEDURE — 1170F FXNL STATUS ASSESSED: CPT | Performed by: FAMILY MEDICINE

## 2024-06-04 PROCEDURE — 3008F BODY MASS INDEX DOCD: CPT | Performed by: FAMILY MEDICINE

## 2024-06-04 PROCEDURE — 1160F RVW MEDS BY RX/DR IN RCRD: CPT | Performed by: FAMILY MEDICINE

## 2024-06-04 PROCEDURE — 82947 ASSAY GLUCOSE BLOOD QUANT: CPT | Performed by: FAMILY MEDICINE

## 2024-06-04 PROCEDURE — 1159F MED LIST DOCD IN RCRD: CPT | Performed by: FAMILY MEDICINE

## 2024-06-04 PROCEDURE — 1111F DSCHRG MED/CURRENT MED MERGE: CPT | Performed by: FAMILY MEDICINE

## 2024-06-04 PROCEDURE — 3078F DIAST BP <80 MM HG: CPT | Performed by: FAMILY MEDICINE

## 2024-06-04 PROCEDURE — 3075F SYST BP GE 130 - 139MM HG: CPT | Performed by: FAMILY MEDICINE

## 2024-06-04 PROCEDURE — 1126F AMNT PAIN NOTED NONE PRSNT: CPT | Performed by: FAMILY MEDICINE

## 2024-06-04 NOTE — PROGRESS NOTES
CHIEF COMPLAINT:   Chief Complaint   Patient presents with    ER F/U     [facility] in [place] DOS 06/10/2024         HPI:     Jatinder Ennis is a 73 year old male with history of type 2 diabetes presents for follow up bronchitis diagnosed at [facility] ER on 6/10/2024 treated with albuterol and on prednisone 40 mg day 4 of 5.  States he is significantly improved.  He is coughs only once or twice at night and was previously waking up several times at night.  He denies any shortness of breath and the wheezing has resolved.  Still occasionally at night coughing up some yellowish phlegm.  Denies any fever or chills.  Denies any rash or diarrhea.  Labs in the ED showed no significant abnormality contributing to his diagnosis.  Chest x-ray in the ER demonstrated only hyperinflation.  He does have a smoking history.  No history of COPD.  Not checking blood sugars this past week since starting prednisone but is denying polyuria polydipsia or vision changes.  Patient was initially diagnosed with community-acquired anhedonia on left lower lobe in the [facility] ER on 5/24/2024 and was treated with Zithromax which she completed.  His most recent x-ray showed resolution.  His symptoms have greatly improved.    Complains of left knee swelling tripped accidentally fell on his knees about a week ago.  Denies any knee pain.  Knee is not locking or giving out.  Denies any new or worsening hip pain.  Denies other injury.  No LOC.  Denies any balance instability.  Patient is still working and running a business.          HISTORY:  Past Medical History:    AAA (abdominal aortic aneurysm) (HCC)    Arthritis    Atherosclerosis of coronary artery    Back pain    Martinez's esophagus determined by endoscopy    CFF-Esashsty-npvasoryi, biopsies positive for Martinez's -aspirin decreased to 81 mg from 325 mg at last endoscopy in 2020.  Repeat endoscopy in 3 years-scheduled EGD 4/19/2023    Bilateral hip pain     Bleeding nose    BPH (benign prostatic hyperplasia)    Calculus of kidney    Carotid stenosis    mild    Cataract    Cataract, left    already had R eye update 2016    Cervical disc herniation    Change in hair    Started going grey.    Chest pain on exertion    Drs could not get stint put in    Congestive heart disease (HCC)    Coronary atherosclerosis    COVID-19 virus detected    patient has loss of taste and smell    Diastasis recti    ventral    Disorder of prostate    Diverticulosis    Easy bruising    Erythrocytosis    Frequent urination    Hearing impairment    bilateral hearing aids    Hearing loss    Heart attack (HCC)    Heel spur, left    Hemorrhoids    High blood pressure    High cholesterol    History of kidney stones    Hyperlipidemia    Obesity (BMI 30-39.9)    Obstructive sleep apnea (adult) (pediatric)    AHI 35.8 autoPAP 10-20 HME    KRISTEN on CPAP    Osteoarthrosis, unspecified whether generalized or localized, unspecified site    Pain in joints    Peripheral vascular disease (HCC)    Personal history of other diseases of circulatory system    CAD    Plantar fasciitis    Polycythemia    History of polycythemia due to obstructive sleep apnea and followed previously by Dr. Celaya.  Denies headache or abdominal pain.  Denies bruising.  Last CBC 1/15/2019 is normal.  Recommend annual CBC by hematology.    PVD (peripheral vascular disease) (HCC)    On screening 2014 legs erum    S/P CABG (coronary artery bypass graft)    Shortness of breath    with exertion    Sleep apnea    Stented coronary artery    Not in heart, off of aorta to both legs    Trochanteric bursitis of right hip    Type II or unspecified type diabetes mellitus without mention of complication, not stated as uncontrolled      Past Surgical History:   Procedure Laterality Date    Angiogram      stents in aorta    Angioplasty (coronary)      Cabg  2003    X 4 vessels    Cataract  2018    Right eye-Dr. Oppenheim    Cataract      Cath bare  metal stent (bms)      Cholecystectomy      Colonoscopy  2003    Colonoscopy N/A 2015    Procedure: COLONOSCOPY;  Surgeon: Bayron Barlow MD;  Location:  ENDOSCOPY    Colonoscopy N/A 2020    Procedure: COLONOSCOPY;  Surgeon: Alberto Stokes DO;  Location:  ENDOSCOPY    Colonoscopy N/A 2021    Procedure: COLONOSCOPY;  Surgeon: Alberto Stokes DO;  Location:  ENDOSCOPY    Colonoscopy N/A 2022    Procedure: COLONOSCOPY with colds snare polypectomy;  Surgeon: Alberto Stokes DO;  Location:  ENDOSCOPY    Cystoscopy,insert ureteral stent  2014    Other surgical history      passed kidney stone via stent    Tonsillectomy      Vasectomy        Family History   Problem Relation Age of Onset    Arthritis Father     Cancer Father     Heart Disorder Father     Heart Attack Father     Other (CAD) Father     Other (lung cancer) Father     Arthritis Mother     Diabetes Mother     Other (diabetes mellitus) Mother       Social History:   Social History     Socioeconomic History    Marital status:    Tobacco Use    Smoking status: Former     Current packs/day: 0.00     Average packs/day: 1 pack/day for 40.0 years (40.0 ttl pk-yrs)     Types: Cigarettes     Start date: 1967     Quit date: 2003     Years since quittin.1    Smokeless tobacco: Never   Vaping Use    Vaping status: Never Used   Substance and Sexual Activity    Alcohol use: Yes     Alcohol/week: 4.0 standard drinks of alcohol     Types: 2 Glasses of wine, 1 Cans of beer, 1 Shots of liquor per week     Comment: Occasional    Drug use: Never    Sexual activity: Yes   Other Topics Concern    Caffeine Concern No    Stress Concern No    Weight Concern Yes    Special Diet No    Exercise Yes    Seat Belt No     Social Determinants of Health     Physical Activity: Sufficiently Active (3/1/2021)    [facility]    Exercise Vital Sign     Days of Exercise per Week:  5 days     Minutes of Exercise per Session: 30 min        Medications (Active prior to today's visit):  Current Outpatient Medications   Medication Sig Dispense Refill    predniSONE 20 MG Oral Tab Take 2 tablets (40 mg total) by mouth daily for 4 days. 8 tablet 0    albuterol 108 (90 Base) MCG/ACT Inhalation Aero Soln Inhale 2 puffs into the lungs every 4 (four) hours as needed for Wheezing. 1 each 0    lisinopril 10 MG Oral Tab Take 1 tablet (10 mg total) by mouth daily. 90 tablet 1    rosuvastatin 40 MG Oral Tab Take 1 tablet (40 mg total) by mouth nightly. 90 tablet 1    ezetimibe 10 MG Oral Tab Take 1 tablet (10 mg total) by mouth nightly. 90 tablet 1    amLODIPine 5 MG Oral Tab Take 1 tablet (5 mg total) by mouth daily. 90 tablet 1    metoprolol succinate ER 50 MG Oral Tablet 24 Hr Take 1 tablet (50 mg total) by mouth daily. 90 tablet 3    tamsulosin 0.4 MG Oral Cap Take 2 capsules (0.8 mg total) by mouth daily. 180 capsule 0    semaglutide 8 MG/3ML Subcutaneous Solution Pen-injector Inject 2 mg into the skin once a week. 9 mL 0    nitroglycerin 0.4 MG Sublingual SL Tab TAKE 1 TABLET SUBLINGUAL ONCE A DAY AS NEEDED FOR CHEST PAIN      Acetaminophen ER (TYLENOL 8 HOUR ARTHRITIS PAIN) 650 MG Oral Tab CR Take 2 tablets (1,300 mg total) by mouth every 8 (eight) hours as needed for Pain (Max is 6 tablets in 24 hours or 4000 mg of Tylenol/acetaminophen in 24 hours). 180 tablet 1    diclofenac 1 % External Gel Apply 2 g topically 4 (four) times daily. Buy otc 150 g 1    PANTOPRAZOLE 40 MG Oral Tab EC TAKE 1 TABLET ONE TIME DAILY 90 tablet 3    sildenafil 20 MG Oral Tab TAKE 5 TABLETS BY MOUTH 1 HOUR BEFORE SEXUAL ACTIVITY. DO NOT EXCEED 5 TABLETS PER 24 HOURS      Vitamin D3, Cholecalciferol, 50 MCG (2000 UT) Oral Tab vitamin D3  2000 IUs take 1 tablet daily 365 tablet 0    psyllium 28 % Oral Powd Pack Take 1 packet by mouth daily.      aspirin 81 MG Oral Tab EC Take 1 tablet (81 mg total) by mouth daily.       Clopidogrel Bisulfate 75 MG Oral Tab Take 1 tablet (75 mg total) by mouth daily. 30 tablet 0       Allergies:  Allergies   Allergen Reactions    Adhesive Tape ITCHING     Redness/itching EKG stickers       PSFH elements reviewed from today and agreed except as otherwise stated in HPI.  PHYSICAL EXAM:   /76 (BP Location: Left arm, Patient Position: Sitting, Cuff Size: large)   Pulse 70   Temp 98.6 °F (37 °C) (Temporal)   Resp 18   Ht 6' 1\" (1.854 m)   Wt 257 lb 12.8 oz (116.9 kg)   SpO2 95%   BMI 34.01 kg/m²   Vital signs reviewed.Appears stated age, well groomed.  Physical Exam   GENERAL: well developed, well nourished,in no apparent distress  EYES; PERRLA, EOM-i B/L. Sclera clear and non icteric bilateral  SKIN: no rashes,no suspicious lesions  HEENT: atraumatic, normocephalic  NECK: supple,no adenopathy,no bruits, no JVD  LUNGS: clear to auscultation bilateral. No RRW. Good inspiratory and expiratory effort  CARDIO: RRR without murmur, clear S1, S2-heart sounds distant.  VS: no carotid artery bruit bilateral.    GI: good BS's,no masses,No HSM or tenderness.   EXTREMITIES: no cyanosis, clubbing or edema, bilateral. No calf tenderness.  Left knee has diffuse mild edema but no gross effusion.  NROM of knees bilateral.  NEURO: AOx3.  Normal gait.  Sensation intact to touch in the lower extremities.    LABS     Admission on 06/01/2024, Discharged on 06/01/2024   Component Date Value    Glucose 06/01/2024 115 (H)     Sodium 06/01/2024 137     Potassium 06/01/2024 4.1     Chloride 06/01/2024 109     CO2 06/01/2024 24.0     Anion Gap 06/01/2024 4     BUN 06/01/2024 21     Creatinine 06/01/2024 1.33 (H)     Calcium, Total 06/01/2024 8.6     Calculated Osmolality 06/01/2024 288     eGFR-Cr 06/01/2024 56 (L)     AST 06/01/2024 27     ALT 06/01/2024 37     Alkaline Phosphatase 06/01/2024 54     Bilirubin, Total 06/01/2024 0.5     Total Protein 06/01/2024 6.8     Albumin 06/01/2024 3.2 (L)     Globulin  06/01/2024  3.6     A/G Ratio 06/01/2024 0.9 (L)     Troponin I (High Sensiti* 06/01/2024 12     Rapid SARS-CoV-2 by PCR 06/01/2024 Not Detected     Hold Blue 06/01/2024 Auto Resulted     WBC 06/01/2024 8.5     RBC 06/01/2024 4.86     HGB 06/01/2024 14.9     HCT 06/01/2024 43.2     PLT 06/01/2024 253.0     MCV 06/01/2024 88.9     MCH 06/01/2024 30.7     MCHC 06/01/2024 34.5     RDW 06/01/2024 12.8     Neutrophil Absolute Prel* 06/01/2024 5.11     Neutrophil Absolute 06/01/2024 5.11     Lymphocyte Absolute 06/01/2024 2.29     Monocyte Absolute 06/01/2024 0.82     Eosinophil Absolute 06/01/2024 0.18     Basophil Absolute 06/01/2024 0.04     Immature Granulocyte Abs* 06/01/2024 0.03     Neutrophil % 06/01/2024 60.3     Lymphocyte % 06/01/2024 27.0     Monocyte % 06/01/2024 9.7     Eosinophil % 06/01/2024 2.1     Basophil % 06/01/2024 0.5     Immature Granulocyte % 06/01/2024 0.4     Ventricular rate 06/01/2024 66     Atrial rate 06/01/2024 66     P-R Interval 06/01/2024 174     QRS Duration 06/01/2024 114     Q-T Interval 06/01/2024 426     QTC Calculation (Bezet) 06/01/2024 446     P Axis 06/01/2024 60     R Axis 06/01/2024 56     T Axis 06/01/2024 -28    Admission on 05/23/2024, Discharged on 05/24/2024   Component Date Value    POCT INFLUENZA A 05/24/2024 Negative     POCT INFLUENZA B 05/24/2024 Negative     Rapid SARS-CoV-2 by PCR 05/24/2024 Not Detected     XR CHEST PA + LAT CHEST (CPT=71046)    Result Date: 6/1/2024  PROCEDURE:  XR CHEST PA + LAT CHEST (CPT=71046)  INDICATIONS:  Pt seen here last week, dx with pneumonia but patient states he is feeling worse.  COMPARISON:  [facility], XR, XR CHEST AP PORTABLE  (CPT=71045), 5/24/2024, 0:57 AM.  TECHNIQUE:  PA and lateral chest radiographs were obtained.  PATIENT STATED HISTORY: (As transcribed by Technologist)  Patient states persistant cough, diagnosis of pneumonia last week.   FINDINGS:  Sternotomy and bypass changes.  The lungs are hyperinflated. The heart is mildly  increased in size.  The lungs are clear.  The costophrenic angles are sharp.  There is no active disease seen.            CONCLUSION:  Hyperinflated lungs. Otherwise, no active disease seen.  Postsurgical changes.   LOCATION:  [facility]   Dictated by (CST): Eliseo Da Silva MD on 6/01/2024 at 5:09 PM     Finalized by (CST): Eliseo Da Silva MD on 6/01/2024 at 5:09 PM       XR CHEST AP PORTABLE  (CPT=71045)    Result Date: 5/24/2024  PROCEDURE:  XR CHEST AP PORTABLE  (CPT=71045)  TECHNIQUE:  AP chest radiograph was obtained.  COMPARISON:  [facility] , XR, XR CHEST PA + LAT CHEST (CPT=71046), 12/20/2018, 3:10 PM.  INDICATIONS:  coughing x1 week, denies fever  PATIENT STATED HISTORY: (As transcribed by Technologist)  Pt complains of a productive cough with a runny nose,  coughing x1 week, denies fever.     FINDINGS:  Median sternotomy changes noted.  Stable fractured upper median sternotomy wire. Cardiomegaly with normal pulmonary vascularity.  Mild scarring/atelectasis in the lower lungs.  No significant pleural effusion or pneumothorax.             CONCLUSION:  No lobar pneumonia or overt congestive failure.  Mild scarring/atelectasis in the lower lungs.  A preliminary report was provided by the [facility] teleradiology service.  LOCATION:  [facility]      Dictated by (CST): Rudy Malone MD on 5/24/2024 at 7:42 AM     Finalized by (CST): Rudy Malone MD on 5/24/2024 at 7:42 AM        REVIEWED THIS VISIT  ASSESSMENT/PLAN:   73 year old male with    1. Bronchitis  - ASSAY QUANTITATIVE, GLUCOSE-117  \"Greatly improved\"-per patient-cough is 80 to 90% resolved and sleeping well  On prednisone 40 mg daily 4 of 5  Has albuterol as needed  No shortness of breath normal pulse ox lungs are clear.  There is no wheezing.  Has good inspiratory expiratory effort.  Subjectively patient feels overall significantly better.  Completed Zithromax  Clinically no evidence of pneumonia.  If cough worsens-former smoker may have tendencies towards COPD  exacerbations may need longer steroid taper.  Will hold on prescribing since type II diabetic can raise blood sugars.    2. DM (diabetes mellitus) type II, controlled, with peripheral vascular disorder (HCC)  - ASSAY QUANTITATIVE, GLUCOSE-117  Encourage patient to check Accu-Cheks at least once or twice a day especially while on prednisone as it can increase blood sugars call if greater than 300  Continue Ozempic and diabetic medications    3. Injury of left knee, initial encounter  Patient with no pain just some mild swelling of the knee after fall  Swelling will resolve with time  There is no symptoms or signs of internal derangement  If patient develops knee pain or knee is locking or giving out or worsening then needs to be reevaluated in the office or may see his orthopedic surgeon Dr. Lombardi.    Meds This Visit:  Requested Prescriptions      No prescriptions requested or ordered in this encounter       Health Maintenance:  Health Maintenance   Topic Date Due    COVID-19 Vaccine (7 - 2023-24 season) 02/18/2024    Diabetes Care A1C  09/15/2024    Diabetes Care Dilated Eye Exam  09/18/2024    Diabetes Care Foot Exam  09/19/2024    Colorectal Cancer Screening  01/14/2025    Diabetes Care: Microalb/Creat Ratio  03/15/2025    Diabetes Care: GFR  06/01/2025    PSA  09/20/2025    Influenza Vaccine  Completed    MA Annual Health Assessment  Completed    Annual Depression Screening  Completed    Fall Risk Screening (Annual)  Completed    Pneumococcal Vaccine: 65+ Years  Completed    Zoster Vaccines  Completed         Patient/Caregiver Education: Patient/Caregiver Education: There are no barriers to learning. Medical education done.   Outcome: Patient verbalizes understanding. Patient is notified to call with any questions, comp lications, allergies, or worsening or changing symptoms.  Patient is to call with any side effects or complications from the treatments as a result of today.     Problem List:     Patient  Active Problem List   Diagnosis    Erectile dysfunction    Coronary artery disease of native artery of native heart with stable angina pectoris (HCC)    BPH (benign prostatic hyperplasia)    S/P CABG (coronary artery bypass graft)    Bilateral atherosclerosis of legs (HCC)    Carotid artery disease (HCC)    Diverticulosis    Fatty liver    AAA (abdominal aortic aneurysm) (HCC)    Osteoarthritis of spine with radiculopathy, cervical region    Foraminal stenosis of cervical region    Facet arthritis of cervical region    DM (diabetes mellitus) type II, controlled, with peripheral vascular disorder (HCC)    Sensorineural hearing loss    KRISTEN on CPAP    Arthritis of lumbar spine    Hyperlipidemia, mixed    Tubular adenoma of colon    Martinez's esophagus determined by endoscopy    Lumbar degenerative disc disease    PAD (peripheral artery disease) (HCC)    Essential hypertension    Status post insertion of iliac artery stent    Aortoiliac occlusive disease (HCC)    BMI 33.0-33.9,adult    Chronic pain of right knee    Weight gain    Asymptomatic microscopic hematuria       Imaging & Referrals:  None     6/4/2024  Caitlin Samayoa, DO      Patient understands plan and follow-up.  Return in about 1 week (around 6/11/2024), or if symptoms worsen or fail to improve.

## 2024-08-06 ENCOUNTER — TELEPHONE (OUTPATIENT)
Dept: FAMILY MEDICINE CLINIC | Facility: CLINIC | Age: 74
End: 2024-08-06

## 2024-08-06 DIAGNOSIS — K22.70 BARRETT'S ESOPHAGUS DETERMINED BY ENDOSCOPY: Primary | ICD-10-CM

## 2024-08-12 ENCOUNTER — TELEPHONE (OUTPATIENT)
Dept: FAMILY MEDICINE CLINIC | Facility: CLINIC | Age: 74
End: 2024-08-12

## 2024-08-12 NOTE — TELEPHONE ENCOUNTER
Unable to reach patient for medication adherence consult. LVM for patient to call me back at [phone].

## 2024-08-13 NOTE — TELEPHONE ENCOUNTER
Reached patient for medication adherence consult. Patient overdue for refill on Ozempic per insurance report.    Patient reports that he is taking the medication as prescribed. He/she reports tolerating the medication well. He/she denies the need for refills at this time.    Provided education on importance of adherence. Addressed all patient questions and concerns with medication.

## 2024-08-15 ENCOUNTER — TELEPHONE (OUTPATIENT)
Dept: FAMILY MEDICINE CLINIC | Facility: CLINIC | Age: 74
End: 2024-08-15

## 2024-08-15 DIAGNOSIS — M25.562 ACUTE PAIN OF LEFT KNEE: Primary | ICD-10-CM

## 2024-08-15 NOTE — TELEPHONE ENCOUNTER
Spoke with patient his left knee continues to give him problems.  He is now having pain.   Last office visit 6/4/24  3. Injury of left knee, initial encounter  Patient with no pain just some mild swelling of the knee after fall  Swelling will resolve with time  There is no symptoms or signs of internal derangement  If patient develops knee pain or knee is locking or giving out or worsening then needs to be reevaluated in the office or may see his orthopedic surgeon Dr. Lombardi.    Patient would like ortho referral. Referral placed.

## 2024-08-15 NOTE — TELEPHONE ENCOUNTER
Pt called office stating that he can barely walk his knee is swollen and it hurts (L), pt states that he wants to know if he can get an ortho referral or needs to be seen.

## 2024-09-06 ENCOUNTER — TELEPHONE (OUTPATIENT)
Dept: FAMILY MEDICINE CLINIC | Facility: CLINIC | Age: 74
End: 2024-09-06

## 2024-09-06 DIAGNOSIS — H91.90 HEARING LOSS, UNSPECIFIED HEARING LOSS TYPE, UNSPECIFIED LATERALITY: ICD-10-CM

## 2024-09-06 DIAGNOSIS — E11.51 DM (DIABETES MELLITUS) TYPE II, CONTROLLED, WITH PERIPHERAL VASCULAR DISORDER (HCC): Primary | ICD-10-CM

## 2024-09-09 NOTE — TELEPHONE ENCOUNTER
Authorization received via fax. Referral dept notified. Left message on voicemail to call office back. Referral faxed to [facility] office @ [phone].    Patient informed by Tasha BERG that referral was approved and faxed. He did supply another fax number and Tasha faxed referral to that number as well.

## 2024-09-09 NOTE — TELEPHONE ENCOUNTER
Ok per Dr. Samayoa to place referral.  Marked as critical and referral department notified. Referral authorized. Patient notified.  Patient also reported he has an appointment this afternoon with audiologist Carly Peterson and needs referral.  Referral placed awaiting auth.  Referral department notified. Per Referral department: This Provider is showing OON. I submitted it and it may or may not be Approved. I can't guarantee they will reach a determination by this afternoon, so they may want to reschedule this appt. Patient was notified of this and advised to cancel today's appointment.  Once decision is made we will contact him. He expressed understanding.

## 2024-09-16 ENCOUNTER — LAB ENCOUNTER (OUTPATIENT)
Dept: LAB | Age: 74
End: 2024-09-16
Attending: FAMILY MEDICINE
Payer: MEDICARE

## 2024-09-16 DIAGNOSIS — E78.2 MIXED HYPERLIPIDEMIA: ICD-10-CM

## 2024-09-16 DIAGNOSIS — E11.51 DIABETES MELLITUS WITH PERIPHERAL CIRCULATORY DISORDER (HCC): Primary | ICD-10-CM

## 2024-09-16 DIAGNOSIS — I10 ESSENTIAL HYPERTENSION: ICD-10-CM

## 2024-09-16 LAB
ALBUMIN SERPL-MCNC: 4.4 G/DL (ref 3.2–4.8)
ALBUMIN/GLOB SERPL: 1.7 {RATIO} (ref 1–2)
ALP LIVER SERPL-CCNC: 59 U/L
ALT SERPL-CCNC: 41 U/L
ANION GAP SERPL CALC-SCNC: 7 MMOL/L (ref 0–18)
AST SERPL-CCNC: 26 U/L (ref ?–34)
BASOPHILS # BLD AUTO: 0.05 X10(3) UL (ref 0–0.2)
BASOPHILS NFR BLD AUTO: 0.6 %
BILIRUB SERPL-MCNC: 0.7 MG/DL (ref 0.2–1.1)
BUN BLD-MCNC: 16 MG/DL (ref 9–23)
CALCIUM BLD-MCNC: 9.9 MG/DL (ref 8.7–10.4)
CHLORIDE SERPL-SCNC: 106 MMOL/L (ref 98–112)
CHOLEST SERPL-MCNC: 129 MG/DL (ref ?–200)
CO2 SERPL-SCNC: 26 MMOL/L (ref 21–32)
CREAT BLD-MCNC: 0.97 MG/DL
EGFRCR SERPLBLD CKD-EPI 2021: 82 ML/MIN/1.73M2 (ref 60–?)
EOSINOPHIL # BLD AUTO: 0.19 X10(3) UL (ref 0–0.7)
EOSINOPHIL NFR BLD AUTO: 2.2 %
ERYTHROCYTE [DISTWIDTH] IN BLOOD BY AUTOMATED COUNT: 12.8 %
EST. AVERAGE GLUCOSE BLD GHB EST-MCNC: 123 MG/DL (ref 68–126)
FASTING PATIENT LIPID ANSWER: YES
FASTING STATUS PATIENT QL REPORTED: YES
GLOBULIN PLAS-MCNC: 2.6 G/DL (ref 2–3.5)
GLUCOSE BLD-MCNC: 98 MG/DL (ref 70–99)
HBA1C MFR BLD: 5.9 % (ref ?–5.7)
HCT VFR BLD AUTO: 48 %
HDLC SERPL-MCNC: 54 MG/DL (ref 40–59)
HGB BLD-MCNC: 16.1 G/DL
IMM GRANULOCYTES # BLD AUTO: 0.03 X10(3) UL (ref 0–1)
IMM GRANULOCYTES NFR BLD: 0.3 %
LDLC SERPL CALC-MCNC: 57 MG/DL (ref ?–100)
LYMPHOCYTES # BLD AUTO: 2.21 X10(3) UL (ref 1–4)
LYMPHOCYTES NFR BLD AUTO: 25.6 %
MCH RBC QN AUTO: 31.3 PG (ref 26–34)
MCHC RBC AUTO-ENTMCNC: 33.5 G/DL (ref 31–37)
MCV RBC AUTO: 93.2 FL
MONOCYTES # BLD AUTO: 0.77 X10(3) UL (ref 0.1–1)
MONOCYTES NFR BLD AUTO: 8.9 %
NEUTROPHILS # BLD AUTO: 5.37 X10 (3) UL (ref 1.5–7.7)
NEUTROPHILS # BLD AUTO: 5.37 X10(3) UL (ref 1.5–7.7)
NEUTROPHILS NFR BLD AUTO: 62.4 %
NONHDLC SERPL-MCNC: 75 MG/DL (ref ?–130)
OSMOLALITY SERPL CALC.SUM OF ELEC: 289 MOSM/KG (ref 275–295)
PLATELET # BLD AUTO: 205 10(3)UL (ref 150–450)
POTASSIUM SERPL-SCNC: 4.5 MMOL/L (ref 3.5–5.1)
PROT SERPL-MCNC: 7 G/DL (ref 5.7–8.2)
RBC # BLD AUTO: 5.15 X10(6)UL
SODIUM SERPL-SCNC: 139 MMOL/L (ref 136–145)
TRIGL SERPL-MCNC: 95 MG/DL (ref 30–149)
VLDLC SERPL CALC-MCNC: 14 MG/DL (ref 0–30)
WBC # BLD AUTO: 8.6 X10(3) UL (ref 4–11)

## 2024-09-16 PROCEDURE — 83036 HEMOGLOBIN GLYCOSYLATED A1C: CPT | Performed by: FAMILY MEDICINE

## 2024-09-16 PROCEDURE — 85025 COMPLETE CBC W/AUTO DIFF WBC: CPT

## 2024-09-16 PROCEDURE — 36415 COLL VENOUS BLD VENIPUNCTURE: CPT | Performed by: FAMILY MEDICINE

## 2024-09-16 PROCEDURE — 80061 LIPID PANEL: CPT | Performed by: FAMILY MEDICINE

## 2024-09-16 PROCEDURE — 80053 COMPREHEN METABOLIC PANEL: CPT | Performed by: FAMILY MEDICINE

## 2024-09-18 PROBLEM — M17.12 PRIMARY OSTEOARTHRITIS OF LEFT KNEE: Status: ACTIVE | Noted: 2024-08-21

## 2024-09-19 DIAGNOSIS — E11.51 DM (DIABETES MELLITUS) TYPE II, CONTROLLED, WITH PERIPHERAL VASCULAR DISORDER (HCC): ICD-10-CM

## 2024-09-20 RX ORDER — SEMAGLUTIDE 2.68 MG/ML
INJECTION, SOLUTION SUBCUTANEOUS
Qty: 3 EACH | Refills: 0 | Status: SHIPPED | OUTPATIENT
Start: 2024-09-20

## 2024-09-20 NOTE — TELEPHONE ENCOUNTER
Pt requesting refill of   Requested Prescriptions     Pending Prescriptions Disp Refills    OZEMPIC, 2 MG/DOSE, 8 MG/3ML Subcutaneous Solution Pen-injector [Pharmacy Med Name: Ozempic (2 MG/DOSE) Subcutaneous Solution Pen-injector 8 MG/3ML] 3 each 11     Sig: INJECT 2MG UNDER THE SKIN ONE TIME WEEKLY      Last Time Medication was Filled:  3/26/2024    Last Office Visit with Provider: 6/04/2024  DM (diabetes mellitus) type II, controlled, with peripheral vascular disorder (HCC)  - ASSAY QUANTITATIVE, GLUCOSE-117  Encourage patient to check Accu-Cheks at least once or twice a day especially while on prednisone as it can increase blood sugars call if greater than 300  Continue Ozempic and diabetic medications    Appt. scheduled on 9/24/2024

## 2024-09-24 ENCOUNTER — OFFICE VISIT (OUTPATIENT)
Dept: FAMILY MEDICINE CLINIC | Facility: CLINIC | Age: 74
End: 2024-09-24
Payer: MEDICARE

## 2024-09-24 ENCOUNTER — TELEPHONE (OUTPATIENT)
Dept: FAMILY MEDICINE CLINIC | Facility: CLINIC | Age: 74
End: 2024-09-24

## 2024-09-24 VITALS
TEMPERATURE: 97 F | BODY MASS INDEX: 34.33 KG/M2 | WEIGHT: 259 LBS | RESPIRATION RATE: 18 BRPM | OXYGEN SATURATION: 94 % | HEART RATE: 77 BPM | HEIGHT: 73 IN | DIASTOLIC BLOOD PRESSURE: 76 MMHG | SYSTOLIC BLOOD PRESSURE: 128 MMHG

## 2024-09-24 DIAGNOSIS — Z95.828 STATUS POST INSERTION OF ILIAC ARTERY STENT: ICD-10-CM

## 2024-09-24 DIAGNOSIS — E11.51 DM (DIABETES MELLITUS) TYPE II, CONTROLLED, WITH PERIPHERAL VASCULAR DISORDER (HCC): Primary | ICD-10-CM

## 2024-09-24 DIAGNOSIS — G47.33 OSA ON CPAP: ICD-10-CM

## 2024-09-24 DIAGNOSIS — N40.0 BENIGN PROSTATIC HYPERPLASIA WITHOUT LOWER URINARY TRACT SYMPTOMS: ICD-10-CM

## 2024-09-24 DIAGNOSIS — I10 ESSENTIAL HYPERTENSION: ICD-10-CM

## 2024-09-24 DIAGNOSIS — R63.5 WEIGHT GAIN: ICD-10-CM

## 2024-09-24 DIAGNOSIS — K22.70 BARRETT'S ESOPHAGUS DETERMINED BY ENDOSCOPY: ICD-10-CM

## 2024-09-24 DIAGNOSIS — I25.118 CORONARY ARTERY DISEASE OF NATIVE ARTERY OF NATIVE HEART WITH STABLE ANGINA PECTORIS (HCC): ICD-10-CM

## 2024-09-24 DIAGNOSIS — Z95.1 S/P CABG (CORONARY ARTERY BYPASS GRAFT): ICD-10-CM

## 2024-09-24 DIAGNOSIS — I77.9 BILATERAL CAROTID ARTERY DISEASE, UNSPECIFIED TYPE (HCC): ICD-10-CM

## 2024-09-24 DIAGNOSIS — N52.9 VASCULOGENIC ERECTILE DYSFUNCTION, UNSPECIFIED VASCULOGENIC ERECTILE DYSFUNCTION TYPE: ICD-10-CM

## 2024-09-24 DIAGNOSIS — K76.0 FATTY LIVER: ICD-10-CM

## 2024-09-24 DIAGNOSIS — Z23 NEED FOR VACCINATION: ICD-10-CM

## 2024-09-24 DIAGNOSIS — I70.203 BILATERAL ATHEROSCLEROSIS OF LEGS (HCC): ICD-10-CM

## 2024-09-24 DIAGNOSIS — I73.9 PAD (PERIPHERAL ARTERY DISEASE) (HCC): ICD-10-CM

## 2024-09-24 DIAGNOSIS — E78.2 HYPERLIPIDEMIA, MIXED: ICD-10-CM

## 2024-09-24 DIAGNOSIS — Z12.11 SCREEN FOR COLON CANCER: ICD-10-CM

## 2024-09-24 PROBLEM — J40 BRONCHITIS: Status: RESOLVED | Noted: 2024-06-04 | Resolved: 2024-09-24

## 2024-09-24 PROCEDURE — 3078F DIAST BP <80 MM HG: CPT | Performed by: FAMILY MEDICINE

## 2024-09-24 PROCEDURE — 99499 UNLISTED E&M SERVICE: CPT | Performed by: FAMILY MEDICINE

## 2024-09-24 PROCEDURE — 1160F RVW MEDS BY RX/DR IN RCRD: CPT | Performed by: FAMILY MEDICINE

## 2024-09-24 PROCEDURE — 99214 OFFICE O/P EST MOD 30 MIN: CPT | Performed by: FAMILY MEDICINE

## 2024-09-24 PROCEDURE — 1159F MED LIST DOCD IN RCRD: CPT | Performed by: FAMILY MEDICINE

## 2024-09-24 PROCEDURE — G2211 COMPLEX E/M VISIT ADD ON: HCPCS | Performed by: FAMILY MEDICINE

## 2024-09-24 PROCEDURE — 1170F FXNL STATUS ASSESSED: CPT | Performed by: FAMILY MEDICINE

## 2024-09-24 PROCEDURE — 3044F HG A1C LEVEL LT 7.0%: CPT | Performed by: FAMILY MEDICINE

## 2024-09-24 PROCEDURE — 3074F SYST BP LT 130 MM HG: CPT | Performed by: FAMILY MEDICINE

## 2024-09-24 PROCEDURE — 3008F BODY MASS INDEX DOCD: CPT | Performed by: FAMILY MEDICINE

## 2024-09-24 PROCEDURE — 3072F LOW RISK FOR RETINOPATHY: CPT | Performed by: FAMILY MEDICINE

## 2024-09-24 PROCEDURE — 1126F AMNT PAIN NOTED NONE PRSNT: CPT | Performed by: FAMILY MEDICINE

## 2024-09-24 NOTE — PROGRESS NOTES
CHIEF COMPLAINT:   Chief Complaint   Patient presents with    Follow - Up     6 months DM,discuss labs results      HPI:     Jatinder Ennis is a 74 year old male presents for medication monitoring- and discuss labs. type II by diabetes/CAD-on Ozempic to help diabetes and facilitate weight loss. Tolerating well ozempic 2mg weekly.. PA program for ozempic denies medication side effects.   Helps appetite.  Denies any abdominal pain or acid reflux.   Fasting blood sugars-100's occasionally checks.  Does not check postprandial.  Diet: Admits at times poor, tries to eat lower carb but often cheats  Exercise: Walking daily..  Moved to senior home and can have blood work at [facility] in building.  Weight gain 9 pounds since 6/2024  Last A1c:  HEMOGLOBIN A1C   Date Value   03/17/2023 5.6 %   12/07/2022 5.6   02/15/2018 5.9 % (A)   05/16/2013 6.1 % (A)     HEMOGLOBIN A1c (% of total Hgb)   Date Value   09/20/2023 5.6   09/09/2021 5.9 (H)   06/02/2021 6.0 (H)     HgbA1C (%)   Date Value   09/16/2024 5.9 (H)   03/15/2024 6.3 (H)   08/30/2022 5.8 (H)   Last diabetic eye exam 9/09/2024-Dr. Oppenheim no D BR  Last urine microalbumin 3/15/2024-normal  Denies any burning or numbness in his feet.  Checks feet daily  Denies any issues with depression.  Feels very hopeful.    Patient presents for recheck of hypertension. Pt has been taking medications as instructed, no medication side effects, no home BP monitoring.  Denies headache, dizziness, new chest pain, can have mild SIGALA with activity that comes and goes and is attributed to a partial blockage in his heart.  This is actually gotten better and not worsening.  BP Readings from Last 5 Encounters:   09/24/24 128/76   06/04/24 130/76   06/01/24 105/56   05/23/24 129/60   03/26/24 132/60     Pt presents for recheck of hyperlipidemia. Patient reports taking medications as instructed, no medication side effects noted. Denies any generalized muscle aches.    History of CAD/  atherosclerosis has unstented area of heart can cause some shortness of breath with activity and pain. Recent visit 9/28/2024  with - told \"things are excellent.\" had leg scan 2022 and told some calcifications and claudication has resolved.  Cardiologist monitoring flow in legs and will monitor.  \"Scan results are not available in epic\".  Patient has cramping in his calves walks up to 2 miles a day.  History of chest pain that stops with sitting.  Notices if lies on the left side at night-occasionally has chest pain and if he rolls over onto his back or right side the pain will resolve-states discussed with cardiologist and not concerned.  Nonradiating.  Symptoms have not increased.  Patient states his partial blockage that is not \"fixable\".  Does not have nitroglycerin    History of Martinez's esophagus :.  Denying GERD or acid reflux symptoms.  Taking omeprazole 40 mg.  EGD 1/2020 20 with Dr. Stokes at [facility] findings with gastritis and erosion recommended recall in 3 years.  Attributed gastritis and ulcers due to aspirin and Plavix use.  LOV with GI LAURA Summers, 3/20/2022    Obstructive sleep apnea on CPAP-recently had appointment with pulmonolary NP-Surekha Joshi states controlled-patient is very compliant states \"sleeps better with CPAP\". Needs referral for pulmonologist LOV with pulmonary 1/29/2024.    History of claudication and hip pain- walking more and restarted daily exercise 15min. Denies claudication.    Complains of arthritis in all joints. Uses plain tylenol= wants to know if other options.  Does not want to take NSAIDs.    Wt Readings from Last 6 Encounters:   09/24/24 259 lb (117.5 kg)   06/04/24 257 lb 12.8 oz (116.9 kg)   06/01/24 250 lb (113.4 kg)   05/23/24 250 lb (113.4 kg)   03/26/24 266 lb (120.7 kg)   01/04/24 253 lb (114.8 kg)       HISTORY:  Past Medical History:    AAA (abdominal aortic aneurysm) ([facility])    Arthritis    Atherosclerosis of coronary artery     Back pain    Martinez's esophagus determined by endoscopy    AIS-Leuinufv-[id number], biopsies positive for Martinez's -aspirin decreased to 81 mg from 325 mg at last endoscopy in 2020.  Repeat endoscopy in 3 years-scheduled EGD 4/19/2023    Bilateral hip pain    Bleeding nose    BPH (benign prostatic hyperplasia)    Calculus of kidney    Carotid stenosis    mild    Cataract    Cataract, left    already had R eye update 2016    Cervical disc herniation    Change in hair    Started going grey.    Chest pain on exertion    Drs could not get stint put in    Congestive heart disease (HCC)    Coronary atherosclerosis    COVID-19 virus detected    patient has loss of taste and smell    Diastasis recti    ventral    Disorder of prostate    Diverticulosis    Easy bruising    Erythrocytosis    Frequent urination    Hearing impairment    bilateral hearing aids    Hearing loss    Heart attack (HCC)    Heel spur, left    Hemorrhoids    High blood pressure    High cholesterol    History of kidney stones    Hyperlipidemia    Obesity (BMI 30-39.9)    Obstructive sleep apnea (adult) (pediatric)    AHI 35.8 autoPAP 10-20 HME    KRISTEN on CPAP    Osteoarthrosis, unspecified whether generalized or localized, unspecified site    Pain in joints    Peripheral vascular disease (HCC)    Personal history of other diseases of circulatory system    CAD    Plantar fasciitis    Polycythemia    History of polycythemia due to obstructive sleep apnea and followed previously by Dr. Celaya.  Denies headache or abdominal pain.  Denies bruising.  Last CBC 1/15/2019 is normal.  Recommend annual CBC by hematology.    PVD (peripheral vascular disease) (HCC)    On screening 2014 legs erum    S/P CABG (coronary artery bypass graft)    Shortness of breath    with exertion    Sleep apnea    Stented coronary artery    Not in heart, off of aorta to both legs    Trochanteric bursitis of right hip    Type II or unspecified type diabetes mellitus without mention of  complication, not stated as uncontrolled      Past Surgical History:   Procedure Laterality Date    Angiogram      stents in aorta    Angioplasty (coronary)      Cabg  2003    X 4 vessels    Cataract  2018    Right eye-Dr. Oppenheim    Cataract      Cath bare metal stent (bms)      Cholecystectomy      Colonoscopy  2003    Colonoscopy N/A 2015    Procedure: COLONOSCOPY;  Surgeon: Bayron Barlow MD;  Location:  ENDOSCOPY    Colonoscopy N/A 2020    Procedure: COLONOSCOPY;  Surgeon: Alberto Stokes DO;  Location:  ENDOSCOPY    Colonoscopy N/A 2021    Procedure: COLONOSCOPY;  Surgeon: Alberto Stokes DO;  Location:  ENDOSCOPY    Colonoscopy N/A 2022    Procedure: COLONOSCOPY with colds snare polypectomy;  Surgeon: Alberto Stokes DO;  Location:  ENDOSCOPY    Cystoscopy,insert ureteral stent  2014    Other surgical history      passed kidney stone via stent    Tonsillectomy      Vasectomy        Family History   Problem Relation Age of Onset    Arthritis Father     Cancer Father     Heart Disorder Father     Heart Attack Father     Other (CAD) Father     Other (lung cancer) Father     Arthritis Mother     Diabetes Mother     Other (diabetes mellitus) Mother       Social History:   Social History     Socioeconomic History    Marital status:    Tobacco Use    Smoking status: Former     Current packs/day: 0.00     Average packs/day: 1 pack/day for 40.0 years (40.0 ttl pk-yrs)     Types: Cigarettes     Start date: 1967     Quit date: 2003     Years since quittin.4    Smokeless tobacco: Never   Vaping Use    Vaping status: Never Used   Substance and Sexual Activity    Alcohol use: Yes     Alcohol/week: 4.0 standard drinks of alcohol     Types: 2 Glasses of wine, 1 Cans of beer, 1 Shots of liquor per week     Comment: Occasional    Drug use: Never    Sexual activity: Yes   Other Topics Concern    Caffeine Concern No    Stress Concern No    Weight Concern  Yes    Special Diet No    Exercise Yes    Seat Belt No     Social Determinants of Health     Physical Activity: Sufficiently Active (3/1/2021)    [facility] [place]    Exercise Vital Sign     Days of Exercise per Week: 5 days     Minutes of Exercise per Session: 30 min        Medications (Active prior to today's visit):  Current Outpatient Medications   Medication Sig Dispense Refill    semaglutide (OZEMPIC, 2 MG/DOSE,) 8 MG/3ML Subcutaneous Solution Pen-injector INJECT 2MG UNDER THE SKIN ONE TIME WEEKLY 3 each 0    PANTOPRAZOLE 40 MG Oral Tab EC TAKE 1 TABLET EVERY DAY 90 tablet 1    lisinopril 10 MG Oral Tab Take 1 tablet (10 mg total) by mouth daily. 90 tablet 1    rosuvastatin 40 MG Oral Tab Take 1 tablet (40 mg total) by mouth nightly. 90 tablet 1    ezetimibe 10 MG Oral Tab Take 1 tablet (10 mg total) by mouth nightly. 90 tablet 1    amLODIPine 5 MG Oral Tab Take 1 tablet (5 mg total) by mouth daily. 90 tablet 1    metoprolol succinate ER 50 MG Oral Tablet 24 Hr Take 1 tablet (50 mg total) by mouth daily. 90 tablet 3    tamsulosin 0.4 MG Oral Cap Take 2 capsules (0.8 mg total) by mouth daily. 180 capsule 0    nitroglycerin 0.4 MG Sublingual SL Tab TAKE 1 TABLET SUBLINGUAL ONCE A DAY AS NEEDED FOR CHEST PAIN      Acetaminophen ER (TYLENOL 8 HOUR ARTHRITIS PAIN) 650 MG Oral Tab CR Take 2 tablets (1,300 mg total) by mouth every 8 (eight) hours as needed for Pain (Max is 6 tablets in 24 hours or 4000 mg of Tylenol/acetaminophen in 24 hours). 180 tablet 1    diclofenac 1 % External Gel Apply 2 g topically 4 (four) times daily. Buy otc 150 g 1    sildenafil 20 MG Oral Tab TAKE 5 TABLETS BY MOUTH 1 HOUR BEFORE SEXUAL ACTIVITY. DO NOT EXCEED 5 TABLETS PER 24 HOURS      Vitamin D3, Cholecalciferol, 50 MCG (2000 UT) Oral Tab vitamin D3  2000 IUs take 1 tablet daily 365 tablet 0    Clopidogrel Bisulfate 75 MG Oral Tab Take 1 tablet (75 mg total) by mouth daily. 30 tablet 0     aspirin 81 MG Oral Tab EC Take 1 tablet (81 mg total) by mouth daily.         Allergies:  Allergies   Allergen Reactions    Adhesive Tape ITCHING     Redness/itching EKG stickers       PSFH elements reviewed from today and agreed except as otherwise stated in HPI.  PHYSICAL EXAM:   /76 (BP Location: Right arm, Patient Position: Sitting, Cuff Size: large)   Pulse 77   Temp 97.4 °F (36.3 °C) (Temporal)   Resp 18   Ht 6' 1\" (1.854 m)   Wt 259 lb (117.5 kg)   SpO2 94%   BMI 34.17 kg/m²   Vital signs reviewed.Appears stated age, well groomed.  Physical Exam   GENERAL: well developed, well nourished,in no apparent distress  EYES; PERRLA, EOM-i B/L. Sclera clear and non icteric bilateral  SKIN: no rashes,no suspicious lesions  HEENT: atraumatic, normocephalic,ears clear bilateral  NECK: supple,no adenopathy,no bruits, no JVD  LUNGS: clear to auscultation bilateral. No RRW. Good inspiratory and expiratory effort  CARDIO: RRR without murmur, clear S1, S2  VS: no carotid artery or abdominal aorta bruit,    GI: good BS's,no masses,No HSM or tenderness.   EXTREMITIES: no cyanosis, clubbing or edema, bilateral. No calf tenderness.  Good range of motion of left knee.  Tenderness is only medially.  NEURO: Alert and oriented x3 walking without difficulty-no limp    LABS         [facility] Lab Encounter on 09/16/2024   Component Date Value    WBC 09/16/2024 8.6     RBC 09/16/2024 5.15     HGB 09/16/2024 16.1     HCT 09/16/2024 48.0     PLT 09/16/2024 205.0     MCV 09/16/2024 93.2     MCH 09/16/2024 31.3     MCHC 09/16/2024 33.5     RDW 09/16/2024 12.8     Neutrophil Absolute Prel* 09/16/2024 5.37     Neutrophil Absolute 09/16/2024 5.37     Lymphocyte Absolute 09/16/2024 2.21     Monocyte Absolute 09/16/2024 0.77     Eosinophil Absolute 09/16/2024 0.19     Basophil Absolute 09/16/2024 0.05     Immature Granulocyte Abs* 09/16/2024 0.03     Neutrophil % 09/16/2024 62.4     Lymphocyte % 09/16/2024 25.6     Monocyte % 09/16/2024  8.9     Eosinophil % 09/16/2024 2.2     Basophil % 09/16/2024 0.6     Immature Granulocyte % 09/16/2024 0.3      Office Visit on 03/26/2024   Component Date Value    Cholesterol, Total 09/16/2024 129     HDL Cholesterol 09/16/2024 54     Triglycerides 09/16/2024 95     LDL Cholesterol 09/16/2024 57     VLDL 09/16/2024 14     Non HDL Chol 09/16/2024 75     Patient Fasting for Lipi* 09/16/2024 Yes     Glucose 09/16/2024 98     Sodium 09/16/2024 139     Potassium 09/16/2024 4.5     Chloride 09/16/2024 106     CO2 09/16/2024 26.0     Anion Gap 09/16/2024 7     BUN 09/16/2024 16     Creatinine 09/16/2024 0.97     Calcium, Total 09/16/2024 9.9     Calculated Osmolality 09/16/2024 289     eGFR-Cr 09/16/2024 82     AST 09/16/2024 26     ALT 09/16/2024 41     Alkaline Phosphatase 09/16/2024 59     Bilirubin, Total 09/16/2024 0.7     Total Protein 09/16/2024 7.0     Albumin 09/16/2024 4.4     Globulin  09/16/2024 2.6     A/G Ratio 09/16/2024 1.7     Patient Fasting for CMP? 09/16/2024 Yes     HgbA1C 09/16/2024 5.9 (H)     Estimated Average Glucose 09/16/2024 123           REVIEWED THIS VISIT  ASSESSMENT/PLAN:   74 year old male with    1. DM (diabetes mellitus) type II, controlled, with peripheral vascular disorder (HCC)  - Lipid Panel; Future  - Hemoglobin A1C; Future  - Comp Metabolic Panel (14); Future  - MICROALB/CREAT RATIO, RANDOM URINE [6517] [Q]; Future  - Lipid Panel  - Hemoglobin A1C  - Comp Metabolic Panel (14)  - MICROALB/CREAT RATIO, RANDOM URINE [6517] [Q]  Controlled-A1c normal on Ozempic  Continue RX semaglutide, 2 mg weekly-and  PA program.  Off metformin  Diarrhea resolved  Continue daily foot exams  contintue regular exercise 3x weekly or more for 30 mins  Last ophthalmologist DM exam  Dr.Oppenheim-9//9/2024 required annual  Immunizations-encouraged vaccination with new COVID booster vaccine, RSV vaccine, flu shot  Urine microalbumin annual-9/24/2024  Foot exam annually.  If any sores or lesions  will call office immediately  Repeat labs HgA1c, lipids, CMP in 3 months    2. Essential hypertension  - Comp Metabolic Panel (14); Future  - CBC [6399] [Q]; Future  - Comp Metabolic Panel (14)  - CBC [6399] [Q]  - Comp Metabolic Panel (14)  Controlled  Encouraged 150-300 mins aerobic exercise weekly  <2g Na daily and Mediterranean diet  Weight loss if overweight  Home b/p monitoring 5x weekly goal <130/80, bring readings and cuff to each visit.  Continue current medication-metoprolol, lisinopril and amlodipine  Labs q 6 months    3. Hyperlipidemia, mixed  - Lipid Panel; Future  - Comp Metabolic Panel (14); Future  - Lipid Panel  - Comp Metabolic Panel (14)  Controlled  Continue rosuvastatin 40 mg and ezetimibe 10 mg  encourage mediterranean diet  Exercise brisk walk 30-60 mins at least 5 days weekly  Lose weight if overweight  Recheck fasting labs-every 6 months then OV    4. PAD (peripheral artery disease) (HCC)  - Lipid Panel; Future  - Lipid Panel  - Cardio Referral - Internal  Continue controlling blood pressure, cholesterol and hypertension  Encouraged walking at least 30 minutes daily  Continue ASA, rosuvastatin, clopidogrel    5. S/P CABG (coronary artery bypass graft)  6. Coronary artery disease of native artery of native heart with stable angina pectoris (HCC)  - Lipid Panel; Future  - Lipid Panel  - Cardio Referral - Internal  Symptoms at baseline has chest pain with lying on right side or occasionally with exertion.  Not treatable per patient so we will manage medically per cardiology  Continue controlling blood pressure, cholesterol and hypertension  Encouraged walking at least 30 minutes daily  Encouraged weight loss  Continue Plavix, ASA, rosuvastatin/ezetimibe, nitroglycerin as needed, metoprolol  Follow-up with cardiology every 6 months as recommended-needs referral      7. Bilateral carotid artery disease, unspecified type (HCC)  - Lipid Panel; Future  - Lipid Panel  - Cardio Referral -  Internal  Continue Plavix, ASA, rosuvastatin, ezetimibe  Monitoring per cardiology    8. Bilateral atherosclerosis of legs (HCC)  9. Status post insertion of iliac artery stent  - Lipid Panel; Future  - Lipid Panel  - Cardio Referral - Internal  No claudication  Walking several days a week      10. KRISTEN on CPAP  - Pulmonary Referral - In Network  Compliant with CPAP  Schedule annual follow-up with pulmonary ASAP    11. Martinez's esophagus determined by endoscopy  - [facility]STRO - INTERNAL  Overdue for EGD by 1 year was due 2023 needs to schedule ASAP    12. Fatty liver  - Comp Metabolic Panel (14); Future  - GASTRO - INTERNAL  - Comp Metabolic Panel (14)  Encouraged to least 10% of current weight which is 25 pounds in the next year to 18 months this would reduce the risk of developing prediabetic/insulin resistance.  Long-term fatty liver can lead to cirrhosis  Patient understands    13. Benign prostatic hyperplasia without lower urinary tract symptoms  - Urology Referral - In Network    14. Vasculogenic erectile dysfunction, unspecified vasculogenic erectile dysfunction type  - Urology Referral - In Network  Due for checkup with urology  Continue    15. Weight gain  - TSH W REFLEX TO FREE T4 [06278][Q]; Future  - TSH W REFLEX TO FREE T4 [29903][Q]    16. Screen for colon cancer  - GASTRO - INTERNAL  Overdue by 1 year    17. Need for vaccination  - Tetanus-Diphth-Acell Pertussis 5-2-15.5 LF-MCG/0.5 Intramuscular Suspension; Inject 0.5 mL into the muscle once for 1 dose.  Dispense: 0.5 mL; Refill: 0      Meds This Visit:  Requested Prescriptions     Signed Prescriptions Disp Refills    Tetanus-Diphth-Acell Pertussis 5-2-15.5 LF-MCG/0.5 Intramuscular Suspension 0.5 mL 0     Sig: Inject 0.5 mL into the muscle once for 1 dose.       Health Maintenance:  Zoster Vaccines(1 of 2) Never done  COVID-19 Vaccine(4 - Booster for Pfizer series) due on 03/22/2022  Diabetes Care: Microalb/Creat Ratio due on 06/02/2022  Diabetes Care  Dilated Eye Exam due on 09/08/2022  Diabetes Care A1C due on 08/22/2022  LDL Control due on 02/22/2023  Diabetes Care: GFR due on 02/22/2023  Fall Risk Screening due on 03/08/2023  Annual Depression Screen due on 03/08/2023  Annual Wellness Visit due on 03/08/2023  PSA due on 09/09/2023  Colorectal Cancer Screening due on 01/14/2025  Influenza Vaccine Completed  Pneumococcal Vaccine: 65+ Years Completed      Patient/Caregiver Education: Patient/Caregiver Education: There are no barriers to learning. Medical education done.   Outcome: Patient verbalizes understanding. Patient is notified to call with any questions, comp lications, allergies, or worsening or changing symptoms.  Patient is to call with any side effects or complications from the treatments as a result of today.     Problem List:     Patient Active Problem List   Diagnosis    Erectile dysfunction    Coronary artery disease of native artery of native heart with stable angina pectoris (HCC)    BPH (benign prostatic hyperplasia)    S/P CABG (coronary artery bypass graft)    Bilateral atherosclerosis of legs (HCC)    Carotid artery disease (HCC)    Diverticulosis    Fatty liver    AAA (abdominal aortic aneurysm) (HCC)    Osteoarthritis of spine with radiculopathy, cervical region    Foraminal stenosis of cervical region    Facet arthritis of cervical region    DM (diabetes mellitus) type II, controlled, with peripheral vascular disorder (HCC)    Sensorineural hearing loss    KRISTEN on CPAP    Arthritis of lumbar spine    Hyperlipidemia, mixed    Tubular adenoma of colon    Martinez's esophagus determined by endoscopy    Lumbar degenerative disc disease    PAD (peripheral artery disease) (HCC)    Essential hypertension    Status post insertion of iliac artery stent    Aortoiliac occlusive disease (HCC)    BMI 33.0-33.9,adult    Chronic pain of right knee    Weight gain    Asymptomatic microscopic hematuria    Bronchitis    Primary osteoarthritis of left knee        Imaging & Referrals:  GASTRO - INTERNAL     6/14/2022  Caitlin Samayoa, DO      Patient understands plan and follow-up.  Return in about 6 months (around 3/24/2025) for medicare physical, discuss labs, sooner if needed..

## 2024-09-24 NOTE — TELEPHONE ENCOUNTER
Patient signed HIPAA medical records authorization form for the Facility identified below to disclose patient health information to [facility]:     Facility / Provider Name: Robert A Oppenheim, M.D.  Facility Address: [address] [place] [id number]  Facility Phone: [phone]  Facility Fax:  [phone]    Specific PHI to be disclosed: diabetes dilated eye exam     Medical records timeframe: all and any     Medical Records Request/Authorization form has been faxed to above Facility/Provider.   Received fax confirmation.   MR Authorization form has also been sent to scanning.

## 2024-10-07 DIAGNOSIS — E11.51 DM (DIABETES MELLITUS) TYPE II, CONTROLLED, WITH PERIPHERAL VASCULAR DISORDER (HCC): ICD-10-CM

## 2024-10-07 DIAGNOSIS — I10 ESSENTIAL HYPERTENSION: ICD-10-CM

## 2024-10-10 RX ORDER — LISINOPRIL 10 MG/1
10 TABLET ORAL DAILY
Qty: 90 TABLET | Refills: 0 | Status: SHIPPED | OUTPATIENT
Start: 2024-10-10

## 2024-10-10 NOTE — TELEPHONE ENCOUNTER
Pt requesting refill of   Requested Prescriptions     Pending Prescriptions Disp Refills    lisinopril 10 MG Oral Tab [Pharmacy Med Name: Lisinopril Oral Tablet 10 MG] 90 tablet 0     Sig: TAKE 1 TABLET EVERY DAY     Last Time Medication was Filled:  3/26/2024    Last Office Visit with Provider: 9/24/2024  Essential hypertension  - Comp Metabolic Panel (14); Future  - CBC [6399] [Q]; Future  - Comp Metabolic Panel (14)  - CBC [6399] [Q]    No future appointments.

## 2024-10-15 ENCOUNTER — APPOINTMENT (OUTPATIENT)
Dept: GENERAL RADIOLOGY | Age: 74
End: 2024-10-15
Attending: PHYSICIAN ASSISTANT
Payer: MEDICARE

## 2024-10-15 ENCOUNTER — HOSPITAL ENCOUNTER (OUTPATIENT)
Age: 74
Discharge: HOME | End: 2024-10-15
Payer: MEDICARE

## 2024-10-15 VITALS
TEMPERATURE: 98 F | DIASTOLIC BLOOD PRESSURE: 81 MMHG | SYSTOLIC BLOOD PRESSURE: 139 MMHG | RESPIRATION RATE: 20 BRPM | OXYGEN SATURATION: 98 % | HEART RATE: 65 BPM | BODY MASS INDEX: 33 KG/M2 | WEIGHT: 250 LBS
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
## 2024-10-15 PROCEDURE — 73030 X-RAY EXAM OF SHOULDER: CPT | Performed by: PHYSICIAN ASSISTANT

## 2024-10-15 PROCEDURE — 99204 OFFICE O/P NEW MOD 45 MIN: CPT | Performed by: PHYSICIAN ASSISTANT

## 2024-10-15 PROCEDURE — 73060 X-RAY EXAM OF HUMERUS: CPT | Performed by: PHYSICIAN ASSISTANT

## 2024-10-15 PROCEDURE — 73560 X-RAY EXAM OF KNEE 1 OR 2: CPT | Performed by: PHYSICIAN ASSISTANT

## 2024-10-15 NOTE — ED PROVIDER NOTES
Patient Seen in: [facility]      History     Chief Complaint   Patient presents with    Arm Injury     Stated Complaint: left arm pain from elbow to shoulder    Subjective:   HPI      75 YO male with below stated past medical history presents to immediate care with wife for evaluation of left shoulder, left upper arm and left knee pain after trip and fall 2 days ago.  Patient states he braced fall with both hands outstretched and landed on both knees.  Reports chronic left knee pain for which he follows Orthopedics. Noticed mild left knee swelling immediately after injury that has resolved.  He denies chest pain or SOB.  Denies hitting his head or LOC.         Objective:     Past Medical History:    AAA (abdominal aortic aneurysm) (HCC)    Arthritis    Atherosclerosis of coronary artery    Back pain    Martinez's esophagus determined by endoscopy    FWK-Xgwctkan-owcuzhdfu, biopsies positive for Martinez's -aspirin decreased to 81 mg from 325 mg at last endoscopy in 2020.  Repeat endoscopy in 3 years-scheduled EGD 4/19/2023    Bilateral hip pain    Bleeding nose    BPH (benign prostatic hyperplasia)    Calculus of kidney    Carotid stenosis    mild    Cataract    Cataract, left    already had R eye update 2016    Cervical disc herniation    Change in hair    Started going grey.    Chest pain on exertion    Drs could not get stint put in    Congestive heart disease (HCC)    Coronary atherosclerosis    COVID-19 virus detected    patient has loss of taste and smell    Diastasis recti    ventral    Disorder of prostate    Diverticulosis    Easy bruising    Erythrocytosis    Frequent urination    Hearing impairment    bilateral hearing aids    Hearing loss    Heart attack (HCC)    Heel spur, left    Hemorrhoids    High blood pressure    High cholesterol    History of kidney stones    Hyperlipidemia    Obesity (BMI 30-39.9)    Obstructive sleep apnea (adult) (pediatric)    AHI 35.8 autoPAP 10-20 HME    KRISTEN on  CPAP    Osteoarthrosis, unspecified whether generalized or localized, unspecified site    Pain in joints    Peripheral vascular disease (HCC)    Personal history of other diseases of circulatory system    CAD    Plantar fasciitis    Polycythemia    History of polycythemia due to obstructive sleep apnea and followed previously by Dr. Celaya.  Denies headache or abdominal pain.  Denies bruising.  Last CBC 1/15/2019 is normal.  Recommend annual CBC by hematology.    PVD (peripheral vascular disease) (HCC)    On screening 2014 legs erum    S/P CABG (coronary artery bypass graft)    Shortness of breath    with exertion    Sleep apnea    Stented coronary artery    Not in heart, off of aorta to both legs    Trochanteric bursitis of right hip    Type II or unspecified type diabetes mellitus without mention of complication, not stated as uncontrolled              Past Surgical History:   Procedure Laterality Date    Angiogram      stents in aorta    Angioplasty (coronary)      Cabg  2003    X 4 vessels    Cataract  2018    Right eye-Dr. Oppenheim    Cataract      Cath bare metal stent (bms)      Cholecystectomy  2003    Colonoscopy  2003    Colonoscopy N/A 01/30/2015    Procedure: COLONOSCOPY;  Surgeon: Bayron Barlow MD;  Location:  ENDOSCOPY    Colonoscopy N/A 01/17/2020    Procedure: COLONOSCOPY;  Surgeon: Alberto Stokes DO;  Location:  ENDOSCOPY    Colonoscopy N/A 03/03/2021    Procedure: COLONOSCOPY;  Surgeon: Alberto Stokes DO;  Location:  ENDOSCOPY    Colonoscopy N/A 01/14/2022    Procedure: COLONOSCOPY with colds snare polypectomy;  Surgeon: Alberto Stokes DO;  Location:  ENDOSCOPY    Cystoscopy,insert ureteral stent  12/2014    Other surgical history  2014    passed kidney stone via stent    Tonsillectomy      Vasectomy                  Social History     Socioeconomic History    Marital status:    Tobacco Use    Smoking status: Former     Current packs/day: 0.00     Average packs/day: 1  pack/day for 40.0 years (40.0 ttl pk-yrs)     Types: Cigarettes     Start date: 1967     Quit date: 2003     Years since quittin.4    Smokeless tobacco: Never   Vaping Use    Vaping status: Never Used   Substance and Sexual Activity    Alcohol use: Yes     Alcohol/week: 4.0 standard drinks of alcohol     Types: 2 Glasses of wine, 1 Cans of beer, 1 Shots of liquor per week     Comment: Occasional    Drug use: Never    Sexual activity: Yes   Other Topics Concern    Caffeine Concern No    Stress Concern No    Weight Concern Yes    Special Diet No    Exercise Yes    Seat Belt No     Social Drivers of Health     Physical Activity: Sufficiently Active (3/1/2021)    [facility] from [facility]    Exercise Vital Sign     Days of Exercise per Week: 5 days     Minutes of Exercise per Session: 30 min              Review of Systems    Positive for stated complaint: left arm pain from elbow to shoulder  Other systems are as noted in HPI.  Constitutional and vital signs reviewed.      All other systems reviewed and negative except as noted above.    Physical Exam     ED Triage Vitals [10/15/24 1724]   /81   Pulse 65   Resp 20   Temp 98.4 °F (36.9 °C)   Temp src Temporal   SpO2 98 %   O2 Device None (Room air)       Current Vitals:   No data recorded      Physical Exam  Vitals and nursing note reviewed.   Constitutional:       General: He is not in acute distress.     Appearance: Normal appearance. He is not ill-appearing, toxic-appearing or diaphoretic.   HENT:      Head: Normocephalic and atraumatic.   Cardiovascular:      Rate and Rhythm: Normal rate.   Pulmonary:      Effort: Pulmonary effort is normal. No respiratory distress.      Breath sounds: Normal breath sounds.   Musculoskeletal:      Left shoulder: Tenderness (generalized tenderness) present. No deformity. Normal range of motion.      Left upper arm: Tenderness (mild diffuse tenderness to upper arm) present.       Left knee: No swelling. Tenderness (mild tenderness with palpation to anterior knee) present.   Skin:     Findings: Bruising (brusing to upper arm) present.   Neurological:      Mental Status: He is alert and oriented to person, place, and time.   Psychiatric:         Behavior: Behavior normal.         ED Course   Labs Reviewed - No data to display  XR HUMERUS (MIN 2 VIEWS), LEFT (CPT=73060)    Result Date: 10/15/2024  PROCEDURE:  XR HUMERUS (MIN 2 VIEWS), LEFT (CPT=73060)  INDICATIONS:  left arm pain from elbow to shoulder  COMPARISON:  [facility], XR, XR SHOULDER, COMPLETE (MIN 2 VIEWS), LEFT (CPT=73030), 10/15/2024, 6:16 PM.  PATIENT STATED HISTORY: (As transcribed by Technologist)  The patient fell onto his left side five days ago. Bruising and pain to the mid left humerus. Unable to move his left arm. Left knee pain.    FINDINGS:  BONES:  There is focal cortical thickening seen along the lateral aspect of the proximal to mid shaft of the left humerus.  This could reflect changes related to remote injury at the insertion site of the deltoid.  No acute fracture seen.  No subluxation  or dislocation.  The glenohumeral joint space is maintained. SOFT TISSUES:  No focal soft tissue swelling. EFFUSION:   None visible.            CONCLUSION:  There is focal cortical thickening and slight irregularity at the proximal to mid diaphysis of the lateral aspect of the humerus.  This could reflect remote injury changes at the insertion site of the deltoid.  No acute fracture seen.  If there is focal pain in this location then follow-up bone scan or MRI may aid in further evaluation.   LOCATION:  [place]   Dictated by (CST): Eddie Cruz MD on 10/15/2024 at 7:10 PM     Finalized by (CST): Eddie Cruz MD on 10/15/2024 at 7:18 PM       XR KNEE (1 OR 2 VIEWS), LEFT (CPT=73560)    Result Date: 10/15/2024  PROCEDURE:  XR KNEE (1 OR 2 VIEWS), LEFT (CPT=73560)  COMPARISON:  None.  INDICATIONS:  left arm  pain from elbow to shoulder  PATIENT STATED HISTORY: (As transcribed by Technologist)  The patient fell onto his left side five days ago. Bruising and pain to the mid left humerus. Unable to move his left arm. Left knee pain.    FINDINGS:  Moderate to severe medial compartment space narrowing is present with subchondral sclerosis.  Osteophyte formation noted over the medial tibial plateau.  No joint effusion is seen.  Prominent vascular calcifications are noted.  No acute fracture is seen.            CONCLUSION:  See above.   LOCATION:  [facility]   Dictated by (CST): Roland Randall MD on 10/15/2024 at 7:04 PM     Finalized by (CST): Roland Randall MD on 10/15/2024 at 7:06 PM       XR SHOULDER, COMPLETE (MIN 2 VIEWS), LEFT (CPT=73030)    Result Date: 10/15/2024  PROCEDURE:  XR SHOULDER, COMPLETE (MIN 2 VIEWS), LEFT (CPT=73030)  TECHNIQUE:  Multiple views were obtained.  COMPARISON:  None.  INDICATIONS:  left arm pain from elbow to shoulder  PATIENT STATED HISTORY: (As transcribed by Technologist)  The patient fell onto his left side five days ago. Bruising and pain to the mid left humerus. Unable to move his left arm. Left knee pain.    FINDINGS:  Mild diffuse osteopenia is favored.  No acute fracture or dislocation is seen.  There is normal alignment.  If clinical symptoms persist then recommend follow-up imaging.            CONCLUSION:  See above.   LOCATION:  [facility]   Dictated by (CST): Roland Randall MD on 10/15/2024 at 7:04 PM     Finalized by (CST): Roland Randall MD on 10/15/2024 at 7:04 PM           MDM      73 YO male with left shoulder, left upper arm and left knee pain after mechanical fall 2 days ago.     Physical exam as above. XR left humerus, let shoulder, left knee negative for acute osseous findings. No focal pain to left humerus. Conservative initial management discussed. Patient agreeable to continue follow-up with PCP or Orthopedics if pain persist.       Medical Decision Making  Amount and/or Complexity of Data  Reviewed  Radiology: ordered and independent interpretation performed. Decision-making details documented in ED Course.    Risk  OTC drugs.        Disposition and Plan     Clinical Impression:  1. Fall, initial encounter         Disposition:  Discharge  10/15/2024  7:22 pm    Follow-up:  Caitlin Samayoa DO  [address] [facility] [address]  [phone]    Schedule an appointment as soon as possible for a visit             Medications Prescribed:  Discharge Medication List as of 10/15/2024  7:22 PM              Supplementary Documentation:

## 2024-10-15 NOTE — ED INITIAL ASSESSMENT (HPI)
Patient states he fell in a parking lot after he rolled his foot. Landed on his hands and knees. C/O left upper arm pain and bruising.

## 2024-10-31 ENCOUNTER — OFFICE VISIT (OUTPATIENT)
Dept: FAMILY MEDICINE CLINIC | Facility: CLINIC | Age: 74
End: 2024-10-31
Payer: MEDICARE

## 2024-10-31 ENCOUNTER — TELEPHONE (OUTPATIENT)
Dept: FAMILY MEDICINE CLINIC | Facility: CLINIC | Age: 74
End: 2024-10-31

## 2024-10-31 VITALS
WEIGHT: 257 LBS | HEART RATE: 70 BPM | DIASTOLIC BLOOD PRESSURE: 70 MMHG | SYSTOLIC BLOOD PRESSURE: 136 MMHG | BODY MASS INDEX: 34.06 KG/M2 | OXYGEN SATURATION: 95 % | RESPIRATION RATE: 19 BRPM | HEIGHT: 73 IN | TEMPERATURE: 98 F

## 2024-10-31 DIAGNOSIS — I25.118 CORONARY ARTERY DISEASE OF NATIVE ARTERY OF NATIVE HEART WITH STABLE ANGINA PECTORIS (HCC): ICD-10-CM

## 2024-10-31 DIAGNOSIS — M25.422 ELBOW SWELLING, LEFT: ICD-10-CM

## 2024-10-31 DIAGNOSIS — M25.522 LEFT ELBOW PAIN: Primary | ICD-10-CM

## 2024-10-31 DIAGNOSIS — Z91.81 HISTORY OF FALL: ICD-10-CM

## 2024-10-31 DIAGNOSIS — I73.9 PAD (PERIPHERAL ARTERY DISEASE) (HCC): ICD-10-CM

## 2024-10-31 PROCEDURE — 3075F SYST BP GE 130 - 139MM HG: CPT | Performed by: FAMILY MEDICINE

## 2024-10-31 PROCEDURE — 1125F AMNT PAIN NOTED PAIN PRSNT: CPT | Performed by: FAMILY MEDICINE

## 2024-10-31 PROCEDURE — 3008F BODY MASS INDEX DOCD: CPT | Performed by: FAMILY MEDICINE

## 2024-10-31 PROCEDURE — 1159F MED LIST DOCD IN RCRD: CPT | Performed by: FAMILY MEDICINE

## 2024-10-31 PROCEDURE — 1170F FXNL STATUS ASSESSED: CPT | Performed by: FAMILY MEDICINE

## 2024-10-31 PROCEDURE — 1160F RVW MEDS BY RX/DR IN RCRD: CPT | Performed by: FAMILY MEDICINE

## 2024-10-31 PROCEDURE — 3078F DIAST BP <80 MM HG: CPT | Performed by: FAMILY MEDICINE

## 2024-10-31 PROCEDURE — G2211 COMPLEX E/M VISIT ADD ON: HCPCS | Performed by: FAMILY MEDICINE

## 2024-10-31 PROCEDURE — 99213 OFFICE O/P EST LOW 20 MIN: CPT | Performed by: FAMILY MEDICINE

## 2024-10-31 NOTE — PROGRESS NOTES
CHIEF COMPLAINT:   Chief Complaint   Patient presents with    Follow - Up     Jamaal Mercedes MANUEL 10/15/2024         HPI:     Jatinder Ennis is a 74 year old male  on plavix presents for left lateral elbow pain that started after patient accidentally fell he missed stepped wearing \"poor shoes\" twisted his right ankle and lost his balance and fell on all 4 extremities on 10/11/2024..  States left knee was bruised and had a large bruise that developed on his left upper extremity on the volar surface.  States left knee was painful and had a mild contusion but that pain is almost completely gone but the left elbow has minimally improved laterally.  He has lost the ability to supinate and likes to maintain pronation.  He had weakness in his left hand.  He feels that is improved slightly but limited to pain in his proximal forearm with squeezing his hand.  He had some left hand tingling but that has resolved.  He is denying any neck pain.  There was no loss of consciousness or head trauma.  His left elbow remains swollen and he has difficulty with supination and some pain with abduction to 180 but able to do so.  No pain with flexion or extension of the elbow joint.  No prior elbow fracture.    On 10/15/2024 went to the immediate care and had x-rays of the left shoulder, knee and forearm-all were negative for acute fracture but left humerus stated some cortical thickening of the proximal lateral humerus but this is not an area of pain.  Using Tylenol for pain.  Able to sleep but sometimes waking up has elbow propped up on a pillow.    HISTORY:  Past Medical History:    AAA (abdominal aortic aneurysm) ([facility])    Arthritis    Atherosclerosis of coronary artery    Back pain    Matrinez's esophagus determined by endoscopy    JPQ-Smyslusl-tfrxokdsq, biopsies positive for Martinez's -aspirin decreased to 81 mg from 325 mg at last endoscopy in 2020.  Repeat endoscopy in 3 years-scheduled EGD 4/19/2023    Bilateral hip pain    Bleeding  nose    BPH (benign prostatic hyperplasia)    Calculus of kidney    Carotid stenosis    mild    Cataract    Cataract, left    already had R eye update 2016    Cervical disc herniation    Change in hair    Started going grey.    Chest pain on exertion    Drs could not get stint put in    Congestive heart disease (HCC)    Coronary atherosclerosis    COVID-19 virus detected    patient has loss of taste and smell    Diastasis recti    ventral    Disorder of prostate    Diverticulosis    Easy bruising    Erythrocytosis    Frequent urination    Hearing impairment    bilateral hearing aids    Hearing loss    Heart attack (HCC)    Heel spur, left    Hemorrhoids    High blood pressure    High cholesterol    History of kidney stones    Hyperlipidemia    Obesity (BMI 30-39.9)    Obstructive sleep apnea (adult) (pediatric)    AHI 35.8 autoPAP 10-20 HME    KRISTEN on CPAP    Osteoarthrosis, unspecified whether generalized or localized, unspecified site    Pain in joints    Peripheral vascular disease (HCC)    Personal history of other diseases of circulatory system    CAD    Plantar fasciitis    Polycythemia    History of polycythemia due to obstructive sleep apnea and followed previously by Dr. Celaya.  Denies headache or abdominal pain.  Denies bruising.  Last CBC 1/15/2019 is normal.  Recommend annual CBC by hematology.    PVD (peripheral vascular disease) (HCC)    On screening 2014 legs erum    S/P CABG (coronary artery bypass graft)    Shortness of breath    with exertion    Sleep apnea    Stented coronary artery    Not in heart, off of aorta to both legs    Trochanteric bursitis of right hip    Type II or unspecified type diabetes mellitus without mention of complication, not stated as uncontrolled      Past Surgical History:   Procedure Laterality Date    Angiogram      stents in aorta    Angioplasty (coronary)      Cabg  2003    X 4 vessels    Cataract  2018    Right eye-Dr. Oppenheim    Cataract      Cath bare metal stent  (bms)      Cholecystectomy      Colonoscopy      Colonoscopy N/A 2015    Procedure: COLONOSCOPY;  Surgeon: Bayron Barlow MD;  Location:  ENDOSCOPY    Colonoscopy N/A 2020    Procedure: COLONOSCOPY;  Surgeon: Alberto Stokes DO;  Location:  ENDOSCOPY    Colonoscopy N/A 2021    Procedure: COLONOSCOPY;  Surgeon: Alberto Stokes DO;  Location:  ENDOSCOPY    Colonoscopy N/A 2022    Procedure: COLONOSCOPY with colds snare polypectomy;  Surgeon: Alberto Stokes DO;  Location:  ENDOSCOPY    Cystoscopy,insert ureteral stent  2014    Other surgical history      passed kidney stone via stent    Tonsillectomy      Vasectomy        Family History   Problem Relation Age of Onset    Arthritis Father     Cancer Father     Heart Disorder Father     Heart Attack Father     Other (CAD) Father     Other (lung cancer) Father     Arthritis Mother     Diabetes Mother     Other (diabetes mellitus) Mother       Social History:   Social History     Socioeconomic History    Marital status:    Tobacco Use    Smoking status: Former     Current packs/day: 0.00     Average packs/day: 1 pack/day for 40.0 years (40.0 ttl pk-yrs)     Types: Cigarettes     Start date: 1967     Quit date: 2003     Years since quittin.5    Smokeless tobacco: Never   Vaping Use    Vaping status: Never Used   Substance and Sexual Activity    Alcohol use: Yes     Alcohol/week: 4.0 standard drinks of alcohol     Types: 2 Glasses of wine, 1 Cans of beer, 1 Shots of liquor per week     Comment: Occasional    Drug use: Never    Sexual activity: Yes   Other Topics Concern    Caffeine Concern No    Stress Concern No    Weight Concern Yes    Special Diet No    Exercise Yes    Seat Belt No     Social Drivers of Health     Physical Activity: Sufficiently Active (3/1/2021)    [facility]    Exercise Vital Sign     Days of Exercise per Week: 5 days      Minutes of Exercise per Session: 30 min        Medications (Active prior to today's visit):  Current Outpatient Medications   Medication Sig Dispense Refill    lisinopril 10 MG Oral Tab TAKE 1 TABLET EVERY DAY 90 tablet 0    semaglutide (OZEMPIC, 2 MG/DOSE,) 8 MG/3ML Subcutaneous Solution Pen-injector INJECT 2MG UNDER THE SKIN ONE TIME WEEKLY 3 each 0    PANTOPRAZOLE 40 MG Oral Tab EC TAKE 1 TABLET EVERY DAY 90 tablet 1    rosuvastatin 40 MG Oral Tab Take 1 tablet (40 mg total) by mouth nightly. 90 tablet 1    ezetimibe 10 MG Oral Tab Take 1 tablet (10 mg total) by mouth nightly. 90 tablet 1    amLODIPine 5 MG Oral Tab Take 1 tablet (5 mg total) by mouth daily. 90 tablet 1    metoprolol succinate ER 50 MG Oral Tablet 24 Hr Take 1 tablet (50 mg total) by mouth daily. 90 tablet 3    tamsulosin 0.4 MG Oral Cap Take 2 capsules (0.8 mg total) by mouth daily. 180 capsule 0    nitroglycerin 0.4 MG Sublingual SL Tab TAKE 1 TABLET SUBLINGUAL ONCE A DAY AS NEEDED FOR CHEST PAIN      Acetaminophen ER (TYLENOL 8 HOUR ARTHRITIS PAIN) 650 MG Oral Tab CR Take 2 tablets (1,300 mg total) by mouth every 8 (eight) hours as needed for Pain (Max is 6 tablets in 24 hours or 4000 mg of Tylenol/acetaminophen in 24 hours). 180 tablet 1    diclofenac 1 % External Gel Apply 2 g topically 4 (four) times daily. Buy otc 150 g 1    sildenafil 20 MG Oral Tab TAKE 5 TABLETS BY MOUTH 1 HOUR BEFORE SEXUAL ACTIVITY. DO NOT EXCEED 5 TABLETS PER 24 HOURS      Vitamin D3, Cholecalciferol, 50 MCG (2000 UT) Oral Tab vitamin D3  2000 IUs take 1 tablet daily 365 tablet 0    Clopidogrel Bisulfate 75 MG Oral Tab Take 1 tablet (75 mg total) by mouth daily. 30 tablet 0    aspirin 81 MG Oral Tab EC Take 1 tablet (81 mg total) by mouth daily.         Allergies:  Allergies[1]    PSFH elements reviewed from today and agreed except as otherwise stated in HPI.  PHYSICAL EXAM:   /70 (BP Location: Right arm, Patient Position: Sitting, Cuff Size: adult)   Pulse 70    Temp 98.2 °F (36.8 °C) (Temporal)   Resp 19   Ht 6' 1\" (1.854 m)   Wt 257 lb (116.6 kg)   SpO2 95%   BMI 33.91 kg/m²   Vital signs reviewed.Appears stated age, well groomed.  Physical Exam   General: Well-nourished, well hydrated. No acute distress. No pallor. No tachypnea. Non toxic.  HEENT: normocephaly, atraumatic.  Sclera clear and non icteric bilaterally.  Neck: supple. No adenopathy. No thyromegaly.   Heart: RRR without S3 or S4 murmur . Clear S1S2  Lungs: clear to auscultation bilaterally. No rales, rhonchi or wheezes. Good inspiratory and expiratory effort  Abdomen: soft, nontender, nondistended. NL bowel sounds.   Extremities: No cyanosis, clubbing, or edema bilaterally except left elbow edematous.  Moderate tenderness localized over the left lateral malleolus and distal humerus with mild palpation.  Unable to supinate more than 5 degrees and complaining of pain.  Feels comfortable with hand and pronation.  Grasp strength of the left hand is +4 and +5 is the right hand..   Skin: no acute rashes  Neuro: AOx3.  Normal gait.  Sensation intact to touch of the upper extremities      LABS     XR HUMERUS (MIN 2 VIEWS), LEFT (CPT=73060)    Result Date: 10/15/2024  PROCEDURE:  XR HUMERUS (MIN 2 VIEWS), LEFT (CPT=73060)  INDICATIONS:  left arm pain from elbow to shoulder  COMPARISON:  [facility], XR, XR SHOULDER, COMPLETE (MIN 2 VIEWS), LEFT (CPT=73030), 10/15/2024, 6:16 PM.  PATIENT STATED HISTORY: (As transcribed by Technologist)  The patient fell onto his left side five days ago. Bruising and pain to the mid left humerus. Unable to move his left arm. Left knee pain.    FINDINGS:  BONES:  There is focal cortical thickening seen along the lateral aspect of the proximal to mid shaft of the left humerus.  This could reflect changes related to remote injury at the insertion site of the deltoid.  No acute fracture seen.  No subluxation  or dislocation.  The glenohumeral joint space is  maintained. SOFT TISSUES:  No focal soft tissue swelling. EFFUSION:   None visible.            CONCLUSION:  There is focal cortical thickening and slight irregularity at the proximal to mid diaphysis of the lateral aspect of the humerus.  This could reflect remote injury changes at the insertion site of the deltoid.  No acute fracture seen.  If there is focal pain in this location then follow-up bone scan or MRI may aid in further evaluation.   LOCATION:  [facility]   Dictated by (CST): Eddie Cruz MD on 10/15/2024 at 7:10 PM     Finalized by (CST): Eddie Cruz MD on 10/15/2024 at 7:18 PM       XR KNEE (1 OR 2 VIEWS), LEFT (CPT=73560)    Result Date: 10/15/2024  PROCEDURE:  XR KNEE (1 OR 2 VIEWS), LEFT (CPT=73560)  COMPARISON:  None.  INDICATIONS:  left arm pain from elbow to shoulder  PATIENT STATED HISTORY: (As transcribed by Technologist)  The patient fell onto his left side five days ago. Bruising and pain to the mid left humerus. Unable to move his left arm. Left knee pain.    FINDINGS:  Moderate to severe medial compartment space narrowing is present with subchondral sclerosis.  Osteophyte formation noted over the medial tibial plateau.  No joint effusion is seen.  Prominent vascular calcifications are noted.  No acute fracture is seen.            CONCLUSION:  See above.   LOCATION:  [facility]   Dictated by (CST): Roland Randall MD on 10/15/2024 at 7:04 PM     Finalized by (CST): Roland Randall MD on 10/15/2024 at 7:06 PM       XR SHOULDER, COMPLETE (MIN 2 VIEWS), LEFT (CPT=73030)    Result Date: 10/15/2024  PROCEDURE:  XR SHOULDER, COMPLETE (MIN 2 VIEWS), LEFT (CPT=73030)  TECHNIQUE:  Multiple views were obtained.  COMPARISON:  None.  INDICATIONS:  left arm pain from elbow to shoulder  PATIENT STATED HISTORY: (As transcribed by Technologist)  The patient fell onto his left side five days ago. Bruising and pain to the mid left humerus. Unable to move his left arm. Left knee pain.    FINDINGS:  Mild diffuse  osteopenia is favored.  No acute fracture or dislocation is seen.  There is normal alignment.  If clinical symptoms persist then recommend follow-up imaging.            CONCLUSION:  See above.   LOCATION:  [facility]   Dictated by (CST): Roland Randall MD on 10/15/2024 at 7:04 PM     Finalized by (CST): Roland Randall MD on 10/15/2024 at 7:04 PM         REVIEWED THIS VISIT  ASSESSMENT/PLAN:   74 year old male with    1. Left elbow pain  2. Elbow swelling, left  3. History of fall  - MRI ELBOW, LEFT (CPT=73221); Future  - Ortho Referral - In Network  Suspect occult fracture of the proximal radius/radial head.  Recommend by over-the-counter sling for elbow and do not sleep in it but use during the day  Avoid using left arm  Schedule left elbow MRI ASAP-office will assist  If negative for fracture then may have severe lateral epicondylitis and would benefit from injection with orthopedist  Given disability and loss of function of left elbow will probably need physical therapy  Continue Tylenol for pain control    4. Coronary artery disease of native artery of native heart with stable angina pectoris (HCC)  5. PAD (peripheral artery disease) (HCC)  On Plavix  Denies any headaches.  Feels otherwise well no dizziness      The patient and provider have a longitudinal relationship to address/treat the serious or complex condition as stated in this encounter.      Meds This Visit:  Requested Prescriptions      No prescriptions requested or ordered in this encounter       Health Maintenance:  Health Maintenance   Topic Date Due    COVID-19 Vaccine (7 - 2023-24 season) 09/01/2024    Colorectal Cancer Screening  01/14/2025    Influenza Vaccine (1) 06/30/2025 (Originally 10/1/2024)    Diabetes Care: Microalb/Creat Ratio  03/15/2025    Diabetes Care A1C  03/16/2025    Diabetes Care Dilated Eye Exam  09/09/2025    Diabetes Care: GFR  09/16/2025    PSA  09/20/2025    Diabetes Care Foot Exam  09/24/2025    MA Annual Health Assessment  Completed     Annual Depression Screening  Completed    Fall Risk Screening (Annual)  Completed    Pneumococcal Vaccine: 65+ Years  Completed    Zoster Vaccines  Completed         Patient/Caregiver Education: Patient/Caregiver Education: There are no barriers to learning. Medical education done.   Outcome: Patient verbalizes understanding. Patient is notified to call with any questions, comp lications, allergies, or worsening or changing symptoms.  Patient is to call with any side effects or complications from the treatments as a result of today.     Problem List:     Patient Active Problem List   Diagnosis    Erectile dysfunction    Coronary artery disease of native artery of native heart with stable angina pectoris (HCC)    BPH (benign prostatic hyperplasia)    S/P CABG (coronary artery bypass graft)    Bilateral atherosclerosis of legs (HCC)    Carotid artery disease (HCC)    Diverticulosis    Fatty liver    AAA (abdominal aortic aneurysm) (HCC)    Osteoarthritis of spine with radiculopathy, cervical region    Foraminal stenosis of cervical region    Facet arthritis of cervical region    DM (diabetes mellitus) type II, controlled, with peripheral vascular disorder (HCC)    Sensorineural hearing loss    KRISTEN on CPAP    Arthritis of lumbar spine    Hyperlipidemia, mixed    Tubular adenoma of colon    Martinez's esophagus determined by endoscopy    Lumbar degenerative disc disease    PAD (peripheral artery disease) (HCC)    Essential hypertension    Status post insertion of iliac artery stent    Aortoiliac occlusive disease (HCC)    BMI 33.0-33.9,adult    Chronic pain of right knee    Weight gain    Asymptomatic microscopic hematuria    Primary osteoarthritis of left knee    History of fall    Elbow swelling, left    Left elbow pain       Imaging & Referrals:  INFLUENZA REFUSED [facility]  ORTHOPEDIC - INTERNAL  MRI ELBOW, LEFT (CPT=73221)     10/31/2024  Caitlin Samayoa, DO      Patient understands plan and follow-up.  Return in about 5  months (around 3/31/2025) for medicare physical, HTN,HL,DM, fasting labs AM, sooner if needed..            [1]   Allergies  Allergen Reactions    Adhesive Tape ITCHING     Redness/itching EKG stickers

## 2024-10-31 NOTE — TELEPHONE ENCOUNTER
Pt came to office stating next available appt for MRI is  11/27 and pt states that he needs an open MRI machine and was told by central scheduling that dr needs to put it STAT for pt to have it sooner.

## 2024-11-01 ENCOUNTER — TELEPHONE (OUTPATIENT)
Dept: FAMILY MEDICINE CLINIC | Facility: CLINIC | Age: 74
End: 2024-11-01

## 2024-11-01 ENCOUNTER — TELEPHONE (OUTPATIENT)
Dept: ORTHOPEDICS CLINIC | Facility: CLINIC | Age: 74
End: 2024-11-01

## 2024-11-01 DIAGNOSIS — M25.522 LEFT ELBOW PAIN: Primary | ICD-10-CM

## 2024-11-01 RX ORDER — ALPRAZOLAM 0.5 MG
TABLET ORAL
Qty: 2 TABLET | Refills: 0 | Status: SHIPPED | OUTPATIENT
Start: 2024-11-01

## 2024-11-01 NOTE — TELEPHONE ENCOUNTER
DOI 10/15/2024    Imaging in EMR and pending MRI~    Impression   CONCLUSION:  There is focal cortical thickening and slight irregularity at the proximal to mid diaphysis of the lateral aspect of the humerus.  This could reflect remote injury changes at the insertion site of the deltoid.  No acute fracture seen.  If  there is focal pain in this location then follow-up bone scan or MRI may aid in further evaluation.       Please advise who can see? Thanks!

## 2024-11-01 NOTE — TELEPHONE ENCOUNTER
Patient is requesting open MRI and existing order to be changed to STAT.  Please advise.  Thanks.

## 2024-11-01 NOTE — TELEPHONE ENCOUNTER
Called central scheduling STAT line and Cindy stated they do not have open MRI. Order needs to be changed.

## 2024-11-01 NOTE — TELEPHONE ENCOUNTER
Called patient.  Was intolerant of prior closed MRI but given Valium and able to perform study.  Sent in for closed MRI stat of left shoulder another order.    Sent in Rx alprazolam 0.5 mg take 1 tablet p.o. 1 hour prior to procedure and may repeat x 1 dose at the time of procedure if still anxious.  Patient should avoid driving or operating machinery for at least 12 hours after ingestion.  Needs to avoid alcohol for the next 24 hours or other sedating medication.    Discussed with patient to schedule MRI of left elbow now and then call orthopedist for follow-up appointment.  Reviewed Ortho notes would like him to have MRI prior to OV.    Patient states left elbow is still having moderate pain.  Wearing sling today.  Uncertain if worse or better.  Denies any numbness or tingling.

## 2024-11-04 ENCOUNTER — HOSPITAL ENCOUNTER (OUTPATIENT)
Dept: MRI IMAGING | Facility: HOSPITAL | Age: 74
Discharge: HOME | End: 2024-11-04
Attending: FAMILY MEDICINE
Payer: MEDICARE

## 2024-11-04 DIAGNOSIS — M25.522 LEFT ELBOW PAIN: ICD-10-CM

## 2024-11-04 PROCEDURE — 73221 MRI JOINT UPR EXTREM W/O DYE: CPT | Performed by: FAMILY MEDICINE

## 2024-11-05 ENCOUNTER — TELEPHONE (OUTPATIENT)
Dept: ORTHOPEDICS CLINIC | Facility: CLINIC | Age: 74
End: 2024-11-05

## 2024-11-05 DIAGNOSIS — M25.522 LEFT ELBOW PAIN: Primary | ICD-10-CM

## 2024-11-05 NOTE — TELEPHONE ENCOUNTER
Patient called and stated he got his MRI done yesterday. Patient states MRI confirmed fracture and he would like to see Dr. Ng sooner than his sched appt next week.  Please advise if able to double book patient this week.     Future Appointments   Date Time Provider Department Center   11/13/2024 10:30 AM Ronald Ng MD [facility] [address]

## 2024-11-05 NOTE — TELEPHONE ENCOUNTER
Future Appointments   Date Time Provider Department Center   11/7/2024  8:00 AM Ronald Ng MD [facility] [address]   3/27/2025  9:30 AM Caitlin Samayoa DO [facility] [place]

## 2024-11-05 NOTE — TELEPHONE ENCOUNTER
Patient has an appointment scheduled with Dr. Ng on 11/7/24 for potential left elbow fx. MRI results. Please advise if imaging is needed prior.

## 2024-11-07 ENCOUNTER — OFFICE VISIT (OUTPATIENT)
Facility: CLINIC | Age: 74
End: 2024-11-07
Payer: MEDICARE

## 2024-11-07 ENCOUNTER — HOSPITAL ENCOUNTER (OUTPATIENT)
Dept: GENERAL RADIOLOGY | Age: 74
Discharge: HOME | End: 2024-11-07
Attending: STUDENT IN AN ORGANIZED HEALTH CARE EDUCATION/TRAINING PROGRAM
Payer: MEDICARE

## 2024-11-07 DIAGNOSIS — S52.125A CLOSED NONDISPLACED FRACTURE OF HEAD OF LEFT RADIUS, INITIAL ENCOUNTER: Primary | ICD-10-CM

## 2024-11-07 DIAGNOSIS — M25.522 LEFT ELBOW PAIN: ICD-10-CM

## 2024-11-07 PROCEDURE — 99204 OFFICE O/P NEW MOD 45 MIN: CPT | Performed by: STUDENT IN AN ORGANIZED HEALTH CARE EDUCATION/TRAINING PROGRAM

## 2024-11-07 PROCEDURE — 1159F MED LIST DOCD IN RCRD: CPT | Performed by: STUDENT IN AN ORGANIZED HEALTH CARE EDUCATION/TRAINING PROGRAM

## 2024-11-07 PROCEDURE — 1160F RVW MEDS BY RX/DR IN RCRD: CPT | Performed by: STUDENT IN AN ORGANIZED HEALTH CARE EDUCATION/TRAINING PROGRAM

## 2024-11-07 PROCEDURE — 73080 X-RAY EXAM OF ELBOW: CPT | Performed by: STUDENT IN AN ORGANIZED HEALTH CARE EDUCATION/TRAINING PROGRAM

## 2024-11-07 NOTE — PROGRESS NOTES
Clinic Note     Allergies[1]    CC: left elbow pain    HPI: This 74 year old RHD male presents with left elbow pain after a fall on 10/11/24; he twisted his right ankle, missed a step and fell onto an outstretched hand at that time. He was referred to me by Dr. Samayoa after recent MRI of the left elbow revealed a left radial neck fracture. He still has trace pain in the left elbow with certain movements, but overall his pain has improved.     Onset: 10/11/24  Pain Character: sharp  Pain Level: mild    Treatments Tried: sling    Occupation: retired delivery    Review of Systems:  Negative unless stated in HPI.    History/Other:   Past Medical History:    AAA (abdominal aortic aneurysm) (HCC)    Arthritis    Atherosclerosis of coronary artery    Back pain    Martinez's esophagus determined by endoscopy    XRD-Yelzthyp-ymjeeteck, biopsies positive for Martinez's -aspirin decreased to 81 mg from 325 mg at last endoscopy in 2020.  Repeat endoscopy in 3 years-scheduled EGD 4/19/2023    Bilateral hip pain    Bleeding nose    BPH (benign prostatic hyperplasia)    Calculus of kidney    Carotid stenosis    mild    Cataract    Cataract, left    already had R eye update 2016    Cervical disc herniation    Change in hair    Started going grey.    Chest pain on exertion    Drs could not get stint put in    Congestive heart disease (HCC)    Coronary atherosclerosis    COVID-19 virus detected    patient has loss of taste and smell    Diastasis recti    ventral    Disorder of prostate    Diverticulosis    Easy bruising    Erythrocytosis    Frequent urination    Hearing impairment    bilateral hearing aids    Hearing loss    Heart attack (HCC)    Heel spur, left    Hemorrhoids    High blood pressure    High cholesterol    History of kidney stones    Hyperlipidemia    Obesity (BMI 30-39.9)    Obstructive sleep apnea (adult) (pediatric)    AHI 35.8 autoPAP 10-20 HME    KRISTEN on CPAP    Osteoarthrosis, unspecified whether generalized  or localized, unspecified site    Pain in joints    Peripheral vascular disease (HCC)    Personal history of other diseases of circulatory system    CAD    Plantar fasciitis    Polycythemia    History of polycythemia due to obstructive sleep apnea and followed previously by Dr. Celaya.  Denies headache or abdominal pain.  Denies bruising.  Last CBC 1/15/2019 is normal.  Recommend annual CBC by hematology.    PVD (peripheral vascular disease) (HCC)    On screening 2014 legs erum    S/P CABG (coronary artery bypass graft)    Shortness of breath    with exertion    Sleep apnea    Stented coronary artery    Not in heart, off of aorta to both legs    Trochanteric bursitis of right hip    Type II or unspecified type diabetes mellitus without mention of complication, not stated as uncontrolled     Past Surgical History:   Procedure Laterality Date    Angiogram      stents in aorta    Angioplasty (coronary)      Cabg  2003    X 4 vessels    Cataract  2018    Right eye-Dr. Oppenheim    Cataract      Cath bare metal stent (bms)      Cholecystectomy  2003    Colonoscopy  2003    Colonoscopy N/A 01/30/2015    Procedure: COLONOSCOPY;  Surgeon: Bayron Barlow MD;  Location:  ENDOSCOPY    Colonoscopy N/A 01/17/2020    Procedure: COLONOSCOPY;  Surgeon: Alberto Stokes DO;  Location:  ENDOSCOPY    Colonoscopy N/A 03/03/2021    Procedure: COLONOSCOPY;  Surgeon: Alberto Stokes DO;  Location:  ENDOSCOPY    Colonoscopy N/A 01/14/2022    Procedure: COLONOSCOPY with colds snare polypectomy;  Surgeon: Alberto Stokes DO;  Location:  ENDOSCOPY    Cystoscopy,insert ureteral stent  12/2014    Other surgical history  2014    passed kidney stone via stent    Tonsillectomy      Vasectomy       Current Outpatient Medications   Medication Sig Dispense Refill    ALPRAZolam (XANAX) 0.5 MG Oral Tab Take 1 tab po 1 hour prior to MRI and if still anxious, repeat dose x 1. No driving, operating machinery for at least 12 h after  ingestion. Avoid any Etoh for 24 h or other sedating medications. 2 tablet 0    lisinopril 10 MG Oral Tab TAKE 1 TABLET EVERY DAY 90 tablet 0    semaglutide (OZEMPIC, 2 MG/DOSE,) 8 MG/3ML Subcutaneous Solution Pen-injector INJECT 2MG UNDER THE SKIN ONE TIME WEEKLY 3 each 0    PANTOPRAZOLE 40 MG Oral Tab EC TAKE 1 TABLET EVERY DAY 90 tablet 1    rosuvastatin 40 MG Oral Tab Take 1 tablet (40 mg total) by mouth nightly. 90 tablet 1    ezetimibe 10 MG Oral Tab Take 1 tablet (10 mg total) by mouth nightly. 90 tablet 1    amLODIPine 5 MG Oral Tab Take 1 tablet (5 mg total) by mouth daily. 90 tablet 1    metoprolol succinate ER 50 MG Oral Tablet 24 Hr Take 1 tablet (50 mg total) by mouth daily. 90 tablet 3    tamsulosin 0.4 MG Oral Cap Take 2 capsules (0.8 mg total) by mouth daily. 180 capsule 0    nitroglycerin 0.4 MG Sublingual SL Tab TAKE 1 TABLET SUBLINGUAL ONCE A DAY AS NEEDED FOR CHEST PAIN      Acetaminophen ER (TYLENOL 8 HOUR ARTHRITIS PAIN) 650 MG Oral Tab CR Take 2 tablets (1,300 mg total) by mouth every 8 (eight) hours as needed for Pain (Max is 6 tablets in 24 hours or 4000 mg of Tylenol/acetaminophen in 24 hours). 180 tablet 1    diclofenac 1 % External Gel Apply 2 g topically 4 (four) times daily. Buy otc 150 g 1    sildenafil 20 MG Oral Tab TAKE 5 TABLETS BY MOUTH 1 HOUR BEFORE SEXUAL ACTIVITY. DO NOT EXCEED 5 TABLETS PER 24 HOURS      Vitamin D3, Cholecalciferol, 50 MCG (2000 UT) Oral Tab vitamin D3  2000 IUs take 1 tablet daily 365 tablet 0    Clopidogrel Bisulfate 75 MG Oral Tab Take 1 tablet (75 mg total) by mouth daily. 30 tablet 0    aspirin 81 MG Oral Tab EC Take 1 tablet (81 mg total) by mouth daily.       Family History   Problem Relation Age of Onset    Arthritis Father     Cancer Father     Heart Disorder Father     Heart Attack Father     Other (CAD) Father     Other (lung cancer) Father     Arthritis Mother     Diabetes Mother     Other (diabetes mellitus) Mother      Social History      Occupational History    Not on file   Tobacco Use    Smoking status: Former     Current packs/day: 0.00     Average packs/day: 1 pack/day for 40.0 years (40.0 ttl pk-yrs)     Types: Cigarettes     Start date: 1967     Quit date: 2003     Years since quittin.5    Smokeless tobacco: Never   Vaping Use    Vaping status: Never Used   Substance and Sexual Activity    Alcohol use: Yes     Alcohol/week: 4.0 standard drinks of alcohol     Types: 2 Glasses of wine, 1 Cans of beer, 1 Shots of liquor per week     Comment: Occasional    Drug use: Never    Sexual activity: Yes        Physical Exam:     There were no vitals taken for this visit.    Constitutional: NAD. AOx3. Well-developed and Well-nourished.   Psychiatric: Normal mood/ affect/ behavior. Judgment and thought content normal.     Left Upper Extremity:     Inspection    Skin intact. No skin lesions. No obvious mass visualized. No crepitus to ROM.   Palpation    No instability to stress of the medial and lateral collateral ligaments. Tender to palpation over the radial head. No palpable block to motion. No tenderness to palpation at the medial and lateral epicondylar regions, radial tunnel.  Biceps and Triceps are palpable and grossly intact. No tenderness to palpation over the wrist. No notable DRUJ instability upon DRUJ stress testing.      ROM      Left      AROM PROM   Elbow Extension:   20 15   Elbow Flexion:   115 120   Pronation:   80 80   Supination:    80     80        Neurovascular    Normal sensation in the median, ulnar, and radial nerve distribution. Normal motor function of muscles innervated by median/AIN, ulnar, and radial/PIN nerves.    Normally perfused hand(s).               Diagnostic Studies:  I reviewed the images independently from the professional interpretation, and discussed my interpretation of the pertinent findings with patient/family.    Xrays of Left elbow 4V: On my independent review of the radiographs, there is  evidence of a radial neck fracture with trace displacement and mild impaction.    MRI of the left elbow from 11/4/24 redemonstrates the above.      Assessment/Plan:  74 year old male with left radial neck fracture.    I reviewed the patient's electronic medical record, including the pertinent office notes, medical/surgical history, medications and images. I specifically reviewed the images noted above, independently and discussed my independent interpretation of these images in combination with the pertinent positive and negative findings in my clinical exam with the patient    left elbow radial neck fracture.    I discussed that this is a minimally displaced radial neck fracture with no mechanical block to motion, and that these fractures heal reliably well with one week or less of sling immobilization, followed by range of motion exercises as tolerated. Given that the patient is 3 weeks out from injury, I recommend discontinuing the sling & continuing range of motion exercises only moving forward. We discussed OT, but patient chose to defer OT for now. He will remain no weight bearing > 5lbs for the next 3 weeks.    Follow Up: In 3 weeks with repeat xrays of the left elbow    Ronald Ng MD   Hand, Wrist, & Elbow Surgery  [email or web address]       [1]   Allergies  Allergen Reactions    Adhesive Tape ITCHING     Redness/itching EKG stickers

## 2024-11-14 ENCOUNTER — TELEPHONE (OUTPATIENT)
Dept: FAMILY MEDICINE CLINIC | Facility: CLINIC | Age: 74
End: 2024-11-14

## 2024-11-14 NOTE — TELEPHONE ENCOUNTER
Reached patient for medication adherence consult. Patient overdue for refill on rosuvastatin per insurance report.    Patient reports that he is taking the medication as prescribed. He reports tolerating the medication well. He denies the need for refills at this time. He requests to not receive medication adherence phone calls any more.    Provided education on importance of adherence. Patient denies any questions or concerns with medication.

## 2024-11-26 ENCOUNTER — TELEPHONE (OUTPATIENT)
Facility: CLINIC | Age: 74
End: 2024-11-26

## 2024-11-26 ENCOUNTER — TELEPHONE (OUTPATIENT)
Dept: FAMILY MEDICINE CLINIC | Facility: CLINIC | Age: 74
End: 2024-11-26

## 2024-11-26 DIAGNOSIS — M25.562 ACUTE PAIN OF LEFT KNEE: Primary | ICD-10-CM

## 2024-11-26 DIAGNOSIS — M25.522 LEFT ELBOW PAIN: Primary | ICD-10-CM

## 2024-11-26 NOTE — TELEPHONE ENCOUNTER
He needs referral to dr lombardi for his knees.  Needs the referral back dated his appointment was today.

## 2024-11-26 NOTE — TELEPHONE ENCOUNTER
Left message for pt to call back.     There was a referral placed for Dr. Lombardi in August and 3 visits were authorized for pain in left knee. Has he used all of his visits already?    I see he is scheduled to see Dr. Ronald Ng (hand surgeon) tomorrow. Is this who he is requesting the referral for?    Please ask pt for reason for visit.

## 2024-11-27 ENCOUNTER — HOSPITAL ENCOUNTER (OUTPATIENT)
Dept: GENERAL RADIOLOGY | Age: 74
Discharge: HOME | End: 2024-11-27
Attending: STUDENT IN AN ORGANIZED HEALTH CARE EDUCATION/TRAINING PROGRAM
Payer: MEDICARE

## 2024-11-27 ENCOUNTER — OFFICE VISIT (OUTPATIENT)
Facility: CLINIC | Age: 74
End: 2024-11-27
Payer: MEDICARE

## 2024-11-27 DIAGNOSIS — S52.125D CLOSED NONDISPLACED FRACTURE OF HEAD OF LEFT RADIUS WITH ROUTINE HEALING, SUBSEQUENT ENCOUNTER: Primary | ICD-10-CM

## 2024-11-27 DIAGNOSIS — M25.522 LEFT ELBOW PAIN: ICD-10-CM

## 2024-11-27 PROCEDURE — 99213 OFFICE O/P EST LOW 20 MIN: CPT | Performed by: STUDENT IN AN ORGANIZED HEALTH CARE EDUCATION/TRAINING PROGRAM

## 2024-11-27 PROCEDURE — 1159F MED LIST DOCD IN RCRD: CPT | Performed by: STUDENT IN AN ORGANIZED HEALTH CARE EDUCATION/TRAINING PROGRAM

## 2024-11-27 PROCEDURE — 1160F RVW MEDS BY RX/DR IN RCRD: CPT | Performed by: STUDENT IN AN ORGANIZED HEALTH CARE EDUCATION/TRAINING PROGRAM

## 2024-11-27 PROCEDURE — 73080 X-RAY EXAM OF ELBOW: CPT | Performed by: STUDENT IN AN ORGANIZED HEALTH CARE EDUCATION/TRAINING PROGRAM

## 2024-11-27 NOTE — PROGRESS NOTES
Clinic Note     Allergies[1]    CC: left elbow pain    HPI:   From prior visit 11/7/24:  This 74 year old RHD male presents with left elbow pain after a fall on 10/11/24; he twisted his right ankle, missed a step and fell onto an outstretched hand at that time. He was referred to me by Dr. Samayoa after recent MRI of the left elbow revealed a left radial neck fracture. He still has trace pain in the left elbow with certain movements, but overall his pain has improved.     Onset: 10/11/24  Pain Character: sharp  Pain Level: mild    Treatments Tried: sling    Occupation: retired delivery    Today's visit 11/27/24:  Overall doing well. His pain has improved significantly. Occasionally taking tylenol as needed for pain relief. No other issues.     Review of Systems:  Negative unless stated in HPI.    History/Other:   Past Medical History:    AAA (abdominal aortic aneurysm) (HCC)    Arthritis    Atherosclerosis of coronary artery    Back pain    Martinez's esophagus determined by endoscopy    OWN-Wirrxnfr-ixnepkkwz, biopsies positive for Martinez's -aspirin decreased to 81 mg from 325 mg at last endoscopy in 2020.  Repeat endoscopy in 3 years-scheduled EGD 4/19/2023    Bilateral hip pain    Bleeding nose    BPH (benign prostatic hyperplasia)    Calculus of kidney    Carotid stenosis    mild    Cataract    Cataract, left    already had R eye update 2016    Cervical disc herniation    Change in hair    Started going grey.    Chest pain on exertion    Drs could not get stint put in    Congestive heart disease (HCC)    Coronary atherosclerosis    COVID-19 virus detected    patient has loss of taste and smell    Diastasis recti    ventral    Disorder of prostate    Diverticulosis    Easy bruising    Erythrocytosis    Frequent urination    Hearing impairment    bilateral hearing aids    Hearing loss    Heart attack (HCC)    Heel spur, left    Hemorrhoids    High blood pressure    High cholesterol    History of kidney stones     Hyperlipidemia    Obesity (BMI 30-39.9)    Obstructive sleep apnea (adult) (pediatric)    AHI 35.8 autoPAP 10-20 HME    KRISTEN on CPAP    Osteoarthrosis, unspecified whether generalized or localized, unspecified site    Pain in joints    Peripheral vascular disease (HCC)    Personal history of other diseases of circulatory system    CAD    Plantar fasciitis    Polycythemia    History of polycythemia due to obstructive sleep apnea and followed previously by Dr. Celaya.  Denies headache or abdominal pain.  Denies bruising.  Last CBC 1/15/2019 is normal.  Recommend annual CBC by hematology.    PVD (peripheral vascular disease) (HCC)    On screening 2014 legs erum    S/P CABG (coronary artery bypass graft)    Shortness of breath    with exertion    Sleep apnea    Stented coronary artery    Not in heart, off of aorta to both legs    Trochanteric bursitis of right hip    Type II or unspecified type diabetes mellitus without mention of complication, not stated as uncontrolled     Past Surgical History:   Procedure Laterality Date    Angiogram      stents in aorta    Angioplasty (coronary)      Cabg  2003    X 4 vessels    Cataract  2018    Right eye-Dr. Oppenheim    Cataract      Cath bare metal stent (bms)      Cholecystectomy  2003    Colonoscopy  2003    Colonoscopy N/A 01/30/2015    Procedure: COLONOSCOPY;  Surgeon: Bayron Barlow MD;  Location:  ENDOSCOPY    Colonoscopy N/A 01/17/2020    Procedure: COLONOSCOPY;  Surgeon: Alberto Stokes DO;  Location:  ENDOSCOPY    Colonoscopy N/A 03/03/2021    Procedure: COLONOSCOPY;  Surgeon: Alberto Stokes DO;  Location:  ENDOSCOPY    Colonoscopy N/A 01/14/2022    Procedure: COLONOSCOPY with colds snare polypectomy;  Surgeon: Alberto Stokes DO;  Location:  ENDOSCOPY    Cystoscopy,insert ureteral stent  12/2014    Other surgical history  2014    passed kidney stone via stent    Tonsillectomy      Vasectomy       Current Outpatient Medications   Medication Sig  Dispense Refill    ALPRAZolam (XANAX) 0.5 MG Oral Tab Take 1 tab po 1 hour prior to MRI and if still anxious, repeat dose x 1. No driving, operating machinery for at least 12 h after ingestion. Avoid any Etoh for 24 h or other sedating medications. 2 tablet 0    lisinopril 10 MG Oral Tab TAKE 1 TABLET EVERY DAY 90 tablet 0    semaglutide (OZEMPIC, 2 MG/DOSE,) 8 MG/3ML Subcutaneous Solution Pen-injector INJECT 2MG UNDER THE SKIN ONE TIME WEEKLY 3 each 0    PANTOPRAZOLE 40 MG Oral Tab EC TAKE 1 TABLET EVERY DAY 90 tablet 1    rosuvastatin 40 MG Oral Tab Take 1 tablet (40 mg total) by mouth nightly. 90 tablet 1    ezetimibe 10 MG Oral Tab Take 1 tablet (10 mg total) by mouth nightly. 90 tablet 1    amLODIPine 5 MG Oral Tab Take 1 tablet (5 mg total) by mouth daily. 90 tablet 1    metoprolol succinate ER 50 MG Oral Tablet 24 Hr Take 1 tablet (50 mg total) by mouth daily. 90 tablet 3    tamsulosin 0.4 MG Oral Cap Take 2 capsules (0.8 mg total) by mouth daily. 180 capsule 0    nitroglycerin 0.4 MG Sublingual SL Tab TAKE 1 TABLET SUBLINGUAL ONCE A DAY AS NEEDED FOR CHEST PAIN      Acetaminophen ER (TYLENOL 8 HOUR ARTHRITIS PAIN) 650 MG Oral Tab CR Take 2 tablets (1,300 mg total) by mouth every 8 (eight) hours as needed for Pain (Max is 6 tablets in 24 hours or 4000 mg of Tylenol/acetaminophen in 24 hours). 180 tablet 1    diclofenac 1 % External Gel Apply 2 g topically 4 (four) times daily. Buy otc 150 g 1    sildenafil 20 MG Oral Tab TAKE 5 TABLETS BY MOUTH 1 HOUR BEFORE SEXUAL ACTIVITY. DO NOT EXCEED 5 TABLETS PER 24 HOURS      Vitamin D3, Cholecalciferol, 50 MCG (2000 UT) Oral Tab vitamin D3  2000 IUs take 1 tablet daily 365 tablet 0    Clopidogrel Bisulfate 75 MG Oral Tab Take 1 tablet (75 mg total) by mouth daily. 30 tablet 0    aspirin 81 MG Oral Tab EC Take 1 tablet (81 mg total) by mouth daily.       Family History   Problem Relation Age of Onset    Arthritis Father     Cancer Father     Heart Disorder Father      Heart Attack Father     Other (CAD) Father     Other (lung cancer) Father     Arthritis Mother     Diabetes Mother     Other (diabetes mellitus) Mother      Social History     Occupational History    Not on file   Tobacco Use    Smoking status: Former     Current packs/day: 0.00     Average packs/day: 1 pack/day for 40.0 years (40.0 ttl pk-yrs)     Types: Cigarettes     Start date: 1967     Quit date: 2003     Years since quittin.5    Smokeless tobacco: Never   Vaping Use    Vaping status: Never Used   Substance and Sexual Activity    Alcohol use: Yes     Alcohol/week: 4.0 standard drinks of alcohol     Types: 2 Glasses of wine, 1 Cans of beer, 1 Shots of liquor per week     Comment: Occasional    Drug use: Never    Sexual activity: Yes        Physical Exam:     There were no vitals taken for this visit.    Constitutional: NAD. AOx3. Well-developed and Well-nourished.   Psychiatric: Normal mood/ affect/ behavior. Judgment and thought content normal.     Left Upper Extremity:     Inspection    Skin intact. No skin lesions. No obvious mass visualized. No crepitus to ROM.   Palpation    No instability to stress of the medial and lateral collateral ligaments. Mild tenderness to palpation over the radial head improved from prior. No palpable block to motion. No tenderness to palpation at the medial and lateral epicondylar regions, radial tunnel.  Biceps and Triceps are palpable and grossly intact. No tenderness to palpation over the wrist. No notable DRUJ instability upon DRUJ stress testing.      ROM      Left      AROM PROM   Elbow Extension:   0 0   Elbow Flexion:   150 150   Pronation:   85 85   Supination:    85     85        Neurovascular    Normal sensation in the median, ulnar, and radial nerve distribution. Normal motor function of muscles innervated by median/AIN, ulnar, and radial/PIN nerves.    Normally perfused hand(s).               Diagnostic Studies:  I reviewed the images independently from  the professional interpretation, and discussed my interpretation of the pertinent findings with patient/family.    Xrays of Left elbow 4V 11/7/24: On my independent review of the radiographs, there is evidence of a radial neck fracture with trace displacement and mild impaction.    MRI of the left elbow from 11/4/24 redemonstrates the above.      Xrays of left elbow taken today show healing radial neck fracture in stable position.    Assessment/Plan:  74 year old male with left radial neck fracture.    I reviewed the patient's electronic medical record, including the pertinent office notes, medical/surgical history, medications and images. I specifically reviewed the images noted above, independently and discussed my independent interpretation of these images in combination with the pertinent positive and negative findings in my clinical exam with the patient    left elbow radial neck fracture.    I again discussed with Ned that this is a minimally displaced radial neck fracture with no mechanical block to motion, and that these fractures heal reliably well with continued conservative treatment. He continues to feel better. He should maintain the 5lb weight lifting restriction for now.     Follow Up: 4-5 weeks for final eval, xrays of left elbow at that time    Ronald Ng MD   Hand, Wrist, & Elbow Surgery  [email or web address]         [1]   Allergies  Allergen Reactions    Adhesive Tape ITCHING     Redness/itching EKG stickers

## 2024-12-09 DIAGNOSIS — I73.9 PAD (PERIPHERAL ARTERY DISEASE) (HCC): ICD-10-CM

## 2024-12-09 DIAGNOSIS — I25.118 CORONARY ARTERY DISEASE OF NATIVE ARTERY OF NATIVE HEART WITH STABLE ANGINA PECTORIS (HCC): ICD-10-CM

## 2024-12-09 DIAGNOSIS — Z95.1 S/P CABG (CORONARY ARTERY BYPASS GRAFT): ICD-10-CM

## 2024-12-09 DIAGNOSIS — I77.9 BILATERAL CAROTID ARTERY DISEASE, UNSPECIFIED TYPE (HCC): ICD-10-CM

## 2024-12-09 DIAGNOSIS — E78.2 HYPERLIPIDEMIA, MIXED: ICD-10-CM

## 2024-12-09 DIAGNOSIS — I70.203 BILATERAL ATHEROSCLEROSIS OF LEGS (HCC): ICD-10-CM

## 2024-12-11 RX ORDER — EZETIMIBE 10 MG/1
10 TABLET ORAL NIGHTLY
Qty: 90 TABLET | Refills: 0 | Status: SHIPPED | OUTPATIENT
Start: 2024-12-11

## 2024-12-11 RX ORDER — ROSUVASTATIN CALCIUM 40 MG/1
40 TABLET, COATED ORAL NIGHTLY
Qty: 90 TABLET | Refills: 0 | Status: SHIPPED | OUTPATIENT
Start: 2024-12-11

## 2024-12-11 NOTE — TELEPHONE ENCOUNTER
Pt requesting refill of   Requested Prescriptions     Pending Prescriptions Disp Refills    ezetimibe 10 MG Oral Tab [Pharmacy Med Name: Ezetimibe Oral Tablet 10 MG] 90 tablet 0     Sig: TAKE 1 TABLET EVERY NIGHT    rosuvastatin 40 MG Oral Tab [Pharmacy Med Name: Rosuvastatin Calcium Oral Tablet 40 MG] 90 tablet 0     Sig: TAKE 1 TABLET EVERY NIGHT      Last Time Medication was Filled:    Ezetimibe 3/26/2024 90 days   Rosuvastatin 3/26/2024 90 days     Last Office Visit with Provider: 10/31/2024    Appt. scheduled on 3/27/2025

## 2024-12-25 DIAGNOSIS — I10 ESSENTIAL HYPERTENSION: ICD-10-CM

## 2024-12-25 DIAGNOSIS — E11.51 DM (DIABETES MELLITUS) TYPE II, CONTROLLED, WITH PERIPHERAL VASCULAR DISORDER (HCC): ICD-10-CM

## 2024-12-26 RX ORDER — LISINOPRIL 10 MG/1
10 TABLET ORAL DAILY
Qty: 90 TABLET | Refills: 0 | Status: SHIPPED | OUTPATIENT
Start: 2024-12-26

## 2024-12-26 RX ORDER — AMLODIPINE BESYLATE 5 MG/1
5 TABLET ORAL DAILY
Qty: 90 TABLET | Refills: 0 | Status: SHIPPED | OUTPATIENT
Start: 2024-12-26

## 2024-12-26 NOTE — TELEPHONE ENCOUNTER
Refill(s) Requested:   Requested Prescriptions     Pending Prescriptions Disp Refills    amLODIPine 5 MG Oral Tab [Pharmacy Med Name: amLODIPine Besylate Oral Tablet 5 MG] 90 tablet 0     Sig: TAKE 1 TABLET EVERY DAY    lisinopril 10 MG Oral Tab [Pharmacy Med Name: Lisinopril Oral Tablet 10 MG] 90 tablet 0     Sig: TAKE 1 TABLET EVERY DAY       Medication Last Prescribed:      Last Rx Date Dose Quantity Refills   Amlodipine 3/26/24    Lisinopril 10/10/24 5 mg    10 mg 90    90 1    0    Has dose or medication been changed    since last prescription? *Review notes* [ ] Yes  [ x] No     Last office visit: 10/31/2024 (in-office)  1/8/2024 (virtual visit)     Relevant details from LOV note: Continue current medication-metoprolol, lisinopril and amlodipine  Labs q 6 months     Relevant lab results:   Lab Results   Component Value Date    GLU 98 09/16/2024    BUN 16 09/16/2024    BUNCREA SEE NOTE: 09/20/2023    CREATSERUM 0.97 09/16/2024    ANIONGAP 7 09/16/2024    GFR 66 02/09/2017    GFRNAA 76 02/22/2022    GFRAA 88 02/22/2022    CA 9.9 09/16/2024    OSMOCALC 289 09/16/2024    ALKPHO 59 09/16/2024    AST 26 09/16/2024    ALT 41 09/16/2024    BILT 0.7 09/16/2024    TP 7.0 09/16/2024    ALB 4.4 09/16/2024    GLOBULIN 2.6 09/16/2024    AGRATIO 1.8 09/20/2023     09/16/2024    K 4.5 09/16/2024     09/16/2024    CO2 26.0 09/16/2024       Lab Results   Component Value Date    CHOLEST 129 09/16/2024    TRIG 95 09/16/2024    HDL 54 09/16/2024    LDL 57 09/16/2024    VLDL 14 09/16/2024    TCHDLRATIO 2.9 09/20/2023    NONHDLC 75 09/16/2024        Patient was asked to follow-up in: 6 months    Appointment due: March 2025    Future Appointments: 3/27/2025 Caitlin Samayoa, DO     Action taken:  [x] Medication refilled per protocol  [] Request routed to provider for review

## 2025-01-10 DIAGNOSIS — E11.51 DM (DIABETES MELLITUS) TYPE II, CONTROLLED, WITH PERIPHERAL VASCULAR DISORDER (HCC): ICD-10-CM

## 2025-01-13 RX ORDER — SEMAGLUTIDE 2.68 MG/ML
INJECTION, SOLUTION SUBCUTANEOUS
Qty: 3 EACH | Refills: 0 | Status: SHIPPED | OUTPATIENT
Start: 2025-01-13

## 2025-01-13 NOTE — TELEPHONE ENCOUNTER
Refill(s) Requested:   Requested Prescriptions     Pending Prescriptions Disp Refills    semaglutide (OZEMPIC, 2 MG/DOSE,) 8 MG/3ML Subcutaneous Solution Pen-injector [Pharmacy Med Name: Ozempic (2 MG/DOSE) Subcutaneous Solution Pen-injector 8 MG/3ML] 3 each 0     Sig: INJECT 2MG UNDER THE SKIN ONE TIME WEEKLY     Medication Last Prescribed: Ozempic 2 mg 9/20/2024 3 each 0 refills     Has dose or medication been changed    since last prescription? *Review notes*    []  Yes       []  No     Last office visit: 10/31/2024 (in-office)  Visit date not found (virtual visit)     Relevant details from LOV note: Controlled-A1c normal on Ozempic  Continue RX semaglutide, 2 mg weekly-and  PA program.  Off metformin  Diarrhea resolved  Continue daily foot exams  contintue regular exercise 3x weekly or more for 30 mins  Last ophthalmologist DM exam  Dr.Oppenheim-9//9/2024 required annual  Immunizations-encouraged vaccination with new COVID booster vaccine, RSV vaccine, flu shot  Urine microalbumin annual-9/24/2024  Foot exam annually.  If any sores or lesions will call office immediately  Repeat labs HgA1c, lipids, CMP in 3 months     Relevant lab results:   Lab Results   Component Value Date     09/16/2024    A1C 5.9 (H) 09/16/2024        Patient was asked to follow-up in: 6 months    Appointment due: March 2025    Future Appointments: 3/27/2025 Caitlin Samayoa DO     Action taken:  [x] Medication refilled per protocol

## 2025-01-16 ENCOUNTER — TELEPHONE (OUTPATIENT)
Dept: FAMILY MEDICINE CLINIC | Facility: CLINIC | Age: 75
End: 2025-01-16

## 2025-01-16 DIAGNOSIS — N40.0 BENIGN PROSTATIC HYPERPLASIA WITHOUT LOWER URINARY TRACT SYMPTOMS: Primary | ICD-10-CM

## 2025-01-16 DIAGNOSIS — N52.9 VASCULOGENIC ERECTILE DYSFUNCTION, UNSPECIFIED VASCULOGENIC ERECTILE DYSFUNCTION TYPE: ICD-10-CM

## 2025-01-16 NOTE — TELEPHONE ENCOUNTER
Patient calling for an updated referral for urology. Patient has an appointment today 1/16/25    Referral pended for review and signture

## 2025-01-17 ENCOUNTER — HOSPITAL ENCOUNTER (OUTPATIENT)
Facility: HOSPITAL | Age: 75
Setting detail: HOSPITAL OUTPATIENT SURGERY
Discharge: HOME | End: 2025-01-17
Attending: INTERNAL MEDICINE | Admitting: INTERNAL MEDICINE
Payer: MEDICARE

## 2025-01-17 ENCOUNTER — ANESTHESIA (OUTPATIENT)
Dept: ENDOSCOPY | Facility: HOSPITAL | Age: 75
End: 2025-01-17
Payer: MEDICARE

## 2025-01-17 ENCOUNTER — ANESTHESIA EVENT (OUTPATIENT)
Dept: ENDOSCOPY | Facility: HOSPITAL | Age: 75
End: 2025-01-17
Payer: MEDICARE

## 2025-01-17 VITALS
DIASTOLIC BLOOD PRESSURE: 64 MMHG | OXYGEN SATURATION: 95 % | BODY MASS INDEX: 34.06 KG/M2 | SYSTOLIC BLOOD PRESSURE: 125 MMHG | RESPIRATION RATE: 18 BRPM | TEMPERATURE: 98 F | HEART RATE: 58 BPM | HEIGHT: 73 IN | WEIGHT: 257 LBS

## 2025-01-17 DIAGNOSIS — Z86.0100 PERSONAL HISTORY OF COLON POLYPS, UNSPECIFIED: ICD-10-CM

## 2025-01-17 LAB — GLUCOSE BLD-MCNC: 102 MG/DL (ref 70–99)

## 2025-01-17 PROCEDURE — 82962 GLUCOSE BLOOD TEST: CPT

## 2025-01-17 PROCEDURE — 88305 TISSUE EXAM BY PATHOLOGIST: CPT | Performed by: INTERNAL MEDICINE

## 2025-01-17 PROCEDURE — 0DBL8ZX EXCISION OF TRANSVERSE COLON, VIA NATURAL OR ARTIFICIAL OPENING ENDOSCOPIC, DIAGNOSTIC: ICD-10-PCS | Performed by: INTERNAL MEDICINE

## 2025-01-17 RX ORDER — DEXTROSE MONOHYDRATE 25 G/50ML
50 INJECTION, SOLUTION INTRAVENOUS
Status: DISCONTINUED | OUTPATIENT
Start: 2025-01-17 | End: 2025-01-17

## 2025-01-17 RX ORDER — LIDOCAINE HYDROCHLORIDE 10 MG/ML
INJECTION, SOLUTION EPIDURAL; INFILTRATION; INTRACAUDAL; PERINEURAL AS NEEDED
Status: DISCONTINUED | OUTPATIENT
Start: 2025-01-17 | End: 2025-01-17 | Stop reason: SURG

## 2025-01-17 RX ORDER — NICOTINE POLACRILEX 4 MG
15 LOZENGE BUCCAL
Status: DISCONTINUED | OUTPATIENT
Start: 2025-01-17 | End: 2025-01-17

## 2025-01-17 RX ORDER — SODIUM CHLORIDE, SODIUM LACTATE, POTASSIUM CHLORIDE, CALCIUM CHLORIDE 600; 310; 30; 20 MG/100ML; MG/100ML; MG/100ML; MG/100ML
INJECTION, SOLUTION INTRAVENOUS CONTINUOUS
Status: DISCONTINUED | OUTPATIENT
Start: 2025-01-17 | End: 2025-01-17

## 2025-01-17 RX ORDER — ONDANSETRON 2 MG/ML
4 INJECTION INTRAMUSCULAR; INTRAVENOUS ONCE AS NEEDED
Status: DISCONTINUED | OUTPATIENT
Start: 2025-01-17 | End: 2025-01-17

## 2025-01-17 RX ORDER — NALOXONE HYDROCHLORIDE 0.4 MG/ML
0.08 INJECTION, SOLUTION INTRAMUSCULAR; INTRAVENOUS; SUBCUTANEOUS ONCE AS NEEDED
Status: DISCONTINUED | OUTPATIENT
Start: 2025-01-17 | End: 2025-01-17

## 2025-01-17 RX ORDER — NICOTINE POLACRILEX 4 MG
30 LOZENGE BUCCAL
Status: DISCONTINUED | OUTPATIENT
Start: 2025-01-17 | End: 2025-01-17

## 2025-01-17 RX ADMIN — SODIUM CHLORIDE, SODIUM LACTATE, POTASSIUM CHLORIDE, CALCIUM CHLORIDE: 600; 310; 30; 20 INJECTION, SOLUTION INTRAVENOUS at 10:08:00

## 2025-01-17 RX ADMIN — LIDOCAINE HYDROCHLORIDE 30 MG: 10 INJECTION, SOLUTION EPIDURAL; INFILTRATION; INTRACAUDAL; PERINEURAL at 10:13:00

## 2025-01-17 RX ADMIN — SODIUM CHLORIDE, SODIUM LACTATE, POTASSIUM CHLORIDE, CALCIUM CHLORIDE: 600; 310; 30; 20 INJECTION, SOLUTION INTRAVENOUS at 10:35:00

## 2025-01-17 NOTE — OPERATIVE REPORT
Jatinder Ennis Patient Status:  Hospital Outpatient Surgery    1950 MRN [id number]   [facility] ENDOSCOPY PAIN CENTER Attending Alberto Stokes DO   Hosp Day # 0 PCP Caitlin Samayoa DO       PREOPERATIVE DIAGNOSIS/INDICATION: History of large ascending colon polyp s/p endoscopic mucosal resection presents for surveillance  POSTOPERTATIVE DIAGNOSIS: See Impression  PROCEDURE PERFORMED: COLONOSCOPY with SNARE  DATE: 24  SEDATION: MAC sedation provided by General Anesthesia    TIME OUT WAS PERFORMED    INFORMED CONSENT: I have discussed the risks, benefits, and alternatives to colonoscopy with the patient including but not limited to the risks of bleeding, infection, pain, as well as the risks of anesthesia and perforation all possibly leading to prolonged hospitalization, surgical intervention. I explained that 5-10 % of polyps may be missed even in the best of all circumstances. All questions were answered to the patient’s satisfaction. The patient elected to proceed with colonoscopy with intervention as indicated.  PROCEDURE DESCRIPTION: After careful digital rectal examination a  colonoscope was introduced into the patients rectum, advanced beyond the recto sigmoid junction, into the descending colon, splenic flexure, transverse colon, hepatic flexure, ascending colon, cecum and the last 5-10 cm of the terminal ileum, confirmed by landmarks, including the appendiceal orifice and ileocecal valve. Careful examination of the above described areas was performed on withdrawal of the endoscope. Retroflexion was performed in the rectum. The patient tolerated the procedure well.  There were no immediate complications immediately following the procedure.  The patient was transferred to recovery in stable condition.  QUALITY OF PREPARATION: [place] Bowel Preparation Scale:    Right colon 3, Transverse colon 3, Left colon 3   FINDINGS:  Terminal Ileum: Normal mucosa  Cecum: The mucosa and  vascular pattern were normal.   Ascending colon: No residual polyp tissue at EMR site.   Transverse colon: 3 mm sessile polyp s/p cold snare polypectomy.   Descending colon: The mucosa and vascular pattern were normal.  Sigmoid colon: Diverticulosis.   Rectum: The mucosa and vascular pattern were normal. Retroflexion revealed small internal hemorrhoids.     IMPRESSION:   1. No residual polyp tissue at EMR site.    2. Colon Polyp.    3. Diverticulosis.    4. Internal Hemorrhoids.     RECOMMENDATIONS:   1. Await pathology results.    2. Repeat colonoscopy in 3 years.       WARNER [facility]  Gastroenterology/Advanced Endoscopy  [facility]

## 2025-01-17 NOTE — DISCHARGE INSTRUCTIONS
Home Care Instructions for Colonoscopy with Sedation    Diet:  - Resume your regular diet   - Start with light meals to minimize bloating.  - Do not drink alcohol today.    Medication:  - If you have questions about resuming your normal medications, please contact your Primary Care Physician.  -  Resume your Plavix tomorrow on: 1/18/24    Activities:  - Take it easy today. Do not return to work today.  - Do not drive today.  - Do not operate any machinery today (including kitchen equipment).    Colonoscopy:  - You may notice some rectal \"spotting\" (a little blood on the toilet tissue) for a day or two after the exam. This is normal.  - If you experience any rectal bleeding (not spotting), persistent tenderness or sharp severe abdominal pains, oral temperature over 100 degrees Fahrenheit, light-headedness or dizziness, or any other problems, contact your doctor.    **If unable to reach your doctor, please go to the [facility] Emergency Room**    - Your referring physician will receive a full report of your examination.  - If you do not hear from your doctor's office within two weeks of your biopsy, please call them for your results.

## 2025-01-17 NOTE — ANESTHESIA POSTPROCEDURE EVALUATION
[facility]    Jatinder Ennis Patient Status:  Hospital Outpatient Surgery   Age/Gender 74 year old male MRN [id number]   Location [facility] ENDOSCOPY PAIN CENTER Attending Alberto Stokes DO   Hosp Day # 0 PCP Caitlin Samayoa DO       Anesthesia Post-op Note    COLONOSCOPY with cold snare polypectomy    Procedure Summary       Date: 01/17/25 Room / Location:  ENDOSCOPY 04 /  ENDOSCOPY    Anesthesia Start: 1008 Anesthesia Stop: 1035    Procedure: COLONOSCOPY with cold snare polypectomy Diagnosis:       Personal history of colon polyps, unspecified      (polyp, diverticulosis, hemorrhoids)    Surgeons: Alberto Stokes DO Anesthesiologist: Oli Brennan MD    Anesthesia Type: MAC ASA Status: 3            Anesthesia Type: MAC    Vitals Value Taken Time   /55 01/17/25 1035   Temp  01/17/25 1035   Pulse 58 01/17/25 1034   Resp 16 01/17/25 1035   SpO2 91 % 01/17/25 1034   Vitals shown include unfiled device data.        Patient Location: Endoscopy    Anesthesia Type: MAC    Airway Patency: patent    Postop Pain Control: adequate    Mental Status: mildly sedated but able to meaningfully participate in the post-anesthesia evaluation    Nausea/Vomiting: none    Cardiopulmonary/Hydration status: stable euvolemic    Complications: no apparent anesthesia related complications    Postop vital signs: stable    Dental Exam: Unchanged from Postop

## 2025-01-17 NOTE — ANESTHESIA PREPROCEDURE EVALUATION
PRE-OP EVALUATION    Patient Name: Jatinder Ennis    Admit Diagnosis: Personal history of colon polyps, unspecified [Z86.0100]    Pre-op Diagnosis: Personal history of colon polyps, unspecified [Z86.0100]    COLONOSCOPY    Anesthesia Procedure: COLONOSCOPY    Surgeons and Role:     * Alberto Stokes, DO - Primary    Pre-op vitals reviewed.        Body mass index is 33.91 kg/m².    Current medications reviewed.  Hospital Medications:   glucose (Dex4) 15 GM/59ML oral liquid 15 g  15 g Oral Q15 Min PRN    Or    glucose (Glutose) 40% oral gel 15 g  15 g Oral Q15 Min PRN    Or    glucose-vitamin C (Dex-4) chewable tab 4 tablet  4 tablet Oral Q15 Min PRN    Or    dextrose 50% injection 50 mL  50 mL Intravenous Q15 Min PRN    Or    glucose (Dex4) 15 GM/59ML oral liquid 30 g  30 g Oral Q15 Min PRN    Or    glucose (Glutose) 40% oral gel 30 g  30 g Oral Q15 Min PRN    Or    glucose-vitamin C (Dex-4) chewable tab 8 tablet  8 tablet Oral Q15 Min PRN    lactated ringers infusion   Intravenous Continuous       Outpatient Medications:   Prescriptions Prior to Admission[1]    Allergies: Adhesive tape      Anesthesia Evaluation    Patient summary reviewed.    Anesthetic Complications  (-) history of anesthetic complications         GI/Hepatic/Renal                                 Cardiovascular      ECG reviewed.  Exercise tolerance: good     MET: >4      (+) hypertension   (+) hyperlipidemia  (+) CAD  (+) past MI  (+) CABG/stent            (+) CHF                Endo/Other      (+) diabetes                     (+) arthritis       Pulmonary                    (+) sleep apnea       Neuro/Psych                 (+) neuromuscular disease             Patient Active Problem List:     Erectile dysfunction     Coronary artery disease of native artery of native heart with stable angina pectoris (HCC)     BPH (benign prostatic hyperplasia)     S/P CABG (coronary artery bypass graft)     Bilateral atherosclerosis of legs (HCC)     Carotid  artery disease (HCC)     Diverticulosis     Fatty liver     AAA (abdominal aortic aneurysm) (HCC)     Osteoarthritis of spine with radiculopathy, cervical region     Foraminal stenosis of cervical region     Facet arthritis of cervical region     DM (diabetes mellitus) type II, controlled, with peripheral vascular disorder (HCC)     Sensorineural hearing loss     KRISTEN on CPAP     Arthritis of lumbar spine     Hyperlipidemia, mixed     Tubular adenoma of colon     Martinez's esophagus determined by endoscopy     Lumbar degenerative disc disease     PAD (peripheral artery disease) (HCC)     Essential hypertension     Status post insertion of iliac artery stent     Aortoiliac occlusive disease (HCC)     BMI 33.0-33.9,adult     Chronic pain of right knee     Weight gain     Asymptomatic microscopic hematuria     Primary osteoarthritis of left knee     History of fall     Elbow swelling, left     Left elbow pain            Past Surgical History:   Procedure Laterality Date    Angiogram      stents in aorta    Angioplasty (coronary)      Cabg  2003    X 4 vessels    Cataract  2018    Right eye-Dr. Oppenheim    Cataract      Cath bare metal stent (bms)      Cholecystectomy  2003    Colonoscopy  2003    Colonoscopy N/A 01/30/2015    Procedure: COLONOSCOPY;  Surgeon: Bayron Barlow MD;  Location:  ENDOSCOPY    Colonoscopy N/A 01/17/2020    Procedure: COLONOSCOPY;  Surgeon: Alberto Stokes DO;  Location:  ENDOSCOPY    Colonoscopy N/A 03/03/2021    Procedure: COLONOSCOPY;  Surgeon: Alberto Stokes DO;  Location:  ENDOSCOPY    Colonoscopy N/A 01/14/2022    Procedure: COLONOSCOPY with colds snare polypectomy;  Surgeon: Alberto Stokes DO;  Location:  ENDOSCOPY    Cystoscopy,insert ureteral stent  12/2014    Other surgical history  2014    passed kidney stone via stent    Tonsillectomy      Vasectomy       Social History     Socioeconomic History    Marital status:    Tobacco Use    Smoking status: Former      Current packs/day: 0.00     Average packs/day: 1 pack/day for 40.0 years (40.0 ttl pk-yrs)     Types: Cigarettes     Start date: 1967     Quit date: 2003     Years since quittin.7    Smokeless tobacco: Never   Vaping Use    Vaping status: Never Used   Substance and Sexual Activity    Alcohol use: Yes     Alcohol/week: 4.0 standard drinks of alcohol     Types: 2 Glasses of wine, 1 Cans of beer, 1 Shots of liquor per week     Comment: Occasional    Drug use: Never    Sexual activity: Yes   Other Topics Concern    Caffeine Concern No    Stress Concern No    Weight Concern Yes    Special Diet No    Exercise Yes    Seat Belt No     History   Drug Use Unknown     Available pre-op labs reviewed.               Airway      Mallampati: II  Mouth opening: >3 FB  TM distance: 4 - 6 cm  Neck ROM: full Cardiovascular      Rhythm: regular  Rate: normal     Dental      Dental appliance(s): upper dentures and partials       Pulmonary      Breath sounds clear to auscultation bilaterally.               Other findings              ASA: 3   Plan: MAC  NPO status verified and patient meets guidelines.    Post-procedure pain management plan discussed with surgeon and patient.      Plan/risks discussed with: patient and spouse                Present on Admission:  **None**             [1]   Medications Prior to Admission   Medication Sig Dispense Refill Last Dose/Taking    pantoprazole 40 MG Oral Tab EC TAKE 1 TABLET EVERY DAY 90 tablet 0 Taking    amLODIPine 5 MG Oral Tab TAKE 1 TABLET EVERY DAY 90 tablet 0 Taking    lisinopril 10 MG Oral Tab TAKE 1 TABLET EVERY DAY 90 tablet 0 Taking    ezetimibe 10 MG Oral Tab TAKE 1 TABLET EVERY NIGHT 90 tablet 0 Taking    rosuvastatin 40 MG Oral Tab TAKE 1 TABLET EVERY NIGHT 90 tablet 0 Taking    metoprolol succinate ER 50 MG Oral Tablet 24 Hr Take 1 tablet (50 mg total) by mouth daily. 90 tablet 3 Taking    tamsulosin 0.4 MG Oral Cap Take 2 capsules (0.8 mg total) by mouth daily. 180  capsule 0 Taking    Acetaminophen ER (TYLENOL 8 HOUR ARTHRITIS PAIN) 650 MG Oral Tab CR Take 2 tablets (1,300 mg total) by mouth every 8 (eight) hours as needed for Pain (Max is 6 tablets in 24 hours or 4000 mg of Tylenol/acetaminophen in 24 hours). 180 tablet 1 Taking As Needed    diclofenac 1 % External Gel Apply 2 g topically 4 (four) times daily. Buy otc (Patient taking differently: Apply 2 g topically as needed. Buy otc) 150 g 1 Taking Differently    Vitamin D3, Cholecalciferol, 50 MCG (2000 UT) Oral Tab vitamin D3  2000 IUs take 1 tablet daily 365 tablet 0 Taking    Clopidogrel Bisulfate 75 MG Oral Tab Take 1 tablet (75 mg total) by mouth daily. 30 tablet 0 Taking    aspirin 81 MG Oral Tab EC Take 1 tablet (81 mg total) by mouth daily.   Taking    semaglutide (OZEMPIC, 2 MG/DOSE,) 8 MG/3ML Subcutaneous Solution Pen-injector INJECT 2MG UNDER THE SKIN ONE TIME WEEKLY 3 each 0     PEG 3350-KCl-Na Bicarb-NaCl 420 g Oral Recon Soln Take as directed by physician 4000 mL 0     nitroglycerin 0.4 MG Sublingual SL Tab TAKE 1 TABLET SUBLINGUAL ONCE A DAY AS NEEDED FOR CHEST PAIN       sildenafil 20 MG Oral Tab TAKE 5 TABLETS BY MOUTH 1 HOUR BEFORE SEXUAL ACTIVITY. DO NOT EXCEED 5 TABLETS PER 24 HOURS

## 2025-01-17 NOTE — H&P
History & Physical Examination    Patient Name: Jatinder Ennis  MRN: [id number]  [facility]: [id number]  YOB: 1950    Diagnosis: history of colon polyps    Present Illness: 75 y/o M history as above presents for Colonoscopy.     Prescriptions Prior to Admission[1]  Current Facility-Administered Medications   Medication Dose Route Frequency    glucose (Dex4) 15 GM/59ML oral liquid 15 g  15 g Oral Q15 Min PRN    Or    glucose (Glutose) 40% oral gel 15 g  15 g Oral Q15 Min PRN    Or    glucose-vitamin C (Dex-4) chewable tab 4 tablet  4 tablet Oral Q15 Min PRN    Or    dextrose 50% injection 50 mL  50 mL Intravenous Q15 Min PRN    Or    glucose (Dex4) 15 GM/59ML oral liquid 30 g  30 g Oral Q15 Min PRN    Or    glucose (Glutose) 40% oral gel 30 g  30 g Oral Q15 Min PRN    Or    glucose-vitamin C (Dex-4) chewable tab 8 tablet  8 tablet Oral Q15 Min PRN    lactated ringers infusion   Intravenous Continuous       Allergies: Allergies[2]    Past Medical History:    AAA (abdominal aortic aneurysm) (HCC)    Arthritis    Atherosclerosis of coronary artery    Back pain    Martinez's esophagus determined by endoscopy    [id number], biopsies positive for Martinez's -aspirin decreased to 81 mg from 325 mg at last endoscopy in 2020.  Repeat endoscopy in 3 years-scheduled EGD 4/19/2023    Bilateral hip pain    Bleeding nose    BPH (benign prostatic hyperplasia)    Calculus of kidney    Carotid stenosis    mild    Cataract    Cataract, left    already had R eye update 2016    Cervical disc herniation    Change in hair    Started going grey.    Chest pain on exertion    Drs could not get stint put in    Congestive heart disease (HCC)    Coronary atherosclerosis    COVID-19 virus detected    patient has loss of taste and smell    Diastasis recti    ventral    Disorder of prostate    Diverticulosis    Easy bruising    Erythrocytosis    Frequent urination    Hearing impairment    bilateral hearing aids    Hearing loss     Heart attack (HCC)    Heel spur, left    Hemorrhoids    High blood pressure    High cholesterol    History of kidney stones    Hyperlipidemia    Obesity (BMI 30-39.9)    Obstructive sleep apnea (adult) (pediatric)    AHI 35.8 autoPAP 10-20 HME    KRISTEN on CPAP    Osteoarthrosis, unspecified whether generalized or localized, unspecified site    Pain in joints    Peripheral vascular disease (HCC)    Personal history of other diseases of circulatory system    CAD    Plantar fasciitis    Polycythemia    History of polycythemia due to obstructive sleep apnea and followed previously by Dr. Celaya.  Denies headache or abdominal pain.  Denies bruising.  Last CBC 1/15/2019 is normal.  Recommend annual CBC by hematology.    PVD (peripheral vascular disease) (HCC)    On screening 2014 legs erum    S/P CABG (coronary artery bypass graft)    Shortness of breath    with exertion    Sleep apnea    Stented coronary artery    Not in heart, off of aorta to both legs    Trochanteric bursitis of right hip    Type II or unspecified type diabetes mellitus without mention of complication, not stated as uncontrolled     Past Surgical History:   Procedure Laterality Date    Angiogram      stents in aorta    Angioplasty (coronary)      Cabg  2003    X 4 vessels    Cataract  2018    Right eye-Dr. Oppenheim    Cataract      Cath bare metal stent (bms)      Cholecystectomy  2003    Colonoscopy  2003    Colonoscopy N/A 01/30/2015    Procedure: COLONOSCOPY;  Surgeon: Bayron Barlow MD;  Location:  ENDOSCOPY    Colonoscopy N/A 01/17/2020    Procedure: COLONOSCOPY;  Surgeon: Alberto Stokes DO;  Location:  ENDOSCOPY    Colonoscopy N/A 03/03/2021    Procedure: COLONOSCOPY;  Surgeon: Alberto Stokes DO;  Location:  ENDOSCOPY    Colonoscopy N/A 01/14/2022    Procedure: COLONOSCOPY with colds snare polypectomy;  Surgeon: Alberto Stokes DO;  Location:  ENDOSCOPY    Cystoscopy,insert ureteral stent  12/2014    Other surgical history       passed kidney stone via stent    Tonsillectomy      Vasectomy       Family History   Problem Relation Age of Onset    Arthritis Father     Cancer Father     Heart Disorder Father     Heart Attack Father     Other (CAD) Father     Other (lung cancer) Father     Arthritis Mother     Diabetes Mother     Other (diabetes mellitus) Mother      Social History     Tobacco Use    Smoking status: Former     Current packs/day: 0.00     Average packs/day: 1 pack/day for 40.0 years (40.0 ttl pk-yrs)     Types: Cigarettes     Start date: 1967     Quit date: 2003     Years since quittin.7    Smokeless tobacco: Never   Substance Use Topics    Alcohol use: Yes     Alcohol/week: 4.0 standard drinks of alcohol     Types: 2 Glasses of wine, 1 Cans of beer, 1 Shots of liquor per week     Comment: Occasional       SYSTEM Check if Review is Normal Check if Physical Exam is Normal If not normal, please explain:   HEENT [ x] [x ]    NECK & BACK [ x] [ x]    HEART [ x] [x ]    LUNGS [ x] [ x]    ABDOMEN [ x] [x ]    UROGENITAL [ n/a] [ n/a]    EXTREMITIES [ x] [x ]    OTHER        [ x ] I have discussed the risks and benefits and alternatives with the patient/family.  They understand and agree to proceed with plan of care.  [ x ] I have reviewed the History and Physical done within the last 30 days.  Any changes noted above.    Alberto Stokes DO  2025  10:05 AM             [1]   Medications Prior to Admission   Medication Sig Dispense Refill Last Dose/Taking    pantoprazole 40 MG Oral Tab EC TAKE 1 TABLET EVERY DAY 90 tablet 0 2025    amLODIPine 5 MG Oral Tab TAKE 1 TABLET EVERY DAY 90 tablet 0 2025 Morning    lisinopril 10 MG Oral Tab TAKE 1 TABLET EVERY DAY 90 tablet 0 2025 Morning    ezetimibe 10 MG Oral Tab TAKE 1 TABLET EVERY NIGHT 90 tablet 0 2025    rosuvastatin 40 MG Oral Tab TAKE 1 TABLET EVERY NIGHT 90 tablet 0 2025    metoprolol succinate ER 50 MG Oral Tablet 24 Hr Take 1  tablet (50 mg total) by mouth daily. 90 tablet 3 1/17/2025 Morning    tamsulosin 0.4 MG Oral Cap Take 2 capsules (0.8 mg total) by mouth daily. 180 capsule 0 1/16/2025    Acetaminophen ER (TYLENOL 8 HOUR ARTHRITIS PAIN) 650 MG Oral Tab CR Take 2 tablets (1,300 mg total) by mouth every 8 (eight) hours as needed for Pain (Max is 6 tablets in 24 hours or 4000 mg of Tylenol/acetaminophen in 24 hours). 180 tablet 1 Past Week    diclofenac 1 % External Gel Apply 2 g topically 4 (four) times daily. Buy otc (Patient taking differently: Apply 2 g topically as needed. Buy otc) 150 g 1 Taking Differently    Vitamin D3, Cholecalciferol, 50 MCG (2000 UT) Oral Tab vitamin D3  2000 IUs take 1 tablet daily 365 tablet 0 Past Week    Clopidogrel Bisulfate 75 MG Oral Tab Take 1 tablet (75 mg total) by mouth daily. 30 tablet 0 1/11/2025    aspirin 81 MG Oral Tab EC Take 1 tablet (81 mg total) by mouth daily.   1/12/2025    semaglutide (OZEMPIC, 2 MG/DOSE,) 8 MG/3ML Subcutaneous Solution Pen-injector INJECT 2MG UNDER THE SKIN ONE TIME WEEKLY 3 each 0 1/6/2025    PEG 3350-KCl-Na Bicarb-NaCl 420 g Oral Recon Soln Take as directed by physician 4000 mL 0     nitroglycerin 0.4 MG Sublingual SL Tab TAKE 1 TABLET SUBLINGUAL ONCE A DAY AS NEEDED FOR CHEST PAIN       sildenafil 20 MG Oral Tab TAKE 5 TABLETS BY MOUTH 1 HOUR BEFORE SEXUAL ACTIVITY. DO NOT EXCEED 5 TABLETS PER 24 HOURS      [2]   Allergies  Allergen Reactions    Adhesive Tape ITCHING     Redness/itching EKG stickers

## 2025-01-28 ENCOUNTER — TELEPHONE (OUTPATIENT)
Dept: FAMILY MEDICINE CLINIC | Facility: CLINIC | Age: 75
End: 2025-01-28

## 2025-01-28 NOTE — TELEPHONE ENCOUNTER
Prior authorization submitted to patient's plan via Epic on 1/28/2025.         Prior authorization approved  Payer: [employer]    [phone]    [phone]  Note from payer: Authorization already on file for this request. Authorization starting on 01/01/2025 and ending on 12/31/2025.  Approval Details    Authorized from January 1, 2025 to December 31, 2025  Electronic appeal: Not supported  View History

## 2025-01-30 DIAGNOSIS — E11.51 DM (DIABETES MELLITUS) TYPE II, CONTROLLED, WITH PERIPHERAL VASCULAR DISORDER (HCC): Primary | ICD-10-CM

## 2025-01-30 NOTE — TELEPHONE ENCOUNTER
New prescription request [facility] pharmacy incoming fax received requesting.     True Metrix Air Glucose Meter and [employer] Ture Metrix test Steps.         Order pended.       Ph:[phone]  Fax:[phone]

## 2025-01-31 DIAGNOSIS — E11.51 DM (DIABETES MELLITUS) TYPE II, CONTROLLED, WITH PERIPHERAL VASCULAR DISORDER (HCC): ICD-10-CM

## 2025-01-31 RX ORDER — CALCIUM CITRATE/VITAMIN D3 200MG-6.25
TABLET ORAL
Qty: 100 STRIP | Refills: 3 | Status: SHIPPED | OUTPATIENT
Start: 2025-01-31

## 2025-01-31 RX ORDER — BLOOD-GLUCOSE METER
1 EACH MISCELLANEOUS 2 TIMES DAILY
Qty: 1 EACH | Refills: 0 | Status: SHIPPED | OUTPATIENT
Start: 2025-01-31

## 2025-02-03 RX ORDER — SEMAGLUTIDE 2.68 MG/ML
2 INJECTION, SOLUTION SUBCUTANEOUS WEEKLY
Qty: 9 EACH | Refills: 0 | Status: SHIPPED | OUTPATIENT
Start: 2025-02-03

## 2025-02-03 NOTE — TELEPHONE ENCOUNTER
Refill(s) Requested:   Requested Prescriptions     Pending Prescriptions Disp Refills    semaglutide (OZEMPIC, 2 MG/DOSE,) 8 MG/3ML Subcutaneous Solution Pen-injector [Pharmacy Med Name: Ozempic (2 MG/DOSE) Subcutaneous Solution Pen-injector 8 MG/3ML] 9 each 0     Sig: INJECT 2 MG INTO THE SKIN ONCE A WEEK.     Medication Last Prescribed: 1/13/2025 3 each 0 refills     Has dose or medication been changed    since last prescription? *Review notes*    []  Yes       [x]  No     Last office visit: 10/31/2024 (in-office)  Visit date not found (virtual visit)     Relevant details from LOV note: Controlled-A1c normal on Ozempic  Continue RX semaglutide, 2 mg weekly-and  PA program.  Off metformin  Diarrhea resolved  Continue daily foot exams  contintue regular exercise 3x weekly or more for 30 mins  Last ophthalmologist DM exam  Dr.Oppenheim-9//9/2024 required annual  Immunizations-encouraged vaccination with new COVID booster vaccine, RSV vaccine, flu shot  Urine microalbumin annual-9/24/2024  Foot exam annually.  If any sores or lesions will call office immediately  Repeat labs HgA1c, lipids, CMP in 3 months     Relevant lab results:   Lab Results   Component Value Date     09/16/2024    A1C 5.9 (H) 09/16/2024        Patient was asked to follow-up in: 6 months    Appointment due: March 2025    Future Appointments: 3/27/2025 Caitlin Samayoa, DO     Action taken   [x] Request routed to provider for review - [ ] Medication not on protocol

## 2025-03-26 LAB
ABSOLUTE BASOPHILS: 53 CELLS/UL (ref 0–200)
ABSOLUTE EOSINOPHILS: 225 CELLS/UL (ref 15–500)
ABSOLUTE LYMPHOCYTES: 2498 CELLS/UL (ref 850–3900)
ABSOLUTE MONOCYTES: 675 CELLS/UL (ref 200–950)
ABSOLUTE NEUTROPHILS: 4050 CELLS/UL (ref 1500–7800)
ALBUMIN/GLOBULIN RATIO: 1.8 (CALC) (ref 1–2.5)
ALBUMIN: 4.3 G/DL (ref 3.6–5.1)
ALKALINE PHOSPHATASE: 54 U/L (ref 35–144)
ALT: 29 U/L (ref 9–46)
AST: 21 U/L (ref 10–35)
BASOPHILS: 0.7 %
BILIRUBIN, TOTAL: 0.6 MG/DL (ref 0.2–1.2)
BUN: 17 MG/DL (ref 7–25)
CALCIUM: 9.8 MG/DL (ref 8.6–10.3)
CARBON DIOXIDE: 26 MMOL/L (ref 20–32)
CHLORIDE: 105 MMOL/L (ref 98–110)
CHOL/HDLC RATIO: 2.8 (CALC)
CHOLESTEROL, TOTAL: 133 MG/DL
CREATININE, RANDOM URINE: 141 MG/DL (ref 20–320)
CREATININE: 0.96 MG/DL (ref 0.7–1.28)
EGFR: 83 ML/MIN/1.73M2
EOSINOPHILS: 3 %
GLOBULIN: 2.4 G/DL (CALC) (ref 1.9–3.7)
GLUCOSE: 103 MG/DL (ref 65–99)
HDL CHOLESTEROL: 47 MG/DL
HEMATOCRIT: 48.8 % (ref 38.5–50)
HEMOGLOBIN A1C: 5.9 % OF TOTAL HGB
HEMOGLOBIN: 16.3 G/DL (ref 13.2–17.1)
LDL-CHOLESTEROL: 65 MG/DL (CALC)
LYMPHOCYTES: 33.3 %
MCH: 30.7 PG (ref 27–33)
MCHC: 33.4 G/DL (ref 32–36)
MCV: 91.9 FL (ref 80–100)
MICROALBUMIN/CREATININE RATIO, RANDOM URINE: 4 MG/G CREAT
MICROALBUMIN: 0.5 MG/DL
MONOCYTES: 9 %
MPV: 9.5 FL (ref 7.5–12.5)
NEUTROPHILS: 54 %
NON-HDL CHOLESTEROL: 86 MG/DL (CALC)
PLATELET COUNT: 249 THOUSAND/UL (ref 140–400)
POTASSIUM: 4.8 MMOL/L (ref 3.5–5.3)
PROTEIN, TOTAL: 6.7 G/DL (ref 6.1–8.1)
RDW: 12.7 % (ref 11–15)
RED BLOOD CELL COUNT: 5.31 MILLION/UL (ref 4.2–5.8)
SODIUM: 139 MMOL/L (ref 135–146)
TRIGLYCERIDES: 125 MG/DL
TSH W/REFLEX TO FT4: 1.51 MIU/L (ref 0.4–4.5)
WHITE BLOOD CELL COUNT: 7.5 THOUSAND/UL (ref 3.8–10.8)

## 2025-03-27 ENCOUNTER — OFFICE VISIT (OUTPATIENT)
Dept: FAMILY MEDICINE CLINIC | Facility: CLINIC | Age: 75
End: 2025-03-27
Payer: MEDICARE

## 2025-03-27 VITALS
OXYGEN SATURATION: 95 % | RESPIRATION RATE: 22 BRPM | BODY MASS INDEX: 33.78 KG/M2 | DIASTOLIC BLOOD PRESSURE: 70 MMHG | SYSTOLIC BLOOD PRESSURE: 130 MMHG | WEIGHT: 263.19 LBS | TEMPERATURE: 97 F | HEIGHT: 73.9 IN | HEART RATE: 70 BPM

## 2025-03-27 DIAGNOSIS — K22.70 BARRETT'S ESOPHAGUS DETERMINED BY ENDOSCOPY: ICD-10-CM

## 2025-03-27 DIAGNOSIS — G47.33 OSA ON CPAP: ICD-10-CM

## 2025-03-27 DIAGNOSIS — Z12.5 SCREENING FOR MALIGNANT NEOPLASM OF PROSTATE: ICD-10-CM

## 2025-03-27 DIAGNOSIS — I73.9 PAD (PERIPHERAL ARTERY DISEASE): ICD-10-CM

## 2025-03-27 DIAGNOSIS — E11.51 TYPE 2 DIABETES MELLITUS WITH DIABETIC PERIPHERAL ANGIOPATHY WITHOUT GANGRENE, WITH LONG-TERM CURRENT USE OF INSULIN (HCC): ICD-10-CM

## 2025-03-27 DIAGNOSIS — Z00.00 ENCOUNTER FOR ANNUAL HEALTH EXAMINATION: Primary | ICD-10-CM

## 2025-03-27 DIAGNOSIS — I77.9 BILATERAL CAROTID ARTERY DISEASE, UNSPECIFIED TYPE: ICD-10-CM

## 2025-03-27 DIAGNOSIS — I10 ESSENTIAL HYPERTENSION: ICD-10-CM

## 2025-03-27 DIAGNOSIS — M51.360 DEGENERATION OF INTERVERTEBRAL DISC OF LUMBAR REGION WITH DISCOGENIC BACK PAIN: ICD-10-CM

## 2025-03-27 DIAGNOSIS — Z95.1 S/P CABG (CORONARY ARTERY BYPASS GRAFT): ICD-10-CM

## 2025-03-27 DIAGNOSIS — I71.43 INFRARENAL ABDOMINAL AORTIC ANEURYSM (AAA) WITHOUT RUPTURE: ICD-10-CM

## 2025-03-27 DIAGNOSIS — Z95.828 STATUS POST INSERTION OF ILIAC ARTERY STENT: ICD-10-CM

## 2025-03-27 DIAGNOSIS — G89.29 CHRONIC PAIN OF LEFT KNEE: ICD-10-CM

## 2025-03-27 DIAGNOSIS — I74.09 AORTOILIAC OCCLUSIVE DISEASE (HCC): ICD-10-CM

## 2025-03-27 DIAGNOSIS — Z79.4 TYPE 2 DIABETES MELLITUS WITH DIABETIC PERIPHERAL ANGIOPATHY WITHOUT GANGRENE, WITH LONG-TERM CURRENT USE OF INSULIN (HCC): ICD-10-CM

## 2025-03-27 DIAGNOSIS — M25.562 CHRONIC PAIN OF LEFT KNEE: ICD-10-CM

## 2025-03-27 DIAGNOSIS — I25.118 CORONARY ARTERY DISEASE OF NATIVE ARTERY OF NATIVE HEART WITH STABLE ANGINA PECTORIS: ICD-10-CM

## 2025-03-27 DIAGNOSIS — N40.0 BENIGN PROSTATIC HYPERPLASIA WITHOUT LOWER URINARY TRACT SYMPTOMS: ICD-10-CM

## 2025-03-27 DIAGNOSIS — E78.2 HYPERLIPIDEMIA, MIXED: ICD-10-CM

## 2025-03-27 DIAGNOSIS — K76.0 FATTY LIVER: ICD-10-CM

## 2025-03-27 PROBLEM — R63.5 WEIGHT GAIN: Status: RESOLVED | Noted: 2023-03-21 | Resolved: 2025-03-27

## 2025-03-27 PROBLEM — M25.422 ELBOW SWELLING, LEFT: Status: RESOLVED | Noted: 2024-10-31 | Resolved: 2025-03-27

## 2025-03-27 PROBLEM — M25.522 LEFT ELBOW PAIN: Status: RESOLVED | Noted: 2024-10-31 | Resolved: 2025-03-27

## 2025-03-27 PROCEDURE — 99214 OFFICE O/P EST MOD 30 MIN: CPT | Performed by: FAMILY MEDICINE

## 2025-03-27 PROCEDURE — G2211 COMPLEX E/M VISIT ADD ON: HCPCS | Performed by: FAMILY MEDICINE

## 2025-03-27 PROCEDURE — 96160 PT-FOCUSED HLTH RISK ASSMT: CPT | Performed by: FAMILY MEDICINE

## 2025-03-27 PROCEDURE — 99499 UNLISTED E&M SERVICE: CPT | Performed by: FAMILY MEDICINE

## 2025-03-27 PROCEDURE — G0439 PPPS, SUBSEQ VISIT: HCPCS | Performed by: FAMILY MEDICINE

## 2025-03-27 RX ORDER — LISINOPRIL 10 MG/1
10 TABLET ORAL DAILY
Qty: 90 TABLET | Refills: 1 | Status: SHIPPED | OUTPATIENT
Start: 2025-03-27

## 2025-03-27 RX ORDER — SEMAGLUTIDE 2.68 MG/ML
2 INJECTION, SOLUTION SUBCUTANEOUS WEEKLY
Qty: 9 EACH | Refills: 1 | Status: SHIPPED | OUTPATIENT
Start: 2025-03-27 | End: 2025-03-27

## 2025-03-27 RX ORDER — ROSUVASTATIN CALCIUM 40 MG/1
40 TABLET, COATED ORAL NIGHTLY
Qty: 90 TABLET | Refills: 1 | Status: SHIPPED | OUTPATIENT
Start: 2025-03-27

## 2025-03-27 RX ORDER — EZETIMIBE 10 MG/1
10 TABLET ORAL NIGHTLY
Qty: 90 TABLET | Refills: 1 | Status: SHIPPED | OUTPATIENT
Start: 2025-03-27

## 2025-03-27 RX ORDER — TIRZEPATIDE 10 MG/.5ML
10 INJECTION, SOLUTION SUBCUTANEOUS WEEKLY
Qty: 6 ML | Refills: 1 | Status: SHIPPED | OUTPATIENT
Start: 2025-03-27

## 2025-03-27 RX ORDER — AMLODIPINE BESYLATE 5 MG/1
5 TABLET ORAL DAILY
Qty: 90 TABLET | Refills: 1 | Status: SHIPPED | OUTPATIENT
Start: 2025-03-27

## 2025-03-27 NOTE — PROGRESS NOTES
Subjective:   Jatinder Ennis is a 74 year old male who presents for a MA AHA (Medicare Advantage Annual Health Assessment), Subsequent Annual Wellness visit (Pt already had Initial Annual Wellness), and addressed chronic conditions and  discuss labs from 3/25/2025      Type II by diabetes/CAD-. Fasting blood sugars-100's occasionally checks.  Does not check postprandia  Medications: on Ozempic 2mg and feels hungry.   On Ozempic to help with weight loss and has heart disease.  Tolerates well ozempic 2mg weekly and denies medication side effects. PA program for ozempic  .  States will pay for medication interested in changing to Mounjaro to help with weight loss and appetite.  Weight: Gained 6 pounds since last visit  Exercise: Tries to walk at least 30 minutes daily but sometimes less during colder weather  Last A1c: 5.9 on 3/25/2025  HEMOGLOBIN A1C   Date Value   2023 5.6 %   2022 5.6   02/15/2018 5.9 % (A)   2013 6.1 % (A)     HEMOGLOBIN A1c (% of total Hgb)   Date Value   2025 5.9 (H)   2023 5.6   2021 5.9 (H)     HgbA1C (%)   Date Value   2024 5.9 (H)   03/15/2024 6.3 (H)   2022 5.8 (H)   Last diabetic eye exam 2024-Dr. Oppenheim no DBR-needs to schedule appointment  Last urine microalbumin 3/26/2025 normal  Denies any burning or numbness in his feet.  Checks feet daily  Denies any issues with depression.  Feels very hopeful.     Patient presents for recheck of hypertension. Pt has been taking medications as instructed, no medication side effects, no home BP monitoring.  Denies headache, dizziness, new chest pain, can have mild SIGALA with activity that comes and goes and is attributed to a partial blockage in his heart.  This is actually gotten better and not worsening.  BP Readings from Last 5 Encounters:   25 130/70   25 125/64   10/31/24 136/70   10/15/24 139/81   09/24/24 128/76      Pt presents for recheck of hyperlipidemia. Patient reports  taking medications as instructed, no medication side effects noted. Denies any generalized muscle aches.     History of CAD with chronic stable angina/bilateral atherosclerosis of both legs-has unstented area of heart can cause some shortness of breath with activity and angina-has nitroglycerin.  Has not needed. Hx of CABG 2003  Last felt pain chest pain  1 week ago and did not need sublingual nitro. Notices if lies on the left side at night-occasionally has chest pain and if he rolls over onto his back or right side the pain will resolve-states discussed with cardiologist and not concerned. Nonradiating.  LOV with  9/18/2024-has disseminated atherosclerosis native and bypass vessels CAD.  He has had failed attempts to protect the left main with  intervention.  Disease is best treated medically.  Continue with medical therapy and risk factor modification to aggressive secondary prevention targets.  Encouraged to continue walking and controlling diabetes.  Denies any worsening chest pain/angina, worsening dyspnea on exertion, leg swelling, dizziness, near syncope.  Recommended patient follow-up with cardiology in 6 months and states has upcoming appointment.     Bilateral atherosclerosis of both legs/aortic occlusive disease-has 50 to 74% stenosis in almost all arteries of the lower extremities except right leg three-vessel runoff to the foot with greater than 75% stenosis of the proximal posterior tibial artery.  Also left popliteal and left superficial femoral artery at the abductor canal are both greater than 75%. Some cramping in his calves walks if walks up to 2 miles a day.  Pain stops with sitting.     GERD/history of Martinez's esophagus-GI is Dr. Stokes-last EGD 4/2023 with recall q3 years, colonoscopy 1/14/2022 with recall in 3 years.  Denying GERD or acid reflux symptoms.  Taking omeprazole 40 mg.  Denies abdominal pain, melena, dysphagia, unexplained weight loss.     AAA-last ultrasound was  10/2/2023 infrarenal abdominal aneurysm measuring 3.0 x 3.0 cm.  Patent bilateral common iliac artery stents with no evidence of significant stent reanastomosis.  Compared to prior study 8/30/2022 the abdominal aorta size has not changed.  Denies any abdominal pain.  AAA is managed by Dr. Stewart-cardiologist.  Denies abdominal pain.     BPH-managed by Dr. Eduardo-nocturia 1x nightly.  Denies change in stream, hematuria grossly, urinary hesitancy, frequency or urgency.  Only taking tamsulosin.  has upcoming appointment with Dr. Eduardo.  Denying gross hematuria but states had microscopic hematuria at urologist office and wants to perform outpatient cystoscopy.  Denies any dysuria.     Obstructive sleep apnea on CPAP-had appointment with pulmonolary NP-Surekha Joshi on 12/16/2024 states controlled-patient is very compliant states \"sleeps better with CPAP\".     History of claudication and hip pain-  Denies claudication.      History of arthritis in all joints. Uses tylenol arthritis.  Does not want to take NSAIDs.    Health Maintenance   Topic Date Due    Annual Well Visit  01/01/2025    Diabetes Care: Foot Exam (Annual)  01/01/2025    COVID-19 Vaccine (8 - 2024-25 season) 05/19/2025    Diabetes Care Dilated Eye Exam  09/09/2025    PSA  09/20/2025    Diabetes Care A1C  09/25/2025    Diabetes Care: GFR  03/25/2026    Colorectal Cancer Screening  01/17/2028    Influenza Vaccine  Completed    Annual Depression Screening  Completed    Fall Risk Screening (Annual)  Completed    Diabetes Care: Microalb/Creat Ratio (Annual)  Completed    Pneumococcal Vaccine: 50+ Years  Completed    Zoster Vaccines  Completed    Meningococcal B Vaccine  Aged Out       History/Other:   Fall Risk Assessment:   He has been screened for Falls and is High Risk. Fall Prevention information provided to patient in After Visit Summary.    Do you feel unsteady when standing or walking?: No  Do you worry about falling?: No  Have you fallen in the past year?:  Yes  How many times have you fallen?: 1  Were you injured?: Yes (elbow)  fractured elbow- tripped-accident. Gait is steady. No hearing or eye concerns affecting balance. No leg weakness    Cognitive Assessment:   He had a completely normal cognitive assessment - see flowsheet entries   /  /Functional Ability/Status:   Jatinder Ennis has some abnormal functions as listed below:  He has Hearing problems based on screening of functional status.  Wears hearing aids    Depression Screening (PHQ):  PHQ-2 SCORE: 0  , done 3/27/2025   If you checked off any problems, how difficult have these problems made it for you to do your work, take care of things at home, or get along with other people?: Not difficult at all       Advanced Directives:   He does NOT have a Living Will. [Do you have a living will?: No]  He does NOT have a Power of  for Health Care. [Do you have a healthcare power of ?: No]  Discussed Advance Care Planning with patient (and family/surrogate if present). Standard forms made available to patient in After Visit Summary.      Patient Active Problem List   Diagnosis    Erectile dysfunction    Coronary artery disease of native artery of native heart with stable angina pectoris    BPH (benign prostatic hyperplasia)    S/P CABG (coronary artery bypass graft)    Bilateral atherosclerosis of legs    Carotid artery disease    Diverticulosis    Fatty liver    AAA (abdominal aortic aneurysm)    Osteoarthritis of spine with radiculopathy, cervical region    Foraminal stenosis of cervical region    Facet arthritis of cervical region    DM (diabetes mellitus) type II, controlled, with peripheral vascular disorder (HCC)    Sensorineural hearing loss    KRISTEN on CPAP    Arthritis of lumbar spine    Hyperlipidemia, mixed    Tubular adenoma of colon    Martinez's esophagus determined by endoscopy    Lumbar degenerative disc disease    PAD (peripheral artery disease)    Essential hypertension    Status post  insertion of iliac artery stent    Aortoiliac occlusive disease (HCC)    BMI 33.0-33.9,adult    Chronic pain of left knee    Asymptomatic microscopic hematuria    Primary osteoarthritis of left knee    History of fall    Type 2 diabetes mellitus with diabetic peripheral angiopathy without gangrene (HCC)     Allergies:  He is allergic to other and adhesive tape.    Current Medications:  Outpatient Medications Marked as Taking for the 3/27/25 encounter (Office Visit) with Caitlin Samayoa DO   Medication Sig    amLODIPine 5 MG Oral Tab Take 1 tablet (5 mg total) by mouth daily.    ezetimibe 10 MG Oral Tab Take 1 tablet (10 mg total) by mouth nightly.    lisinopril 10 MG Oral Tab Take 1 tablet (10 mg total) by mouth daily.    rosuvastatin 40 MG Oral Tab Take 1 tablet (40 mg total) by mouth nightly.    Tirzepatide (MOUNJARO) 10 MG/0.5ML Subcutaneous Solution Auto-injector Inject 10 mg into the skin once a week.    Blood Glucose Monitoring Suppl (TRUE METRIX AIR GLUCOSE METER) Does not apply Device 1 each  in the morning and 1 each before bedtime.    Glucose Blood (TRUE METRIX BLOOD GLUCOSE TEST) In Vitro Strip Check blood sugar daily either FBS or 2 hours after eating    [DISCONTINUED] pantoprazole 40 MG Oral Tab EC TAKE 1 TABLET EVERY DAY    PEG 3350-KCl-Na Bicarb-NaCl 420 g Oral Recon Soln Take as directed by physician    metoprolol succinate ER 50 MG Oral Tablet 24 Hr Take 1 tablet (50 mg total) by mouth daily.    tamsulosin 0.4 MG Oral Cap Take 2 capsules (0.8 mg total) by mouth daily.    nitroglycerin 0.4 MG Sublingual SL Tab TAKE 1 TABLET SUBLINGUAL ONCE A DAY AS NEEDED FOR CHEST PAIN    Acetaminophen ER (TYLENOL 8 HOUR ARTHRITIS PAIN) 650 MG Oral Tab CR Take 2 tablets (1,300 mg total) by mouth every 8 (eight) hours as needed for Pain (Max is 6 tablets in 24 hours or 4000 mg of Tylenol/acetaminophen in 24 hours).    diclofenac 1 % External Gel Apply 2 g topically 4 (four) times daily. Buy otc    sildenafil 20 MG  Oral Tab TAKE 5 TABLETS BY MOUTH 1 HOUR BEFORE SEXUAL ACTIVITY. DO NOT EXCEED 5 TABLETS PER 24 HOURS    Vitamin D3, Cholecalciferol, 50 MCG (2000 UT) Oral Tab vitamin D3  2000 IUs take 1 tablet daily    Clopidogrel Bisulfate 75 MG Oral Tab Take 1 tablet (75 mg total) by mouth daily.    aspirin 81 MG Oral Tab EC Take 1 tablet (81 mg total) by mouth daily.       Medical History:  He  has a past medical history of AAA (abdominal aortic aneurysm) (12/30/2014), Arthritis, Atherosclerosis of coronary artery, Back pain, Martinez's esophagus determined by endoscopy (02/03/2020), Bilateral hip pain (09/25/2019), Bleeding nose, BPH (benign prostatic hyperplasia), Calculus of kidney, Carotid stenosis (07/14/2014), Cataract, Cataract, left, Cervical disc herniation (05/23/2016), Change in hair (1979), Chest pain on exertion (01/01/2018), Congestive heart disease (HCC), Coronary atherosclerosis, COVID-19 virus detected (01/16/2021), Diastasis recti (12/30/2014), Disorder of prostate, Diverticulosis (12/30/2014), Easy bruising, Erythrocytosis (02/09/2017), Frequent urination, Hearing impairment, Hearing loss, Heart attack (HCC) (2003), Heel spur, left (12/14/2022), Hemorrhoids, High blood pressure, High cholesterol, History of kidney stones (12/30/2014), Hyperlipidemia, Obesity (BMI 30-39.9) (05/23/2016), Obstructive sleep apnea (adult) (pediatric) (SPLIT NIGHT 5-4-17), KRISTEN on CPAP (06/14/2017), Osteoarthrosis, unspecified whether generalized or localized, unspecified site, Pain in joints, Peripheral vascular disease, Personal history of other diseases of circulatory system, Plantar fasciitis (12/14/2022), Polycythemia (02/09/2017), PVD (peripheral vascular disease) (07/14/2014), S/P CABG (coronary artery bypass graft) (05/16/2013), Shortness of breath, Sleep apnea, Stented coronary artery, Trochanteric bursitis of right hip (03/19/2016), and Type II or unspecified type diabetes mellitus without mention of complication, not stated  as uncontrolled.  Surgical History:  He  has a past surgical history that includes colonoscopy (2003); cystoscopy,insert ureteral stent (12/2014); colonoscopy (N/A, 01/30/2015); other surgical history (2014); tonsillectomy; cholecystectomy (2003); cataract (2018); cabg (2003); angiogram; colonoscopy (N/A, 01/17/2020); cataract; colonoscopy (N/A, 03/03/2021); colonoscopy (N/A, 01/14/2022); angioplasty (coronary); cath bare metal stent (bms); vasectomy; and colonoscopy (N/A, 1/17/2025).   Family History:  His family history includes Arthritis in his father and mother; CAD in his father; Cancer in his father; Diabetes in his mother; Heart Attack in his father; Heart Disorder in his father; diabetes mellitus in his mother; lung cancer in his father.  Social History:  He  reports that he quit smoking about 22 years ago. His smoking use included cigarettes. He started smoking about 58 years ago. He has a 36.3 pack-year smoking history. He has never been exposed to tobacco smoke. He has never used smokeless tobacco. He reports current alcohol use of about 4.0 standard drinks of alcohol per week. He reports that he does not use drugs.    Tobacco:  He smoked tobacco in the past but quit greater than 12 months ago.  Tobacco Use[1]       CAGE Alcohol Screen:   CAGE screening score of 0 on 3/27/2025, showing low risk of alcohol abuse.      Patient Care Team:  Caitlin Samayoa DO as PCP - General (Family Medicine)  Matt Stewart MD (Cardiovascular Diseases)  Oppenheim, Robert A, MD (OPHTHALMOLOGY)  Alberto Stokes DO (GASTROENTEROLOGY)  Camacho Dowling IV, MD (PULMONARY DISEASES)  Miguel Eduardo MD (UROLOGY)    Review of Systems  GENERAL: feels well otherwise  SKIN: denies any unusual skin lesions  EYES: denies blurred vision or double vision  HEENT: denies nasal congestion, sinus pain or ST  LUNGS: denies shortness of breath with exertion  CARDIOVASCULAR: denies chest pain on exertion  GI: denies abdominal pain, denies  heartburn  : 1 per night nocturia, no complaint of urinary incontinence  MUSCULOSKELETAL: denies back pain  NEURO: denies headaches  PSYCHE: denies depression or anxiety  HEMATOLOGIC: denies hx of anemia  ENDOCRINE: denies thyroid history  ALL/ASTHMA: denies hx of allergy or asthma    Objective:   Physical Exam  General Appearance:  Alert, cooperative, no distress, appears stated age   Head:  Normocephalic, without obvious abnormality, atraumatic   Eyes:  PERRL, conjunctiva/corneas clear, EOM's intact, both eyes   Ears:  Normal TM's and external ear canals, both ears   Nose: Nares normal, septum midline, mucosa normal, no drainage or sinus tenderness   Throat: Lips, mucosa, and tongue normal; teeth and gums normal   Neck: Supple, symmetrical, trachea midline, no adenopathy, thyroid: not enlarged, symmetric, no tenderness/mass/nodules, no carotid bruit or JVD   Back:   Symmetric, no curvature, ROM normal, no CVA tenderness   Lungs:   Clear to auscultation bilaterally, respirations unlabored   Chest Wall:  No tenderness or deformity   Heart:  Regular rate and rhythm, S1, S2 normal, no murmur, rub or gallop   Abdomen:   Soft, non-tender, bowel sounds active all four quadrants,  no masses, no organomegaly   Genitalia: Normal male   Rectal: Normal tone, normal prostate, no masses or tenderness   Extremities: Extremities normal, atraumatic, no cyanosis or edema  Bilateral barefoot skin diabetic exam is normal, visualized feet and the appearance is normal.  Bilateral monofilament/sensation of both feet is normal.  Pulsation pedal pulse exam of both lower legs/feet is normal as well.     Pulses: 2+ and symmetric   Skin: Skin color, texture, turgor normal, no rashes or lesions   Lymph nodes: Cervical, supraclavicular, and axillary nodes normal   Neurologic: Normal     /70   Pulse 70   Temp 97.2 °F (36.2 °C) (Temporal)   Resp 22   Ht 6' 1.9\" (1.877 m)   Wt 263 lb 3.2 oz (119.4 kg)   SpO2 95%   BMI 33.88 kg/m²   Estimated body mass index is 33.88 kg/m² as calculated from the following:    Height as of this encounter: 6' 1.9\" (1.877 m).    Weight as of this encounter: 263 lb 3.2 oz (119.4 kg).    Medicare Hearing Assessment:   Hearing Screening    Screening Method: Finger Rub  Finger Rub Result: Pass (Comment: wearing hearing aids)         Visual Acuity:   Right Eye Visual Acuity: Uncorrected Right Eye Chart Acuity: 20/30   Left Eye Visual Acuity: Uncorrected Left Eye Chart Acuity: 20/50   Both Eyes Visual Acuity: Uncorrected Both Eyes Chart Acuity: 20/30   Able To Tolerate Visual Acuity: Yes          Component      Latest Ref Rng 3/25/2025   WBC      3.8 - 10.8 Thousand/uL 7.5    RBC      4.20 - 5.80 Million/uL 5.31    Hemoglobin      13.2 - 17.1 g/dL 16.3    Hematocrit      38.5 - 50.0 % 48.8    MCV      80.0 - 100.0 fL 91.9    MCH      27.0 - 33.0 pg 30.7    MCHC      32.0 - 36.0 g/dL 33.4    RDW      11.0 - 15.0 % 12.7    Platelet Count      140 - 400 Thousand/uL 249    MPV      7.5 - 12.5 fL 9.5    Neutrophils Absolute      1,500 - 7,800 cells/uL 4,050    Lymphocytes Absolute      850 - 3,900 cells/uL 2,498    Monocytes Absolute      200 - 950 cells/uL 675    Eosinophils Absolute      15 - 500 cells/uL 225    Basophils Absolute      0 - 200 cells/uL 53    Neutrophils %      % 54    Lymphocytes %      % 33.3    Monocytes %      % 9.0    Eosinophils %      % 3.0    Basophils %      % 0.7    Glucose      65 - 99 mg/dL 103 (H)    BUN      7 - 25 mg/dL 17    CREATININE      0.70 - 1.28 mg/dL 0.96    EGFR      > OR = 60 mL/min/1.73m2 83    BUN/CREATININE RATIO      6 - 22 (calc) SEE NOTE:    Sodium      135 - 146 mmol/L 139    Potassium      3.5 - 5.3 mmol/L 4.8    Chloride      98 - 110 mmol/L 105    Carbon Dioxide, Total      20 - 32 mmol/L 26    CALCIUM      8.6 - 10.3 mg/dL 9.8    PROTEIN, TOTAL      6.1 - 8.1 g/dL 6.7    Albumin      3.6 - 5.1 g/dL 4.3    Globulin      1.9 - 3.7 g/dL (calc) 2.4    A/G Ratio      1.0 -  2.5 (calc) 1.8    Total Bilirubin      0.2 - 1.2 mg/dL 0.6    Alkaline Phosphatase      35 - 144 U/L 54    AST (SGOT)      10 - 35 U/L 21    ALT (SGPT)      9 - 46 U/L 29    Cholesterol, Total      <200 mg/dL 133    HDL Cholesterol      > OR = 40 mg/dL 47    Triglycerides      <150 mg/dL 125    LDL Cholesterol Calc      mg/dL (calc) 65    Chol/HDL Ratio      <5.0 (calc) 2.8    NON-HDL CHOLESTEROL      <130 mg/dL (calc) 86    CREATININE, RANDOM URINE      20 - 320 mg/dL 141    MICROALBUMIN      See Note: mg/dL 0.5    MICROALBUMIN/CREATININE RATIO, RANDOM URINE      <30 mg/g creat 4    HEMOGLOBIN A1c      <5.7 % of total Hgb 5.9 (H)    TSH      0.40 - 4.50 mIU/L 1.51       Legend:  (H) High  Assessment & Plan:   Jatinder Ennis is a 74 year old male who presents for a Medicare Assessment.     1. Encounter for annual health examination (Primary)  2. Type 2 diabetes mellitus with diabetic peripheral angiopathy without gangrene, with long-term current use of insulin (HCC)  Overview:  Confirmed  Orders:  -     Detailed, Mod Complex (55852)  -    change to Rx Mounjaro; Inject 10 mg into the skin once a week.  Dispense: 6 mL; Refill: 1  -     Lipid Panel  -     Comp Metabolic Panel (14)  -     Hemoglobin A1C  -     Ophthalmology Referral - In Network  Controlled  A1c <7.0   Discontinue Ozempic due to increased appetite  Start Mounjaro to help with weight loss and appetite reduction  Risk and benefits of  Moujaro/tirzepatide discussed-, nausea, vomiting, epigastric pain.  Risk of pancreatitis, medullary thyroid carcinoma/thyroid tumor, discussed if neck mass, dysphasia, dyspnea, persistent hoarseness, stop medication and call.  Any severe symptoms with side effects such as epigastric pain, vomiting, angioedema, increased heart rate, call office ASAP  - Encouraged once a day Accu-Cheks  - Encourage low-carb diet  -Urine microalbumin is normal  - Needs annual eye exam due 9/2025  - Recommend following up in 3 months to titrate  Mounjaro instead of normal 6 months.  - Repeat labs and OV in 3 months    3. Essential hypertension  -     Detailed, Mod Complex (05740)  -     amLODIPine Besylate; Take 1 tablet (5 mg total) by mouth daily.  Dispense: 90 tablet; Refill: 1  -     Lisinopril; Take 1 tablet (10 mg total) by mouth daily.  Dispense: 90 tablet; Refill: 1  -     Lipid Panel  -     Comp Metabolic Panel (14)  Controlled  Continue amlodipine, lisinopril, metoprolol  Encouraged weight loss, walking 30 minutes daily, Mediterranean diet  Follow-up office visit in 3 months and repeat labs    4. Hyperlipidemia, mixed  -     Detailed, Mod Complex (87982)  -     Ezetimibe; Take 1 tablet (10 mg total) by mouth nightly.  Dispense: 90 tablet; Refill: 1  -     Rosuvastatin Calcium; Take 1 tablet (40 mg total) by mouth nightly.  Dispense: 90 tablet; Refill: 1  -     Lipid Panel  -     Comp Metabolic Panel (14)  Controlled  LDL less than 70  Continue rosuvastatin and ezetimibe  Encouraged weight loss, walking 30 minutes daily, Mediterranean diet  Follow-up in OV in 3 months with repeat labs    5. Coronary artery disease of native artery of native heart with stable angina pectoris  6. S/P CABG (coronary artery bypass graft)  Overview:  MI cabg 2003  Orders:  -     Detailed, Mod Complex ([id number])  -     Ezetimibe; Take 1 tablet (10 mg total) by mouth nightly.  Dispense: 90 tablet; Refill: 1  -     Rosuvastatin Calcium; Take 1 tablet (40 mg total) by mouth nightly.  Dispense: 90 tablet; Refill: 1  -     Mounjaro; Inject 10 mg into the skin once a week.  Dispense: 6 mL; Refill: 1  -     Lipid Panel  -     Cardio Referral - Internal  -     Cardio Referral - Internal  Asymptomatic  Continue rosuvastatin, ezetimibe, metoprolol, ASA, Plavix nitroglycerin as needed    7. Aortoiliac occlusive disease (HCC)  8. PAD (peripheral artery disease)  11. Status post insertion of iliac artery stent  -     Ezetimibe; Take 1 tablet (10 mg total) by mouth nightly.  Dispense: 90  tablet; Refill: 1  -     Rosuvastatin Calcium; Take 1 tablet (40 mg total) by mouth nightly.  Dispense: 90 tablet; Refill: 1  -     Cardio Referral - Internal  -     Cardio Referral - Internal  Continue rosuvastatin, ezetimibe, ASA, Plavix nitroglycerin as needed  Encouraged walking 30 minutes daily  Encouraged weight loss, Mediterranean diet  Keep follow-up appointment with cardiologist, Dr. Stewart    9. Bilateral carotid artery disease, unspecified type  Overview:  Mild 2014  Plan repeat us carotid chronic stable issue, continue present management with observation and medications as noted    Orders:  -     Rosuvastatin Calcium; Take 1 tablet (40 mg total) by mouth nightly.  Dispense: 90 tablet; Refill: 1  -     Cardio Referral - Internal  -     Cardio Referral - Internal  Continue rosuvastatin, ezetimibe, metoprolol, ASA, Plavix nitroglycerin as needed  Continue controlling diabetes, cholesterol and hypertension  Keep follow-up appointment with Dr. Stewart cardiology    10. Infrarenal abdominal aortic aneurysm (AAA) without rupture  Overview:  2017  Orders:  -     Detailed, Mod Complex ([id number])  -     Lipid Panel  -     Cardio Referral - Internal  Continue rosuvastatin, ezetimibe, metoprolol, ASA,  He is symptomatic  Keep follow-up appointment with Dr. Stewart cardiology    13. Fatty liver  -     Detailed, Mod Complex ([id number])  -     Comp Metabolic Panel (14)  Needs to complete hepatitis A and B vaccine  Encouraged weight loss    14. Martinez's esophagus determined by endoscopy  Overview:  [id number], biopsies positive for Martinez's -aspirin decreased to 81 mg from 325 mg at last endoscopy in 2020.  Repeat endoscopy in 3 years-scheduled EGD 4/19/2023  Orders:  -     Detailed, Mod Complex ([id number])  Continue omeprazole 40 mg  Due for repeat EGD q3 years 4/2026    15. KRISTEN on CPAP  -     Detailed, Mod Complex ([id number])  -     Mounjaro; Inject 10 mg into the skin once a week.  Dispense: 6 mL; Refill: 1  Continue  annual follow-up with pulmonologist and compliance with CPAP    16. Benign prostatic hyperplasia without lower urinary tract symptoms  -     Urology Referral - In Network  Continue tamsulosin  Needs annual follow-up with urology    17. Degeneration of intervertebral disc of lumbar region with discogenic back pain  18. Chronic pain of left knee  Overview:  6/2022 Complains of insidious right medial knee pain times swells up.  Started a month ago no precipitating incident.  Knee is not locking or giving out.  No ankle or current hip pain.  Has seen Dr. Lombardi for hip evaluation would like to have a referral.  Not taking anything for pain.  Able to walk up and down the stairs.  No prior history of knee arthritis  Orders:  -     Ortho Referral - In Network  Follow-up with orthopedist as needed for injections requesting referral    19. Screening for malignant neoplasm of prostate  -     PSA Total, Screen; Future; Expected date: 03/27/2025  Other orders  -     Discontinue: Ozempic (2 MG/DOSE); Inject 2 mg into the skin once a week.  Dispense: 9 each; Refill: 1      The patient indicates understanding of these issues and agrees to the plan.  Consult ordered.  Lab work ordered.  Prescription medication ordered.  Reinforced healthy diet, lifestyle, and exercise.      Return in 6 months (on 9/27/2025) for HTN,HL,DM, discuss labs, sooner if needed..     Caitlin Samayoa DO, 3/27/2025     Supplementary Documentation:   General Health:  In the past six months, have you lost more than 10 pounds without trying?: 2 - No  Has your appetite been poor?: No  Type of Diet: Balanced  How does the patient maintain a good energy level?: Stretching  How would you describe your daily physical activity?: Moderate  How would you describe your current health state?: Fair  How do you maintain positive mental well-being?: Visiting Friends, Visiting Family  On a scale of 0 to 10, with 0 being no pain and 10 being severe pain, what is your pain  level?: 0 - (None)  In the past six months, have you experienced urine leakage?: 0-No  At any time do you feel concerned for the safety/well-being of yourself and/or your children, in your home or elsewhere?: No    Health Maintenance   Topic Date Due    Annual Well Visit  2025    Diabetes Care: Foot Exam (Annual)  2025    COVID-19 Vaccine (8 - 2024-25 season) 2025    Diabetes Care Dilated Eye Exam  2025    PSA  2025    Diabetes Care A1C  2025    Diabetes Care: GFR  2026    Colorectal Cancer Screening  2028    Influenza Vaccine  Completed    Annual Depression Screening  Completed    Fall Risk Screening (Annual)  Completed    Diabetes Care: Microalb/Creat Ratio (Annual)  Completed    Pneumococcal Vaccine: 50+ Years  Completed    Zoster Vaccines  Completed    Meningococcal B Vaccine  Aged Out          [1]   Social History  Tobacco Use   Smoking Status Former    Current packs/day: 0.00    Average packs/day: 1 pack/day for 36.3 years (36.3 ttl pk-yrs)    Types: Cigarettes    Start date: 1967    Quit date: 2003    Years since quittin.0    Passive exposure: Never   Smokeless Tobacco Never

## 2025-04-16 ENCOUNTER — TELEPHONE (OUTPATIENT)
Dept: FAMILY MEDICINE CLINIC | Facility: CLINIC | Age: 75
End: 2025-04-16

## 2025-04-16 NOTE — TELEPHONE ENCOUNTER
Received approval for Mounjaro 10mg/0.5ml from [employer].    PA Case: [id number]  Effective: 04/16/25-12/31/25    Notified pt. of approval. Pt. verbalized understanding.    Approval letter sent to scan.

## 2025-06-10 ENCOUNTER — LAB ENCOUNTER (OUTPATIENT)
Dept: LAB | Age: 75
End: 2025-06-10
Attending: FAMILY MEDICINE
Payer: MEDICARE

## 2025-06-10 DIAGNOSIS — Z12.5 SCREENING FOR MALIGNANT NEOPLASM OF PROSTATE: ICD-10-CM

## 2025-06-10 LAB — COMPLEXED PSA SERPL-MCNC: 1.76 NG/ML (ref ?–4)

## 2025-06-10 PROCEDURE — 36415 COLL VENOUS BLD VENIPUNCTURE: CPT

## 2025-07-10 DIAGNOSIS — I25.118 CORONARY ARTERY DISEASE OF NATIVE ARTERY OF NATIVE HEART WITH STABLE ANGINA PECTORIS: ICD-10-CM

## 2025-07-10 DIAGNOSIS — I10 ESSENTIAL HYPERTENSION: ICD-10-CM

## 2025-07-15 RX ORDER — METOPROLOL SUCCINATE 50 MG/1
50 TABLET, EXTENDED RELEASE ORAL DAILY
Qty: 90 TABLET | Refills: 3 | Status: SHIPPED | OUTPATIENT
Start: 2025-07-15

## 2025-08-07 ENCOUNTER — MED REC SCAN ONLY (OUTPATIENT)
Dept: FAMILY MEDICINE CLINIC | Facility: CLINIC | Age: 75
End: 2025-08-07

## 2025-08-20 ENCOUNTER — HOSPITAL ENCOUNTER (OUTPATIENT)
Dept: CT IMAGING | Age: 75
Discharge: HOME | End: 2025-08-20
Attending: INTERNAL MEDICINE

## 2025-08-20 DIAGNOSIS — I71.40 AAA (ABDOMINAL AORTIC ANEURYSM): ICD-10-CM

## 2025-08-20 DIAGNOSIS — I70.203 BILATERAL ATHEROSCLEROSIS OF LEGS: ICD-10-CM

## 2025-08-20 DIAGNOSIS — I70.219 ATHEROSCLEROSIS OF ARTERY OF EXTREMITY WITH INTERMITTENT CLAUDICATION: ICD-10-CM

## 2025-08-20 DIAGNOSIS — Z95.828 STATUS POST INSERTION OF ILIAC ARTERY STENT: ICD-10-CM

## 2025-08-20 PROCEDURE — 75635 CT ANGIO ABDOMINAL ARTERIES: CPT | Performed by: INTERNAL MEDICINE

## (undated) DIAGNOSIS — I10 ESSENTIAL HYPERTENSION: ICD-10-CM

## (undated) DEVICE — TRAP 4 CPTR CHMBR N EZ INLN

## (undated) DEVICE — MEDI-VAC NON-CONDUCTIVE SUCTION TUBING: Brand: CARDINAL HEALTH

## (undated) DEVICE — CELL SAVER TUBING BRAT

## (undated) DEVICE — GEL AQUASONIC 100 20GR

## (undated) DEVICE — ENDOSCOPY PACK - LOWER: Brand: MEDLINE INDUSTRIES, INC.

## (undated) DEVICE — 10FT COMBINED O2 DELIVERY/CO2 MONITORING. FILTER WITH MICROSTREAM TYPE LUER: Brand: DUAL ADULT NASAL CANNULA

## (undated) DEVICE — FILTERLINE NASAL ADULT O2/CO2

## (undated) DEVICE — ENDOSCOPY PACK UPPER: Brand: MEDLINE INDUSTRIES, INC.

## (undated) DEVICE — Device

## (undated) DEVICE — SOL  .9 1000ML BTL

## (undated) DEVICE — STERILE POLYISOPRENE POWDER-FREE SURGICAL GLOVES: Brand: PROTEXIS

## (undated) DEVICE — FORCEP BIOPSY RJ4 LG CAP W/ND

## (undated) DEVICE — CELL SAVER RESERVOIR BRAT

## (undated) DEVICE — 1200CC GUARDIAN II: Brand: GUARDIAN

## (undated) DEVICE — KIT ENDO ORCAPOD 160/180/190

## (undated) DEVICE — 3M™ RED DOT™ MONITORING ELECTRODE WITH FOAM TAPE AND STICKY GEL 2570-3, 3/BAG, 200/CASE, 54/PLT: Brand: RED DOT™

## (undated) DEVICE — NEEDLE CONTRAST INTERJECT 25G

## (undated) DEVICE — 3M™ TEGADERM™ TRANSPARENT FILM DRESSING, 1626W, 4 IN X 4-3/4 IN (10 CM X 12 CM), 50 EACH/CARTON, 4 CARTON/CASE: Brand: 3M™ TEGADERM™

## (undated) DEVICE — CV PACK-LF: Brand: MEDLINE INDUSTRIES, INC.

## (undated) DEVICE — SNARE 9MM 230CM 2.4MM EXACTO

## (undated) DEVICE — SUTURE PROLENE 6-0 C-1

## (undated) DEVICE — Device: Brand: DEFENDO AIR/WATER/SUCTION AND BIOPSY VALVE

## (undated) DEVICE — GOWN SURG AERO CHROME XXL

## (undated) DEVICE — SUTURE VICRYL 3-0 PS-1

## (undated) DEVICE — SUTURE VICRYL 2-0 CT-1

## (undated) DEVICE — 3M™ RED DOT™ MONITORING ELECTRODE WITH FOAM TAPE AND STICKY GEL, 50/BAG, 20/CASE, 72/PLT 2570: Brand: RED DOT™

## (undated) DEVICE — MARKER SKIN 2 TIP

## (undated) DEVICE — MEDI-VAC SUCTION HANDLE REGULAR CAPACITY: Brand: CARDINAL HEALTH

## (undated) DEVICE — SKIN AFFIX .4ML

## (undated) DEVICE — ORISE GEL

## (undated) DEVICE — CHLORAPREP 26ML APPLICATOR

## (undated) DEVICE — GLOVE SURG TRIUMPH SZ 8

## (undated) DEVICE — SOL  .9 500ML

## (undated) DEVICE — LASSO POLYPECTOMY SNARE: Brand: LASSO

## (undated) DEVICE — 3M™ BAIR HUGGER® UNDERBODY BLANKET, FULL ACCESS, 10 PER CASE 63500: Brand: BAIR HUGGER™

## (undated) DEVICE — CHLORAPREP ORANGE TINT 10.5ML

## (undated) DEVICE — BIOGUARD CLEANING ADAPTER

## (undated) DEVICE — KIT VLV 5 PC AIR H2O SUCT BX ENDOGATOR CONN

## (undated) DEVICE — STANDARD HYPODERMIC NEEDLE,POLYPROPYLENE HUB: Brand: MONOJECT

## (undated) DEVICE — ENCORE® PERRY STYLE 42 PF SIZE 7.5, STERILE LATEX POWDER-FREE SURGICAL GLOVE: Brand: ENCORE

## (undated) DEVICE — 3M™ IOBAN™ 2 ANTIMICROBIAL INCISE DRAPE 6651EZ: Brand: IOBAN™ 2

## (undated) DEVICE — SUTURE POLYDEK 4-0 TIES 6-932

## (undated) DEVICE — TRANSPOSAL ULTRAFLEX DUO/QUAD ULTRA CART MANIFOLD

## (undated) DEVICE — BLOCK BITE MAXI 60FR

## (undated) DEVICE — KIT CUSTOM ENDOPROCEDURE STERIS

## (undated) DEVICE — FORCEP RADIAL JAW 4

## (undated) DEVICE — SUTURE PROLENE 5-0 C-1

## (undated) DEVICE — V2 SPECIMEN COLLECTION MANIFOLD KIT: Brand: NEPTUNE

## (undated) DEVICE — SUTURE PROLENE 7-0 CC

## (undated) NOTE — MR AVS SNAPSHOT
After Visit Summary   3/8/2017    Mahsa Corley    MRN: [id number]           Diagnoses this Visit     Abnormal RBC    -  Primary     Polycythemia           Allergies     No Known Allergies      Your Vital Signs Were     Smoking Status LAB:  ERYTHROPOIETIN (EPO), SERUM        Wednesday March 08, 2017     LAB:  FERRITIN        Wednesday March 08, 2017     LAB:  IRON AND TIBC        Wednesday April 05, 2017 11:30 AM     Appointment with Shabana Mirza at [facility], [address] [address] [place]

## (undated) NOTE — LETTER
Patient Name: Avtar Gaston  : 1950  MRN: [id number]  Patient Address: [address] [place] [address] [address] [address]      Coronavirus Disease 2019 (COVID-19)     [facility] is committed to the safety and well-being of our patients, shae carefully. If your symptoms get worse, call your healthcare provider immediately. 3. Get rest and stay hydrated.    4. If you have a medical appointment, call the healthcare provider ahead of time and tell them that you have or may have COVID-19.  5. For m of fever-reducing medications; and  · Improvement in respiratory symptoms (e.g., cough, shortness of breath); and  · At least 10 days have passed since symptoms first appeared OR if asymptomatic patient or date of symptom onset is unclear then use 10 days donors must:    · Have had a confirmed diagnosis of COVID-19  · Be symptom-free for at least 14 days*    *Some people will be required to have a repeat COVID-19 test in order to be eligible to donate.  If you’re instructed by Robbie that a repeat test is r

## (undated) NOTE — LETTER
05/28/21    Mary Marquez  [address] [address]      Dear Rubén Ybarra records indicate that you have outstanding lab work and or testing that was ordered for you and has not yet been completed:        CMP       Lipids       Hemo

## (undated) NOTE — MR AVS SNAPSHOT
After Visit Summary   3/22/2017    Bobby Beltran    MRN: [id number]           Diagnoses this Visit     Polycythemia    -  Primary       Allergies     No Known Allergies      Your Vital Signs Were     BP Pulse Temp(Src) Resp Weight SpO2    159/91 mmH negative control using DNA from placental DNA. The JAK2 gene sequences are present in the normal human genome and   serve as a control for PCR in the assay.      PCR products are analyzed by electrophoresis and UV   transillumination of ethidium bromide

## (undated) NOTE — LETTER
Janna Arias M.D., F.A.C.S. Tu Jarrett M.D., F.A.C.S. Kyle Nguyen M.D., Marion Castillo. IOANA Stephen M.D., F.A.C.S. Sharmaine Gitelman. Marj Butler M.D., F.A.C.S. PHILLIP Dash M. Sherrlyn Maple A.D. Goble Sanfilippo, M.D., F. chance to have all of your questions and concerns answered. If there are any issues which have not been adequately addressed, we ask you to bring them forward so that we can thoroughly address them.     A patient who is fully informed and understands their treatment, among other options and the risks and benefits of the different treatment options:    Yes _____ No _____    A [facility] surgeon as explained to me that if I should so desire, he/she is willing to explain my case and the surgical and non-surgical optio

## (undated) NOTE — MR AVS SNAPSHOT
[facility] [address]  [address] [facility]  [phone]               Thank you for choosing us for your health care visit with Sang Vidales MD.  We are glad to serve you and happy to provide you with t [address]   Phone:  [phone]   Fax:  [phone]         Referral Orders      Normal Orders This Visit    CARDIO - INTERNAL [[id number] CPT(R)]  Order #:  [id number]         **REFERRAL REQUEST**    Your physicia numbers can create reimbursement difficulties for you.     Assoc Dx:  Polycythemia [D75.1]          Reason for Today's Visit     Physical     Referral           Medical Issues Discussed Today     AAA (abdominal aortic aneurysm) (HCC)    Abnormal LFTs    Nolberto Ultrasound, MRI)  •Cardiac Testing in ER building Building A second floor Cardiac Testing [phone] (For example: Holter Monitor, Cardiac Stress tests,Event Monitor, or 2D Echocardiograms)  •[facility] call [phone] usually in dg A  • We cannot refill your maintenance medications at a preventative wellness visit. To best provide you care, patients receiving maintenance medications need to be seen at least twice a year. .         Allergies as of Apr 05, 2017     No Known Allergies You can access your MyChart to more actively manage your health care and view more details from this visit by going to [email or web address].   If you've recently had a stay at the Hospital you can access your discharge instructions in [address] by sofi

## (undated) NOTE — LETTER
[address]  [address] [facility] [address], [address] [facility]  Authorization for Surgical Operation and Procedure     Date:___________                                                                                                         Time:__________ and/or blood products. The following are some, but not all, of the potential risks that can occur: fever and allergic reactions, hemolytic reactions, transmission of diseases such as Hepatitis, AIDS and Cytomegalovirus (CMV) and fluid overload.   In the ev (or a person authorized to consent on my behalf). The surgeon or my attending physician will determine when the applicable recovery period ends for purposes of reinstating the DNAR order.   10. Patients having a sterilization procedure: I understand that if 2. As the patient asking for anesthesia services, I agree to:  a. Allow the anesthesiologist (anesthesia doctor) to give me medicine and do additional procedures as necessary.  Some examples are: Starting or using an “IV” to give me medicine, fluids or bloo Very rare risks include infection, bleeding, seizure, irregular heart rhythms and nerve injury. 7. Regional Anesthesia (“spinal”, “epidural”, & “nerve blocks”):   I understand that rare but potential complications include headache, bleeding, infection, sei

## (undated) NOTE — LETTER
11/04/20        Maryann Mi  [address] [address]      Dear Ralph Bosworth records indicate that you have outstanding lab work and or testing that was ordered for you and has not yet been completed:      CMP       Lipids

## (undated) NOTE — Clinical Note
[facility], [address] [facility] , [address] [place] [id number]  [phone]            April 27, 2017      Nanci Francois [id number]      Dear Mr. Celeste Acevedo,     This is a letter to r

## (undated) NOTE — Clinical Note
MACHELLEI, TCM call made, see notes. NC attempted to schedule a TCM HFU patient states that he has an appointment on 9/15/2020 with his PCP. NCM explained that it is recommended that he have a TCM HFU within 2 weeks of discharge.  Patient states he will wait to

## (undated) NOTE — LETTER
Lux Lopez M.D., F.A.C.S. Afshin Al M.D., F.A.C.S. Ismael Lincoln M.D., Kamran Grullon M.D., F.A.C.S. Jennifer Romero. Theodore Toth M.D., F.A.C.S. Scott Ba M.D. DANILO Flores M.D., F. chance to have all of your questions and concerns answered. If there are any issues which have not been adequately addressed, we ask you to bring them forward so that we can thoroughly address them.     A patient who is fully informed and understands their treatment, among other options and the risks and benefits of the different treatment options:    Yes _____ No _____    A [facility] surgeon as explained to me that if I should so desire, he/she is willing to explain my case and the surgical and non-surgical optio

## (undated) NOTE — ED AVS SNAPSHOT
Bud Zaragoza   MRN: [id number]    Department:  [facility] Emergency Department   Date of Visit:  8/13/2018           Disclosure     Insurance plans vary and the physician(s) referred by the ER may not be covered by your plan.  Please contact your tell this physician (or your personal doctor if your instructions are to return to your personal doctor) about any new or lasting problems. The primary care or specialist physician will see patients referred from the [facility] Emergency Department.  Zi Potter

## (undated) NOTE — LETTER
[facility] [address], [address]  Consent for Procedure/Sedation    Date:        Time:       1.  I authorize the performance upon Julio Cesar Espinozas the following:cardiac catheterization, left ventricular cineangiography, bilateral javier period, the physician will determine when the applicable recovery period ends for purposes of reinstating the Do Not Resuscitate (DNR) order.     Signature of Patient: ____________________________________________________    Signature of person authorized

## (undated) NOTE — LETTER
[facility] [address], [address]  Consent for Procedure/Sedation    Date:     Time:       1.  I authorize the performance upon Bobby Beltran the following:cardiac catheterization, left ventricular cineangiography, bilateral select period, the physician will determine when the applicable recovery period ends for purposes of reinstating the Do Not Resuscitate (DNR) order.     Signature of Patient: ____________________________________________________    Signature of person authorized

## (undated) NOTE — MR AVS SNAPSHOT
After Visit Summary   5/18/2017    Josh Ashton    MRN: [id number]           Diagnoses this Visit     Erythrocytosis    -  Primary       Allergies     No Known Allergies      Your Vital Signs Were     BP Pulse Temp(Src) Resp Weight SpO2    121/55 m Neutrophil Absolute Prelim 4.51  1.30-6.70  x10 (3) uL Final    Neutrophil Absolute 4.51  1.30-6.70  x10(3) uL Final    Lymphocyte Absolute 2.56  0.90-4.00  x10(3) uL Final    Monocyte Absolute 0.72 (H) 0.10-0.60  x10(3) uL Final    Eosinophil Absolute 0.